# Patient Record
Sex: FEMALE | Race: WHITE | Employment: FULL TIME | URBAN - METROPOLITAN AREA
[De-identification: names, ages, dates, MRNs, and addresses within clinical notes are randomized per-mention and may not be internally consistent; named-entity substitution may affect disease eponyms.]

---

## 2017-01-06 RX ORDER — BUTALBITAL, ACETAMINOPHEN AND CAFFEINE 50; 325; 40 MG/1; MG/1; MG/1
TABLET ORAL
Qty: 28 TABLET | Refills: 1 | Status: CANCELLED | OUTPATIENT
Start: 2017-01-06

## 2017-01-06 RX ORDER — SUMATRIPTAN 50 MG/1
50 TABLET, FILM COATED ORAL EVERY 2 HOUR PRN
Qty: 9 TABLET | Refills: 1 | Status: SHIPPED | OUTPATIENT
Start: 2017-01-06 | End: 2018-01-31

## 2017-01-06 RX ORDER — VALACYCLOVIR HYDROCHLORIDE 1 G/1
TABLET, FILM COATED ORAL EVERY 12 HOURS SCHEDULED
Qty: 21 TABLET | Refills: 0 | Status: CANCELLED | OUTPATIENT
Start: 2017-01-06 | End: 2017-01-13

## 2017-03-02 ENCOUNTER — OFFICE VISIT (OUTPATIENT)
Dept: OCCUPATIONAL MEDICINE | Age: 47
End: 2017-03-02
Attending: PHYSICIAN ASSISTANT

## 2017-03-02 DIAGNOSIS — S61.012A LACERATION OF LEFT THUMB WITHOUT COMPLICATION, INITIAL ENCOUNTER: Primary | ICD-10-CM

## 2017-03-04 ENCOUNTER — OFFICE VISIT (OUTPATIENT)
Dept: OCCUPATIONAL MEDICINE | Age: 47
End: 2017-03-04
Attending: PHYSICIAN ASSISTANT

## 2017-03-04 DIAGNOSIS — S61.412D: Primary | ICD-10-CM

## 2017-03-10 ENCOUNTER — OFFICE VISIT (OUTPATIENT)
Dept: OCCUPATIONAL MEDICINE | Age: 47
End: 2017-03-10
Attending: PHYSICIAN ASSISTANT

## 2017-03-10 DIAGNOSIS — S61.412D: Primary | ICD-10-CM

## 2017-09-14 ENCOUNTER — OFFICE VISIT (OUTPATIENT)
Dept: OBGYN CLINIC | Facility: CLINIC | Age: 47
End: 2017-09-14

## 2017-09-14 VITALS
HEART RATE: 102 BPM | DIASTOLIC BLOOD PRESSURE: 86 MMHG | WEIGHT: 180 LBS | SYSTOLIC BLOOD PRESSURE: 124 MMHG | HEIGHT: 67 IN | BODY MASS INDEX: 28.25 KG/M2

## 2017-09-14 DIAGNOSIS — N95.1 PERIMENOPAUSE: Primary | ICD-10-CM

## 2017-09-14 DIAGNOSIS — R23.2 HOT FLASHES: ICD-10-CM

## 2017-09-14 DIAGNOSIS — N92.6 IRREGULAR MENSES: ICD-10-CM

## 2017-09-14 PROCEDURE — 99203 OFFICE O/P NEW LOW 30 MIN: CPT | Performed by: OBSTETRICS & GYNECOLOGY

## 2017-09-14 NOTE — PROGRESS NOTES
GYN H&P     2017  6:55 PM    CC: Patient presents with: Other: pt states she had went 7 months without a period and last week did have blood when she wiped. Pt states she has night sweats and hot flashes.       HPI: patient is a 50year old  TABLETS EVERY 4 HOURS AS NEEDED Disp: 28 Rfl: 1     No current facility-administered medications on file prior to visit.    Family History   Problem Relation Age of Onset   • Heart Attack Father      Heart Attack   • Breast Cancer Maternal Grandmother 67 cyst measures 3.8 x 3.0 x 3.3 cm. CUL-DE-SAC:  Trace amount of anechoic free fluid. Labs:      PLAN:    1. Perimenopause  -irregular menses likely due to perimenopause    2. Hot flashes  -recommend OTC products like estroven  - FSH;  Future

## 2017-09-18 ENCOUNTER — LAB ENCOUNTER (OUTPATIENT)
Dept: LAB | Age: 47
End: 2017-09-18
Attending: OBSTETRICS & GYNECOLOGY
Payer: COMMERCIAL

## 2017-09-18 DIAGNOSIS — R23.2 HOT FLASHES: ICD-10-CM

## 2017-09-18 LAB
ESTRADIOL: 70.7 PG/ML
FSH: 56 MIU/ML

## 2017-09-18 PROCEDURE — 83001 ASSAY OF GONADOTROPIN (FSH): CPT | Performed by: OBSTETRICS & GYNECOLOGY

## 2017-09-18 PROCEDURE — 82670 ASSAY OF TOTAL ESTRADIOL: CPT | Performed by: OBSTETRICS & GYNECOLOGY

## 2017-09-18 PROCEDURE — 36415 COLL VENOUS BLD VENIPUNCTURE: CPT | Performed by: OBSTETRICS & GYNECOLOGY

## 2017-11-11 ENCOUNTER — OFFICE VISIT (OUTPATIENT)
Dept: FAMILY MEDICINE CLINIC | Facility: CLINIC | Age: 47
End: 2017-11-11

## 2017-11-11 VITALS
BODY MASS INDEX: 29 KG/M2 | SYSTOLIC BLOOD PRESSURE: 120 MMHG | OXYGEN SATURATION: 97 % | HEART RATE: 75 BPM | DIASTOLIC BLOOD PRESSURE: 60 MMHG | RESPIRATION RATE: 18 BRPM | TEMPERATURE: 98 F | WEIGHT: 182 LBS

## 2017-11-11 DIAGNOSIS — J01.00 ACUTE MAXILLARY SINUSITIS, RECURRENCE NOT SPECIFIED: Primary | ICD-10-CM

## 2017-11-11 PROCEDURE — 99213 OFFICE O/P EST LOW 20 MIN: CPT | Performed by: FAMILY MEDICINE

## 2017-11-11 RX ORDER — AMOXICILLIN AND CLAVULANATE POTASSIUM 875; 125 MG/1; MG/1
1 TABLET, FILM COATED ORAL 2 TIMES DAILY
Qty: 20 TABLET | Refills: 0 | Status: SHIPPED | OUTPATIENT
Start: 2017-11-11 | End: 2017-11-21

## 2017-11-11 RX ORDER — CODEINE PHOSPHATE AND GUAIFENESIN 10; 100 MG/5ML; MG/5ML
5 SOLUTION ORAL 4 TIMES DAILY PRN
Qty: 120 ML | Refills: 0 | Status: SHIPPED | OUTPATIENT
Start: 2017-11-11 | End: 2018-03-26

## 2017-11-11 NOTE — PROGRESS NOTES
CHIEF COMPLAINT:   Patient presents with:  Vomiting: pt c\o of sinus, congestion, 1wk       HPI:   Rolande Dancer is a 50year old female who presents for upper respiratory symptoms for  1 weeks.  Patient reports sore throat, congestion, yellow colored debra Resp 18   Wt 182 lb   LMP 10/21/2017   SpO2 97%   BMI 28.51 kg/m²   GENERAL: well developed, well nourished,in no apparent distress  SKIN: no rashes,no suspicious lesions  HEAD: atraumatic, normocephalic.  + tenderness on palpation of maxillary or frontal

## 2017-11-13 ENCOUNTER — TELEPHONE (OUTPATIENT)
Dept: OBGYN CLINIC | Facility: CLINIC | Age: 47
End: 2017-11-13

## 2017-12-11 ENCOUNTER — OFFICE VISIT (OUTPATIENT)
Dept: FAMILY MEDICINE CLINIC | Facility: CLINIC | Age: 47
End: 2017-12-11

## 2017-12-11 VITALS
HEART RATE: 80 BPM | DIASTOLIC BLOOD PRESSURE: 70 MMHG | HEIGHT: 67 IN | WEIGHT: 182.19 LBS | TEMPERATURE: 98 F | RESPIRATION RATE: 18 BRPM | SYSTOLIC BLOOD PRESSURE: 116 MMHG | OXYGEN SATURATION: 98 % | BODY MASS INDEX: 28.6 KG/M2

## 2017-12-11 DIAGNOSIS — J01.00 ACUTE MAXILLARY SINUSITIS, RECURRENCE NOT SPECIFIED: ICD-10-CM

## 2017-12-11 DIAGNOSIS — Z02.9 ENCOUNTERS FOR ADMINISTRATIVE PURPOSE: Primary | ICD-10-CM

## 2017-12-11 PROCEDURE — 99211 OFF/OP EST MAY X REQ PHY/QHP: CPT | Performed by: NURSE PRACTITIONER

## 2017-12-11 NOTE — PROGRESS NOTES
CHIEF COMPLAINT:   Patient presents with: Other: Clearance for work-pt just needs form      HPI:   Chad Aguilar is a 50year old female who presents for f/u on sinusitis.   Pt had sinus infection 1 month ago, is 100% improved, missed 3 days of work, Yaneth Merrill Ht 67\"   Wt 182 lb 3.2 oz   LMP 12/08/2017 (Exact Date)   SpO2 98%   BMI 28.54 kg/m²   GENERAL: well developed, well nourished,in no apparent distress  HEAD: atraumatic, normocephalic,  EYES: conjunctiva clear,   NOSE: nostrils patent,   LUNGS: clear to

## 2018-01-07 ENCOUNTER — OFFICE VISIT (OUTPATIENT)
Dept: FAMILY MEDICINE CLINIC | Facility: CLINIC | Age: 48
End: 2018-01-07

## 2018-01-07 VITALS
TEMPERATURE: 98 F | SYSTOLIC BLOOD PRESSURE: 104 MMHG | HEART RATE: 89 BPM | OXYGEN SATURATION: 98 % | DIASTOLIC BLOOD PRESSURE: 68 MMHG | WEIGHT: 170 LBS | BODY MASS INDEX: 27 KG/M2 | RESPIRATION RATE: 15 BRPM

## 2018-01-07 DIAGNOSIS — J00 NASOPHARYNGITIS: Primary | ICD-10-CM

## 2018-01-07 PROCEDURE — 99213 OFFICE O/P EST LOW 20 MIN: CPT | Performed by: NURSE PRACTITIONER

## 2018-01-07 RX ORDER — FLUTICASONE PROPIONATE 50 MCG
SPRAY, SUSPENSION (ML) NASAL
Qty: 1 BOTTLE | Refills: 0 | Status: SHIPPED | OUTPATIENT
Start: 2018-01-07 | End: 2018-11-02

## 2018-01-07 NOTE — PROGRESS NOTES
CHIEF COMPLAINT:   Patient presents with:  Nasal Congestion      HPI:   Pancho Waller is a 50year old female who presents for upper respiratory symptoms for  2 days.  Patient reports sore throat, congestion, rhinorrhea (clear), PND, slight dry cough, and GI: denies N/V/C or abdominal pain  NEURO: Denies headaches    EXAM:   /68   Pulse 89   Temp 97.8 °F (36.6 °C) (Oral)   Resp 15   Wt 170 lb   LMP 12/08/2017 (Exact Date)   SpO2 98%   BMI 26.63 kg/m²   GENERAL: well developed, well nourished, and in n You have a viral upper respiratory illness (URI), which is another term for the common cold. This illness is contagious during the first few days. It is spread through the air by coughing and sneezing.  It may also be spread by direct contact (touching the · Cough with lots of colored sputum (mucus)  · Severe headache; face, neck, or ear pain  · Difficulty swallowing due to throat pain  · Fever of 100.4°F (38°C) or higher, or as directed by your healthcare provider  Call 911  Call 911 if any of these occur:

## 2018-01-09 RX ORDER — AMOXICILLIN AND CLAVULANATE POTASSIUM 875; 125 MG/1; MG/1
1 TABLET, FILM COATED ORAL 2 TIMES DAILY
Qty: 20 TABLET | Refills: 0 | Status: SHIPPED | OUTPATIENT
Start: 2018-01-09 | End: 2018-01-19

## 2018-01-09 NOTE — PROGRESS NOTES
Patient returned after 2 days per Provider recommendation stating Flonase is not working and requesting an abx. RX sent.

## 2018-01-31 RX ORDER — SUMATRIPTAN 50 MG/1
50 TABLET, FILM COATED ORAL EVERY 2 HOUR PRN
Qty: 9 TABLET | Refills: 3 | Status: SHIPPED | OUTPATIENT
Start: 2018-01-31 | End: 2018-03-26

## 2018-01-31 NOTE — TELEPHONE ENCOUNTER
Pt called for refill of Sumatriptan. If ok, please send to [facility] on file, or if any questions, call on Mobile.  Pt was already informed that LOV was 2015 and she may need to be seen first.

## 2018-03-26 ENCOUNTER — OFFICE VISIT (OUTPATIENT)
Dept: FAMILY MEDICINE CLINIC | Facility: CLINIC | Age: 48
End: 2018-03-26

## 2018-03-26 VITALS
HEART RATE: 78 BPM | TEMPERATURE: 98 F | DIASTOLIC BLOOD PRESSURE: 72 MMHG | WEIGHT: 187 LBS | BODY MASS INDEX: 29.35 KG/M2 | SYSTOLIC BLOOD PRESSURE: 118 MMHG | OXYGEN SATURATION: 98 % | HEIGHT: 67 IN

## 2018-03-26 DIAGNOSIS — J00 NASOPHARYNGITIS: Primary | ICD-10-CM

## 2018-03-26 PROCEDURE — 99213 OFFICE O/P EST LOW 20 MIN: CPT | Performed by: NURSE PRACTITIONER

## 2018-03-26 NOTE — PROGRESS NOTES
CHIEF COMPLAINT:   Patient presents with:  Cough: sore throat, cough, headaches, congestion x3 days      HPI:   Natalie Kunz is a 46year old female who presents for upper respiratory symptoms for  3 days.  Patient reports sore throat only at the beginni LUNGS: clear to auscultation bilaterally, no wheezes or rhonchi. Breathing is non labored. CARDIO: RRR without murmur  EXTREMITIES: no cyanosis, clubbing or edema  LYMPH:  no lymphadenopathy.         ASSESSMENT AND PLAN:   Maria T Schmidt is a 46year old Colds usually last 5 to 10 days. Treatment focuses on relieving symptoms. Treatments may include:  · Decongestant medicines. Several types of decongestants are available without prescription. These may help reduce stuffy or runny nose symptoms.   · Prescrip If you have asthma or chronic bronchitis, a cold can make your condition worse.     When should I call my healthcare provider?   Call your healthcare provider right away if you have any of these:  · Fever of 100.4°F (38°C) or higher, or as directed  · Cough

## 2018-04-30 ENCOUNTER — OFFICE VISIT (OUTPATIENT)
Dept: FAMILY MEDICINE CLINIC | Facility: CLINIC | Age: 48
End: 2018-04-30

## 2018-04-30 VITALS
OXYGEN SATURATION: 97 % | HEIGHT: 67 IN | DIASTOLIC BLOOD PRESSURE: 70 MMHG | TEMPERATURE: 98 F | SYSTOLIC BLOOD PRESSURE: 116 MMHG | WEIGHT: 182 LBS | RESPIRATION RATE: 16 BRPM | HEART RATE: 72 BPM | BODY MASS INDEX: 28.56 KG/M2

## 2018-04-30 DIAGNOSIS — Z12.39 SCREENING FOR BREAST CANCER: ICD-10-CM

## 2018-04-30 DIAGNOSIS — Z13.29 SCREENING FOR ENDOCRINE, NUTRITIONAL, METABOLIC AND IMMUNITY DISORDER: ICD-10-CM

## 2018-04-30 DIAGNOSIS — Z12.11 SCREENING FOR COLON CANCER: ICD-10-CM

## 2018-04-30 DIAGNOSIS — Z13.21 SCREENING FOR ENDOCRINE, NUTRITIONAL, METABOLIC AND IMMUNITY DISORDER: ICD-10-CM

## 2018-04-30 DIAGNOSIS — R53.83 FATIGUE, UNSPECIFIED TYPE: Primary | ICD-10-CM

## 2018-04-30 DIAGNOSIS — R63.5 WEIGHT GAIN: ICD-10-CM

## 2018-04-30 DIAGNOSIS — Z13.0 SCREENING FOR ENDOCRINE, NUTRITIONAL, METABOLIC AND IMMUNITY DISORDER: ICD-10-CM

## 2018-04-30 DIAGNOSIS — Z13.228 SCREENING FOR ENDOCRINE, NUTRITIONAL, METABOLIC AND IMMUNITY DISORDER: ICD-10-CM

## 2018-04-30 PROCEDURE — 99213 OFFICE O/P EST LOW 20 MIN: CPT | Performed by: PHYSICIAN ASSISTANT

## 2018-04-30 NOTE — PROGRESS NOTES
CHIEF COMPLAINT:     Patient presents with:  Thyroid Problem: needs colonoscopy      HPI:   Mayco Ventura is a 46year old female who presents to have thyroid checked. She admits to recent weight gain/hair loss/fatigue/lack of energy.  No personal hx of t exudates. NECK: supple, non-tender. No thyroid abnormalities. No LAD    LUNGS: clear to auscultation bilaterally, no wheezes or rhonchi. Breathing is non labored.   CARDIO: RRR without murmur    ASSESSMENT AND PLAN:       Fatigue, unspecified type  -     T

## 2018-05-01 ENCOUNTER — LAB ENCOUNTER (OUTPATIENT)
Dept: LAB | Age: 48
End: 2018-05-01
Attending: PHYSICIAN ASSISTANT
Payer: COMMERCIAL

## 2018-05-01 DIAGNOSIS — Z13.0 SCREENING FOR ENDOCRINE, NUTRITIONAL, METABOLIC AND IMMUNITY DISORDER: ICD-10-CM

## 2018-05-01 DIAGNOSIS — Z13.228 SCREENING FOR ENDOCRINE, NUTRITIONAL, METABOLIC AND IMMUNITY DISORDER: ICD-10-CM

## 2018-05-01 DIAGNOSIS — R63.5 WEIGHT GAIN: ICD-10-CM

## 2018-05-01 DIAGNOSIS — Z13.21 SCREENING FOR ENDOCRINE, NUTRITIONAL, METABOLIC AND IMMUNITY DISORDER: ICD-10-CM

## 2018-05-01 DIAGNOSIS — Z13.29 SCREENING FOR ENDOCRINE, NUTRITIONAL, METABOLIC AND IMMUNITY DISORDER: ICD-10-CM

## 2018-05-01 DIAGNOSIS — R53.83 FATIGUE, UNSPECIFIED TYPE: ICD-10-CM

## 2018-05-01 PROCEDURE — 86376 MICROSOMAL ANTIBODY EACH: CPT

## 2018-05-01 PROCEDURE — 84439 ASSAY OF FREE THYROXINE: CPT

## 2018-05-01 PROCEDURE — 80061 LIPID PANEL: CPT

## 2018-05-01 PROCEDURE — 85025 COMPLETE CBC W/AUTO DIFF WBC: CPT

## 2018-05-01 PROCEDURE — 82607 VITAMIN B-12: CPT

## 2018-05-01 PROCEDURE — 84443 ASSAY THYROID STIM HORMONE: CPT

## 2018-05-01 PROCEDURE — 36415 COLL VENOUS BLD VENIPUNCTURE: CPT

## 2018-05-01 PROCEDURE — 80053 COMPREHEN METABOLIC PANEL: CPT

## 2018-05-01 PROCEDURE — 82306 VITAMIN D 25 HYDROXY: CPT

## 2018-05-03 ENCOUNTER — TELEPHONE (OUTPATIENT)
Dept: FAMILY MEDICINE CLINIC | Facility: CLINIC | Age: 48
End: 2018-05-03

## 2018-05-03 DIAGNOSIS — E55.9 VITAMIN D DEFICIENCY: Primary | ICD-10-CM

## 2018-05-03 RX ORDER — ERGOCALCIFEROL 1.25 MG/1
50000 CAPSULE ORAL WEEKLY
Qty: 12 CAPSULE | Refills: 0 | Status: SHIPPED | OUTPATIENT
Start: 2018-05-03 | End: 2018-08-01

## 2018-05-03 NOTE — TELEPHONE ENCOUNTER
I called and spoke to patient. Advised of results and plan of care below. Provided with information to Dr. Jb Bailey and Dr. Donita Blount. Rx for vitamin D sent to pharmacy on file per patient request. Orders placed for repeat lab in 6 months.  Advised to call if any

## 2018-05-03 NOTE — TELEPHONE ENCOUNTER
----- Message from Katy Nunn PA-C sent at 5/3/2018 10:33 AM CDT -----  Thyroid antibodies are elevated but TSH/T4 normal-likely hashimotos -please refer to [facility].    Low vitamin d Please have patient take Rx of 50,000 units of vitam

## 2018-06-06 ENCOUNTER — OFFICE VISIT (OUTPATIENT)
Dept: SURGERY | Facility: CLINIC | Age: 48
End: 2018-06-06

## 2018-06-06 VITALS
SYSTOLIC BLOOD PRESSURE: 122 MMHG | HEIGHT: 67 IN | DIASTOLIC BLOOD PRESSURE: 82 MMHG | TEMPERATURE: 98 F | BODY MASS INDEX: 28.25 KG/M2 | WEIGHT: 180 LBS

## 2018-06-06 DIAGNOSIS — Z12.11 ENCOUNTER FOR SCREENING COLONOSCOPY: Primary | ICD-10-CM

## 2018-06-06 PROCEDURE — S0285 CNSLT BEFORE SCREEN COLONOSC: HCPCS | Performed by: SURGERY

## 2018-06-06 RX ORDER — SUMATRIPTAN 25 MG/1
25 TABLET, FILM COATED ORAL EVERY 2 HOUR PRN
COMMUNITY
End: 2018-08-13

## 2018-06-06 RX ORDER — POLYETHYLENE GLYCOL 3350, SODIUM CHLORIDE, SODIUM BICARBONATE, POTASSIUM CHLORIDE 420; 11.2; 5.72; 1.48 G/4L; G/4L; G/4L; G/4L
POWDER, FOR SOLUTION ORAL
Qty: 1 BOTTLE | Refills: 0 | Status: SHIPPED | OUTPATIENT
Start: 2018-06-06 | End: 2018-09-07

## 2018-06-06 NOTE — H&P
New Patient Visit Note       Active Problems      1. Encounter for screening colonoscopy        Chief Complaint   Patient presents with:  Colonoscopy Screening: New patient referred by Dr. Chasidy Reese for colonoscopy consult. first colonoscopy.  denies rectal ble Number of children:               Social History Main Topics    Smoking status: Former Smoker                                                                Packs/day: 1.00      Years: 22.00          Quit date: 5/15/2013    Smokeless tobacco: Psychiatric/Behavioral: Negative for behavioral problems and sleep disturbance.        Physical Findings   /82 (BP Location: Right arm, Patient Position: Sitting, Cuff Size: adult)   Temp 98.1 °F (36.7 °C) (Oral)   Ht 67\"   Wt 180 lb   BMI 28.19 kg MD Dorothy

## 2018-08-05 NOTE — PROGRESS NOTES
Addendum, added pt's diagnosis, J01.00, for which she needed paperwork completed due to missing work in 11/2017

## 2018-08-13 ENCOUNTER — OFFICE VISIT (OUTPATIENT)
Dept: FAMILY MEDICINE CLINIC | Facility: CLINIC | Age: 48
End: 2018-08-13
Payer: COMMERCIAL

## 2018-08-13 VITALS
TEMPERATURE: 98 F | DIASTOLIC BLOOD PRESSURE: 74 MMHG | BODY MASS INDEX: 29.35 KG/M2 | RESPIRATION RATE: 16 BRPM | WEIGHT: 187 LBS | HEART RATE: 69 BPM | OXYGEN SATURATION: 98 % | SYSTOLIC BLOOD PRESSURE: 116 MMHG | HEIGHT: 67 IN

## 2018-08-13 DIAGNOSIS — G43.711 INTRACTABLE CHRONIC MIGRAINE WITHOUT AURA AND WITH STATUS MIGRAINOSUS: Primary | ICD-10-CM

## 2018-08-13 DIAGNOSIS — M79.671 PAIN OF RIGHT HEEL: ICD-10-CM

## 2018-08-13 PROCEDURE — 99214 OFFICE O/P EST MOD 30 MIN: CPT | Performed by: NURSE PRACTITIONER

## 2018-08-13 RX ORDER — SUMATRIPTAN 100 MG/1
100 TABLET, FILM COATED ORAL EVERY 2 HOUR PRN
Qty: 9 TABLET | Refills: 0 | Status: SHIPPED | OUTPATIENT
Start: 2018-08-13 | End: 2018-10-10

## 2018-08-13 RX ORDER — CODEINE/BUTALBITAL/ASA/CAFFEIN 30-50-325
1 CAPSULE ORAL EVERY 6 HOURS PRN
Qty: 90 CAPSULE | Refills: 0 | Status: SHIPPED | OUTPATIENT
Start: 2018-08-13 | End: 2018-09-12

## 2018-08-13 RX ORDER — SUMATRIPTAN 100 MG/1
100 TABLET, FILM COATED ORAL EVERY 2 HOUR PRN
Qty: 9 TABLET | Refills: 0 | Status: SHIPPED | OUTPATIENT
Start: 2018-08-13 | End: 2018-08-13

## 2018-08-13 RX ORDER — PROPRANOLOL HYDROCHLORIDE 20 MG/1
20 TABLET ORAL 2 TIMES DAILY
Qty: 60 TABLET | Refills: 0 | Status: SHIPPED | OUTPATIENT
Start: 2018-08-13 | End: 2018-09-13

## 2018-08-13 NOTE — PROGRESS NOTES
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   CHEST: Denies chest pain, or palpitations  LUNGS: Denies shortness of breath, cough, or wheezing  GI: Denies abdominal pain, N/V/C/D.   MUSCULOSKELETAL: no arthralgia or swollen joints, + heel pain  LYMPH: mouth 2 (two) times daily.  -     COMP METABOLIC PANEL (14); Future  Continue medications as prescribed   Patient to call or RTC sooner if develops any new symptoms or has any questions , verbalized understanding , all questions were answered.    Pain of ri

## 2018-08-16 ENCOUNTER — TELEPHONE (OUTPATIENT)
Dept: FAMILY MEDICINE CLINIC | Facility: CLINIC | Age: 48
End: 2018-08-16

## 2018-08-16 NOTE — TELEPHONE ENCOUNTER
Called [facility] and spoke with Sanford Curling. She states that Rx was received. She states that the Rx does require a PA for patient. Will kaylyn for follow-up.

## 2018-08-16 NOTE — TELEPHONE ENCOUNTER
Patient amrit calling for status of script for Butalbital-ASA-Caff-Codeine -76-30 MG Oral Cap  [facility] has not received it, please resend

## 2018-08-20 NOTE — TELEPHONE ENCOUNTER
I called [facility] at [phone] and asked for PA info. The number to call is [phone] ID [id number]. I called and Jace Canal and they are going to fax the PA form.

## 2018-08-27 ENCOUNTER — OFFICE VISIT (OUTPATIENT)
Dept: FAMILY MEDICINE CLINIC | Facility: CLINIC | Age: 48
End: 2018-08-27

## 2018-08-27 ENCOUNTER — TELEPHONE (OUTPATIENT)
Dept: FAMILY MEDICINE CLINIC | Facility: CLINIC | Age: 48
End: 2018-08-27

## 2018-08-27 ENCOUNTER — OFFICE VISIT (OUTPATIENT)
Dept: FAMILY MEDICINE CLINIC | Facility: CLINIC | Age: 48
End: 2018-08-27
Payer: COMMERCIAL

## 2018-08-27 VITALS
HEART RATE: 59 BPM | RESPIRATION RATE: 16 BRPM | TEMPERATURE: 98 F | OXYGEN SATURATION: 98 % | BODY MASS INDEX: 29.98 KG/M2 | DIASTOLIC BLOOD PRESSURE: 70 MMHG | HEIGHT: 67 IN | SYSTOLIC BLOOD PRESSURE: 110 MMHG | WEIGHT: 191 LBS

## 2018-08-27 DIAGNOSIS — I87.2 VENOUS INSUFFICIENCY OF BOTH LOWER EXTREMITIES: Primary | ICD-10-CM

## 2018-08-27 DIAGNOSIS — G89.29 CHRONIC HEEL PAIN, RIGHT: ICD-10-CM

## 2018-08-27 DIAGNOSIS — M79.671 CHRONIC HEEL PAIN, RIGHT: ICD-10-CM

## 2018-08-27 DIAGNOSIS — Z02.9 ENCOUNTERS FOR ADMINISTRATIVE PURPOSE: Primary | ICD-10-CM

## 2018-08-27 DIAGNOSIS — E06.3 AUTOIMMUNE THYROIDITIS: ICD-10-CM

## 2018-08-27 PROCEDURE — 99214 OFFICE O/P EST MOD 30 MIN: CPT | Performed by: FAMILY MEDICINE

## 2018-08-27 RX ORDER — AMITRIPTYLINE HYDROCHLORIDE 25 MG/1
25 TABLET, FILM COATED ORAL NIGHTLY
Qty: 90 TABLET | Refills: 0 | Status: SHIPPED | OUTPATIENT
Start: 2018-08-27 | End: 2019-02-19

## 2018-08-27 RX ORDER — AMITRIPTYLINE HYDROCHLORIDE 25 MG/1
25 TABLET, FILM COATED ORAL NIGHTLY
Qty: 90 TABLET | Refills: 0 | Status: SHIPPED | OUTPATIENT
Start: 2018-08-27 | End: 2018-08-27

## 2018-08-27 RX ORDER — HYDROCHLOROTHIAZIDE 12.5 MG/1
12.5 TABLET ORAL DAILY PRN
Qty: 30 TABLET | Refills: 0 | Status: SHIPPED | OUTPATIENT
Start: 2018-08-27 | End: 2018-09-13

## 2018-08-27 RX ORDER — HYDROCHLOROTHIAZIDE 12.5 MG/1
12.5 TABLET ORAL DAILY PRN
Qty: 30 TABLET | Refills: 0 | Status: SHIPPED | OUTPATIENT
Start: 2018-08-27 | End: 2018-08-27

## 2018-08-27 NOTE — PROGRESS NOTES
[facility] Family Medicine Office Note  Chief Complaint:   Patient presents with:  Leg Swelling: started today, right leg, numbness      HPI:   This is a 46year old female coming in for  HPI  Leg swelling  States she has swelling - started today Lactobacillus (PROBIOTIC ACIDOPHILUS OR) Take by mouth.  Disp:  Rfl:    PEG 3350-KCl-Na Bicarb-NaCl (TRILYTE) 420 g Oral Recon Soln Starting at 4:00 pm the night before procedure, drink 8 ounces of the prep every 15-20 minutes until finished Disp: 1 Bottl normal. She has no wheezes. She has no rales. Musculoskeletal:        Right lower leg: She exhibits deformity (multiple varicosities). She exhibits no tenderness. Left lower leg: She exhibits deformity (multiple varicosities).  She exhibits no tend insufficiency

## 2018-08-27 NOTE — PROGRESS NOTES
Patient is Jewel , asked me to look at her leg. Brought patient to Exam Rm 1 and examined right leg. Noticeable swelling to R thigh compared to left that started in the past couple hours. No redness or warmth to area.  Patient has numerous jose

## 2018-08-27 NOTE — TELEPHONE ENCOUNTER
Patient asked for the two prescriptions written today to be resubmittted to the pharmacy as the pharmacy did not receive them.

## 2018-08-27 NOTE — TELEPHONE ENCOUNTER
Both meds had prescription errors: \"E-Prescribing Status: Transmission to pharmacy failed (8/27/2018  2:10 PM CDT)\"    Both resent and now go thru.   \"E-Prescribing Status: Receipt confirmed by pharmacy (8/27/2018  4:10 PM CDT)\"

## 2018-08-28 ENCOUNTER — TELEPHONE (OUTPATIENT)
Dept: FAMILY MEDICINE CLINIC | Facility: CLINIC | Age: 48
End: 2018-08-28

## 2018-08-28 NOTE — TELEPHONE ENCOUNTER
Pharmacy ran the RX thru as butalb-caff-acetaminophen-codiene (Fiorocet with codeine) instead of what we prescribed butalb-ASA-Caff-codeine (Fiorinal with codeine) so when I did the PA, they approved the Fiorocet with codiene.   I called back and spoke to PORFIRIO

## 2018-08-28 NOTE — TELEPHONE ENCOUNTER
[facility] lvm stating that PA went thru for wrong medication Butalbital-ASA-Caff-Codeine -49-30 MG Oral Cap  PA-- should have been for other medication, unable to understand name of medication on recording

## 2018-08-28 NOTE — TELEPHONE ENCOUNTER
Med approved 8/28/18-11/26/18.   I called [facility] and they states they are aware and RX  ready for pt

## 2018-08-28 NOTE — TELEPHONE ENCOUNTER
Approved on 8/25/18-11/23/18. Pharmacy ran it on 8/25/18 for $20.64 for 90 capsules for 22 days. [facility] was informed.

## 2018-08-28 NOTE — TELEPHONE ENCOUNTER
Pharmacy initially ran the RX thru as butalb-caff-acetaminophen-codiene (Fiorocet with codeine) instead of what we prescribed butalb-ASA-Caff-codeine (Fiorinal with codeine) so when I did the PA, they approved the Fiorocet with codiene.   I called back and

## 2018-08-30 ENCOUNTER — APPOINTMENT (OUTPATIENT)
Dept: LAB | Age: 48
End: 2018-08-30
Attending: NURSE PRACTITIONER
Payer: COMMERCIAL

## 2018-08-30 ENCOUNTER — HOSPITAL ENCOUNTER (OUTPATIENT)
Dept: MRI IMAGING | Age: 48
Discharge: HOME | End: 2018-08-30
Attending: NURSE PRACTITIONER
Payer: COMMERCIAL

## 2018-08-30 ENCOUNTER — HOSPITAL ENCOUNTER (OUTPATIENT)
Dept: GENERAL RADIOLOGY | Age: 48
Discharge: HOME | End: 2018-08-30
Attending: NURSE PRACTITIONER
Payer: COMMERCIAL

## 2018-08-30 DIAGNOSIS — G43.711 INTRACTABLE CHRONIC MIGRAINE WITHOUT AURA AND WITH STATUS MIGRAINOSUS: ICD-10-CM

## 2018-08-30 DIAGNOSIS — I87.2 VENOUS INSUFFICIENCY OF BOTH LOWER EXTREMITIES: ICD-10-CM

## 2018-08-30 DIAGNOSIS — M79.671 PAIN OF RIGHT HEEL: ICD-10-CM

## 2018-08-30 DIAGNOSIS — E06.3 AUTOIMMUNE THYROIDITIS: ICD-10-CM

## 2018-08-30 LAB
ANION GAP SERPL CALC-SCNC: 8 MMOL/L (ref 0–18)
BUN BLD-MCNC: 20 MG/DL (ref 8–20)
BUN/CREAT SERPL: 25 (ref 10–20)
CALCIUM BLD-MCNC: 9.3 MG/DL (ref 8.3–10.3)
CHLORIDE SERPL-SCNC: 100 MMOL/L (ref 101–111)
CO2 SERPL-SCNC: 28 MMOL/L (ref 22–32)
CREAT BLD-MCNC: 0.8 MG/DL (ref 0.55–1.02)
GLUCOSE BLD-MCNC: 102 MG/DL (ref 70–99)
OSMOLALITY SERPL CALC.SUM OF ELEC: 285 MOSM/KG (ref 275–295)
POTASSIUM SERPL-SCNC: 4.6 MMOL/L (ref 3.6–5.1)
SODIUM SERPL-SCNC: 136 MMOL/L (ref 136–144)
T4 FREE SERPL-MCNC: 0.9 NG/DL (ref 0.9–1.8)
TSI SER-ACNC: 1.74 MIU/ML (ref 0.35–5.5)

## 2018-08-30 PROCEDURE — 84443 ASSAY THYROID STIM HORMONE: CPT

## 2018-08-30 PROCEDURE — 73650 X-RAY EXAM OF HEEL: CPT | Performed by: NURSE PRACTITIONER

## 2018-08-30 PROCEDURE — A9576 INJ PROHANCE MULTIPACK: HCPCS | Performed by: NURSE PRACTITIONER

## 2018-08-30 PROCEDURE — 70553 MRI BRAIN STEM W/O & W/DYE: CPT | Performed by: NURSE PRACTITIONER

## 2018-08-30 PROCEDURE — 36415 COLL VENOUS BLD VENIPUNCTURE: CPT

## 2018-08-30 PROCEDURE — 84439 ASSAY OF FREE THYROXINE: CPT

## 2018-08-30 PROCEDURE — 80048 BASIC METABOLIC PNL TOTAL CA: CPT

## 2018-08-31 ENCOUNTER — TELEPHONE (OUTPATIENT)
Dept: FAMILY MEDICINE CLINIC | Facility: CLINIC | Age: 48
End: 2018-08-31

## 2018-08-31 DIAGNOSIS — M77.31 HEEL SPUR, RIGHT: Primary | ICD-10-CM

## 2018-08-31 NOTE — TELEPHONE ENCOUNTER
MRI BRAIN (W+WO) (EJZ=[id number])   Order: [id number]   Status:  Final result   Visible to patient:  Yes ([address]) Dx: Intractable chronic migraine without . ..    Notes recorded by VICKI Ledezma on 8/31/2018 at 8:14 AM CDT  No acute findings MRI brain ,

## 2018-08-31 NOTE — TELEPHONE ENCOUNTER
LVM for pt regarding results and instructions listed below. Pt instructed to call back with any further questions/concerns.      [employer] msg sent to pt

## 2018-09-13 ENCOUNTER — TELEPHONE (OUTPATIENT)
Dept: FAMILY MEDICINE CLINIC | Facility: CLINIC | Age: 48
End: 2018-09-13

## 2018-09-13 DIAGNOSIS — G43.711 INTRACTABLE CHRONIC MIGRAINE WITHOUT AURA AND WITH STATUS MIGRAINOSUS: ICD-10-CM

## 2018-09-13 RX ORDER — HYDROCHLOROTHIAZIDE 12.5 MG/1
12.5 TABLET ORAL DAILY PRN
Qty: 30 TABLET | Refills: 5 | Status: SHIPPED | OUTPATIENT
Start: 2018-09-13 | End: 2019-03-31

## 2018-09-13 RX ORDER — TOPIRAMATE 25 MG/1
25 TABLET ORAL 2 TIMES DAILY
Qty: 60 TABLET | Refills: 0 | Status: SHIPPED | OUTPATIENT
Start: 2018-09-13 | End: 2018-10-10

## 2018-09-13 RX ORDER — PROPRANOLOL HYDROCHLORIDE 20 MG/1
20 TABLET ORAL 2 TIMES DAILY
Qty: 60 TABLET | Refills: 5 | Status: SHIPPED | OUTPATIENT
Start: 2018-09-13 | End: 2018-10-13

## 2018-09-13 NOTE — TELEPHONE ENCOUNTER
Patient received her test results from her Xray (foot) and is looking for a referral to a specialist.  Also, the MRI of the brain is normal and her blood work is normal but she still has migraine headaches, She will run out of her medication in a week and

## 2018-09-13 NOTE — TELEPHONE ENCOUNTER
Patient wondering how to proceed with migraine headaches. MRI normal. Referral to neuro?   LOV 8/27/18

## 2018-09-13 NOTE — TELEPHONE ENCOUNTER
I spoke at length with patient. She is still concerned because she is having migraines. We discussed her current medications. She says she has used the amitriptyline twice. First time was accidentally taken in the morning and she was \"out of it\" all day.

## 2018-09-13 NOTE — TELEPHONE ENCOUNTER
Vitamin D not due until November  tsh results were released thru [facility] - not sure why she doesn't see them    Is she tolerating amitriptyline - without side effects? If so can inc to 50mg qhs.    Next option would be topiramate 25mg bid     Patient should

## 2018-09-20 ENCOUNTER — PATIENT OUTREACH (OUTPATIENT)
Dept: SURGERY | Facility: CLINIC | Age: 48
End: 2018-09-20

## 2018-10-10 ENCOUNTER — OFFICE VISIT (OUTPATIENT)
Dept: FAMILY MEDICINE CLINIC | Facility: CLINIC | Age: 48
End: 2018-10-10
Payer: COMMERCIAL

## 2018-10-10 VITALS
SYSTOLIC BLOOD PRESSURE: 110 MMHG | OXYGEN SATURATION: 97 % | HEART RATE: 77 BPM | RESPIRATION RATE: 16 BRPM | BODY MASS INDEX: 28.72 KG/M2 | TEMPERATURE: 99 F | HEIGHT: 67 IN | WEIGHT: 183 LBS | DIASTOLIC BLOOD PRESSURE: 60 MMHG

## 2018-10-10 DIAGNOSIS — Z13.21 SCREENING FOR ENDOCRINE, NUTRITIONAL, METABOLIC AND IMMUNITY DISORDER: ICD-10-CM

## 2018-10-10 DIAGNOSIS — H10.31 ACUTE CONJUNCTIVITIS OF RIGHT EYE, UNSPECIFIED ACUTE CONJUNCTIVITIS TYPE: ICD-10-CM

## 2018-10-10 DIAGNOSIS — Z13.29 SCREENING FOR ENDOCRINE, NUTRITIONAL, METABOLIC AND IMMUNITY DISORDER: ICD-10-CM

## 2018-10-10 DIAGNOSIS — G43.711 INTRACTABLE CHRONIC MIGRAINE WITHOUT AURA AND WITH STATUS MIGRAINOSUS: Primary | ICD-10-CM

## 2018-10-10 DIAGNOSIS — Z13.228 SCREENING FOR ENDOCRINE, NUTRITIONAL, METABOLIC AND IMMUNITY DISORDER: ICD-10-CM

## 2018-10-10 DIAGNOSIS — Z13.0 SCREENING FOR ENDOCRINE, NUTRITIONAL, METABOLIC AND IMMUNITY DISORDER: ICD-10-CM

## 2018-10-10 PROCEDURE — 99214 OFFICE O/P EST MOD 30 MIN: CPT | Performed by: FAMILY MEDICINE

## 2018-10-10 RX ORDER — SUMATRIPTAN 100 MG/1
100 TABLET, FILM COATED ORAL EVERY 2 HOUR PRN
Qty: 9 TABLET | Refills: 5 | Status: SHIPPED | OUTPATIENT
Start: 2018-10-10 | End: 2021-11-16

## 2018-10-10 RX ORDER — TOPIRAMATE 50 MG/1
50 TABLET, FILM COATED ORAL 2 TIMES DAILY
Qty: 180 TABLET | Refills: 1 | Status: SHIPPED | OUTPATIENT
Start: 2018-10-10 | End: 2019-02-19

## 2018-10-10 RX ORDER — TOBRAMYCIN 3 MG/ML
2 SOLUTION/ DROPS OPHTHALMIC EVERY 6 HOURS
Qty: 5 ML | Refills: 0 | Status: SHIPPED | OUTPATIENT
Start: 2018-10-10 | End: 2018-11-15 | Stop reason: ALTCHOICE

## 2018-10-10 NOTE — PROGRESS NOTES
[facility] Family Medicine Office Note  Chief Complaint:   Patient presents with:  Migraine: f/u medication  Eyelid Swelling: x2 days, right eye, brown discharge/redness      HPI:   This is a 46year old female coming in for  HPI     Migraine  to Tobramycin Sulfate 0.3 % Ophthalmic Solution Place 2 drops into the right eye every 6 (six) hours. 7 days Disp: 5 mL Rfl: 0   Propranolol HCl 20 MG Oral Tab Take 1 tablet (20 mg total) by mouth 2 (two) times daily.  Disp: 60 tablet Rfl: 5   hydrochlorothi reactive to light. Right eye exhibits discharge. Left eye exhibits no discharge. Right conjunctiva is injected. Left conjunctiva is not injected. Neck: Neck supple. Cardiovascular: Normal rate and regular rhythm.    Pulmonary/Chest: Effort normal and br from the treatments as a result of today.      Problem List:  Patient Active Problem List:     Unilateral or unspecified femoral hernia without mention of obstruction or gangrene, unilateral or unspecified     Unspecified adjustment reaction     Chronic ten

## 2018-11-02 ENCOUNTER — OFFICE VISIT (OUTPATIENT)
Dept: FAMILY MEDICINE CLINIC | Facility: CLINIC | Age: 48
End: 2018-11-02
Payer: COMMERCIAL

## 2018-11-02 VITALS
HEART RATE: 61 BPM | WEIGHT: 182 LBS | HEIGHT: 67 IN | DIASTOLIC BLOOD PRESSURE: 70 MMHG | RESPIRATION RATE: 16 BRPM | OXYGEN SATURATION: 99 % | BODY MASS INDEX: 28.56 KG/M2 | SYSTOLIC BLOOD PRESSURE: 114 MMHG | TEMPERATURE: 98 F

## 2018-11-02 DIAGNOSIS — B96.89 ACUTE BACTERIAL SINUSITIS: Primary | ICD-10-CM

## 2018-11-02 DIAGNOSIS — J01.90 ACUTE BACTERIAL SINUSITIS: Primary | ICD-10-CM

## 2018-11-02 DIAGNOSIS — J40 BRONCHITIS: ICD-10-CM

## 2018-11-02 PROCEDURE — 99213 OFFICE O/P EST LOW 20 MIN: CPT | Performed by: NURSE PRACTITIONER

## 2018-11-02 PROCEDURE — 87880 STREP A ASSAY W/OPTIC: CPT | Performed by: NURSE PRACTITIONER

## 2018-11-02 RX ORDER — BENZONATATE 100 MG/1
CAPSULE ORAL 3 TIMES DAILY PRN
Qty: 30 CAPSULE | Refills: 0 | Status: SHIPPED | OUTPATIENT
Start: 2018-11-02 | End: 2018-11-15 | Stop reason: ALTCHOICE

## 2018-11-02 RX ORDER — ALBUTEROL SULFATE 90 UG/1
1-2 AEROSOL, METERED RESPIRATORY (INHALATION)
Qty: 1 INHALER | Refills: 0 | Status: SHIPPED | OUTPATIENT
Start: 2018-11-02 | End: 2020-01-03

## 2018-11-02 RX ORDER — AMOXICILLIN AND CLAVULANATE POTASSIUM 875; 125 MG/1; MG/1
1 TABLET, FILM COATED ORAL 2 TIMES DAILY
Qty: 20 TABLET | Refills: 0 | Status: SHIPPED | OUTPATIENT
Start: 2018-11-02 | End: 2018-11-12

## 2018-11-02 RX ORDER — FLUTICASONE PROPIONATE 50 MCG
2 SPRAY, SUSPENSION (ML) NASAL DAILY
Qty: 3 BOTTLE | Refills: 0 | Status: SHIPPED | OUTPATIENT
Start: 2018-11-02 | End: 2019-01-31

## 2018-11-02 NOTE — PROGRESS NOTES
Chief Complaint:   Patient presents with: Body ache and/or chills: x2 days, \"felt warm\"  Sore Throat: x3 days, difficulty in swallowing  Headache: x 4days    HPI:   This is a 46year old female presenting with headache, sore throat, and cough.  Started a tablet (50 mg total) by mouth 2 (two) times daily. Disp: 180 tablet Rfl: 1   Tobramycin Sulfate 0.3 % Ophthalmic Solution Place 2 drops into the right eye every 6 (six) hours.  7 days Disp: 5 mL Rfl: 0   hydrochlorothiazide 12.5 MG Oral Tab Take 1 tablet (1 groomed. Physical Exam    Constitutional: She is oriented to person, place, and time. Vital signs are normal. She appears well-developed and well-nourished. HENT:   Head: Normocephalic and atraumatic.    Right Ear: Hearing, tympanic membrane, external ea August 2018 indicates ethmoid and sphenoid sinus thickening. Will consider ENT f/u for sinusitis in the future if symptoms persist.  -Try Flonase and Zyrtec daily.  - Amoxicillin-Pot Clavulanate (AUGMENTIN) 875-125 MG Oral Tab;  Take 1 tablet by mouth 2 (tw

## 2018-11-02 NOTE — PATIENT INSTRUCTIONS
Acute Bacterial Rhinosinusitis (ABRS)    Acute bacterial rhinosinusitis (ABRS) is an infection of your nasal cavity and sinuses. It’s caused by bacteria. Acute means that you’ve had symptoms for less than 4 weeks, but possibly up to 12 weeks.   Understand · Nasal corticosteroid medicine. Drops or spray used in the nose can lessen swelling and congestion. · Over-the-counter pain medicine. This is to lessen sinus pain and pressure. · Nasal decongestant medicine. Spray or drops may help to lessen congestion. This illness is contagious during the first few days and is spread through the air by coughing and sneezing, or by direct contact (touching the sick person and then touching your own eyes, nose, or mouth).   Most viral illnesses resolve within 10 to 14 days If you are age 72 or older, or if you have a chronic lung disease or condition that affects your immune system, or you smoke, ask your healthcare provider about getting a pneumococcal vaccine and a yearly flu shot (influenza vaccine).   When to seek medical

## 2018-11-15 ENCOUNTER — TELEPHONE (OUTPATIENT)
Dept: FAMILY MEDICINE CLINIC | Facility: CLINIC | Age: 48
End: 2018-11-15

## 2018-11-15 ENCOUNTER — OFFICE VISIT (OUTPATIENT)
Dept: FAMILY MEDICINE CLINIC | Facility: CLINIC | Age: 48
End: 2018-11-15
Payer: COMMERCIAL

## 2018-11-15 VITALS
WEIGHT: 181 LBS | TEMPERATURE: 98 F | SYSTOLIC BLOOD PRESSURE: 100 MMHG | HEART RATE: 60 BPM | HEIGHT: 66 IN | DIASTOLIC BLOOD PRESSURE: 74 MMHG | OXYGEN SATURATION: 90 % | RESPIRATION RATE: 16 BRPM | BODY MASS INDEX: 29.09 KG/M2

## 2018-11-15 DIAGNOSIS — Z23 NEED FOR VACCINATION: ICD-10-CM

## 2018-11-15 DIAGNOSIS — K21.9 GASTROESOPHAGEAL REFLUX DISEASE, ESOPHAGITIS PRESENCE NOT SPECIFIED: ICD-10-CM

## 2018-11-15 DIAGNOSIS — G43.711 INTRACTABLE CHRONIC MIGRAINE WITHOUT AURA AND WITH STATUS MIGRAINOSUS: ICD-10-CM

## 2018-11-15 DIAGNOSIS — E55.9 VITAMIN D DEFICIENCY: ICD-10-CM

## 2018-11-15 DIAGNOSIS — E06.3 AUTOIMMUNE THYROIDITIS: ICD-10-CM

## 2018-11-15 DIAGNOSIS — I87.2 VENOUS INSUFFICIENCY: ICD-10-CM

## 2018-11-15 DIAGNOSIS — Z12.39 ENCOUNTER FOR OTHER SCREENING FOR MALIGNANT NEOPLASM OF BREAST: ICD-10-CM

## 2018-11-15 DIAGNOSIS — Z12.4 SCREENING FOR CERVICAL CANCER: ICD-10-CM

## 2018-11-15 DIAGNOSIS — Z00.00 ENCOUNTER FOR ROUTINE HISTORY AND PHYSICAL EXAM IN FEMALE PATIENT: Primary | ICD-10-CM

## 2018-11-15 PROCEDURE — 87624 HPV HI-RISK TYP POOLED RSLT: CPT | Performed by: NURSE PRACTITIONER

## 2018-11-15 PROCEDURE — 99396 PREV VISIT EST AGE 40-64: CPT | Performed by: NURSE PRACTITIONER

## 2018-11-15 RX ORDER — FLUTICASONE PROPIONATE 50 MCG
2 SPRAY, SUSPENSION (ML) NASAL DAILY
Qty: 3 BOTTLE | Refills: 0 | Status: CANCELLED | OUTPATIENT
Start: 2018-11-15 | End: 2019-02-13

## 2018-11-15 RX ORDER — SUMATRIPTAN 100 MG/1
100 TABLET, FILM COATED ORAL EVERY 2 HOUR PRN
Qty: 9 TABLET | Refills: 5 | Status: CANCELLED | OUTPATIENT
Start: 2018-11-15

## 2018-11-15 RX ORDER — ALBUTEROL SULFATE 90 UG/1
1-2 AEROSOL, METERED RESPIRATORY (INHALATION)
Qty: 1 INHALER | Refills: 0 | Status: CANCELLED | OUTPATIENT
Start: 2018-11-15

## 2018-11-15 RX ORDER — HYDROCHLOROTHIAZIDE 12.5 MG/1
12.5 TABLET ORAL DAILY PRN
Qty: 30 TABLET | Refills: 5 | Status: CANCELLED | OUTPATIENT
Start: 2018-11-15 | End: 2019-11-10

## 2018-11-15 RX ORDER — AMITRIPTYLINE HYDROCHLORIDE 25 MG/1
25 TABLET, FILM COATED ORAL NIGHTLY
Qty: 90 TABLET | Refills: 0 | Status: CANCELLED | OUTPATIENT
Start: 2018-11-15

## 2018-11-15 RX ORDER — PANTOPRAZOLE SODIUM 40 MG/1
40 TABLET, DELAYED RELEASE ORAL
Qty: 90 TABLET | Refills: 0 | Status: SHIPPED | OUTPATIENT
Start: 2018-11-15 | End: 2018-12-17 | Stop reason: ALTCHOICE

## 2018-11-15 RX ORDER — TOPIRAMATE 50 MG/1
50 TABLET, FILM COATED ORAL 2 TIMES DAILY
Qty: 180 TABLET | Refills: 1 | Status: CANCELLED | OUTPATIENT
Start: 2018-11-15 | End: 2019-05-14

## 2018-11-15 NOTE — PROGRESS NOTES
Chief Complaint:   Patient presents with: Annual: with pap    HPI:   This is a 46year old female presenting for annual physical exam with pap. PMH + for venous insufficiency, autoimmune thyroiditis, migraine headaches, and vitamin D deficiency.  Using hct infection      Past Surgical History:   Procedure Laterality Date   • COLONOSCOPY,POSSIBLE BIOPSY,POSSIBLE POLYPECTOMY N/A 9/7/2018    Performed by Jourdan Loya MD at [address] [facility].; W/BIOPSY(S), CERVIX drip, rhinorrhea, sore throat and trouble swallowing. Eyes: Negative for pain, redness and visual disturbance. Respiratory: Negative for cough, chest tightness, shortness of breath and wheezing. Cardiovascular: Positive for leg swelling.  Negative f to light. Neck: Normal range of motion and full passive range of motion without pain. Neck supple. No thyroid mass and no thyromegaly present. Cardiovascular: Normal rate, regular rhythm, normal heart sounds, intact distal pulses and normal pulses. Oral Tab EC; Take 1 tablet (40 mg total) by mouth every morning before breakfast.   - GASTRO - INTERNAL-Dr. Vianca Craig    7. Venous insufficiency  -Stable, CPM    8.. Autoimmune thyroiditis  -Stable, CPM    9.  Intractable chronic migraine without aura and

## 2018-11-15 NOTE — TELEPHONE ENCOUNTER
Physical form was faxed and confirmation received. Copy was given to patient at time of visit. Copy also sent to scan.

## 2018-11-15 NOTE — TELEPHONE ENCOUNTER
Pt wanted to confirm physical form was faxed. According to TE from today - it was faxed and confirmed.

## 2018-11-15 NOTE — PATIENT INSTRUCTIONS
GERD (Adult)    The esophagus is a tube that carries food from the mouth to the stomach. A valve (the LES, lower esophageal sphincter) at the lower end of the esophagus prevents stomach acid from flowing upward.  When this valve doesn't work properly, sto · If your symptoms occur during sleep, use a foam wedge to elevate your upper body (not just your head.) Or, place 4\" blocks under the head of your bed. Or use 2 bed risers under your bedframe.   Medicines  If needed, medicines can help relieve the symptom © 8109-7670 [employer] [address]. [address] [facility], [facility] [place]. All rights reserved. This information is not intended as a substitute for professional medical care. Always follow your healthcare professional's instructions.         Kostas Horne Date Last Reviewed: 7/1/2016  © 5614-6900 [employer] [address]. [address] [facility] [place], [address] [place]. All rights reserved. This information is not intended as a substitute for professional medical care.  Always follow your healthcare professional' All women should be familiar with the potential benefits and risks of breast cancer screening with mammograms.      Cervical cancer All women in this age group, except women who have had a complete hysterectomy Pap test every 3 years or Pap test with human Hepatitis A Women at increased risk for infection – talk with your healthcare provider 2 doses given at least 6 months apart   Hepatitis B Women at increased risk for infection – talk with your healthcare provider 3 doses over 6 months; second dose should Use of daily aspirin Women ages 54 and up in this age group who are at risk for cardiovascular health problems such as stroke When your risk is known   Use of tobacco and the health effects it can cause All women in this age group Every exam   [address]

## 2018-11-19 ENCOUNTER — TELEPHONE (OUTPATIENT)
Dept: FAMILY MEDICINE CLINIC | Facility: CLINIC | Age: 48
End: 2018-11-19

## 2018-11-19 RX ORDER — METRONIDAZOLE 500 MG/1
500 TABLET ORAL 2 TIMES DAILY
Qty: 14 TABLET | Refills: 0 | Status: SHIPPED | OUTPATIENT
Start: 2018-11-19 | End: 2018-11-26

## 2018-11-19 NOTE — TELEPHONE ENCOUNTER
----- Message from CHAPIN Salomon FNP-C sent at 11/19/2018  3:35 PM CST -----  Negative HPV and normal pap. Repeat in 3 years.

## 2018-11-19 NOTE — TELEPHONE ENCOUNTER
----- Message from CHAPIN Downing FNP-C sent at 11/19/2018  3:36 PM CST -----  Pap does show BV. Please send prescription for metronidazole 500 mg BID x 7 days. Avoid alcohol while on mediation and for 1-2 days after she completes it.         Notes gail

## 2018-11-19 NOTE — TELEPHONE ENCOUNTER
----- Message from CHAPIN Salamanca FNP-C sent at 11/19/2018  3:36 PM CST -----  Pap does show BV. Please send prescription for metronidazole 500 mg BID x 7 days. Avoid alcohol while on mediation and for 1-2 days after she completes it.

## 2018-11-19 NOTE — TELEPHONE ENCOUNTER
Patient advised of pap results and positive BV. Discussed antibiotic and instructions for use. Advised no ETOH. Verbalized understanding. [id number]

## 2018-12-02 ENCOUNTER — OFFICE VISIT (OUTPATIENT)
Dept: FAMILY MEDICINE CLINIC | Facility: CLINIC | Age: 48
End: 2018-12-02
Payer: COMMERCIAL

## 2018-12-02 DIAGNOSIS — H57.11: Primary | ICD-10-CM

## 2018-12-02 NOTE — PROGRESS NOTES
Patient presents at right eye pain and irritation since October. At the time, she had crusting and drainage and was treated with antibiotic eye drops. The drainage and crusting resolved. Persistent right eye irritation and some photophobia.   No blurred

## 2018-12-07 ENCOUNTER — MED REC SCAN ONLY (OUTPATIENT)
Dept: FAMILY MEDICINE CLINIC | Facility: CLINIC | Age: 48
End: 2018-12-07

## 2019-01-02 PROBLEM — R13.10 DYSPHAGIA: Status: ACTIVE | Noted: 2019-01-02

## 2019-01-02 PROBLEM — K21.9 GASTROESOPHAGEAL REFLUX DISEASE WITHOUT ESOPHAGITIS: Status: ACTIVE | Noted: 2019-01-02

## 2019-01-10 PROBLEM — M72.2 PLANTAR FASCIITIS, RIGHT: Status: ACTIVE | Noted: 2019-01-10

## 2019-01-17 ENCOUNTER — TELEPHONE (OUTPATIENT)
Dept: FAMILY MEDICINE CLINIC | Facility: CLINIC | Age: 49
End: 2019-01-17

## 2019-01-17 NOTE — TELEPHONE ENCOUNTER
Mariana Curran, RN   1/17/19 12:28 PM   Note      Pt is requesting a referral to a different GI doctor & possible ENT for her heartburn. She states she is seeing Dr. Venancio Hui & has tried a few medications & EGD & her heartburn has not improved at all.   She was

## 2019-01-17 NOTE — TELEPHONE ENCOUNTER
Pt called stating that she needs to speak to a nurse. Pt has been having major problems with her gastro doctor (May Das). Pt wants to address her problems with  nurse. Also Pt will need a recommendation. Referral to a new Gastro doctor.  Pt wants

## 2019-01-18 ENCOUNTER — HOSPITAL ENCOUNTER (OUTPATIENT)
Dept: CT IMAGING | Age: 49
End: 2019-01-18
Attending: INTERNAL MEDICINE
Payer: COMMERCIAL

## 2019-01-18 ENCOUNTER — HOSPITAL ENCOUNTER (OUTPATIENT)
Dept: CT IMAGING | Age: 49
Discharge: HOME | End: 2019-01-18
Attending: INTERNAL MEDICINE
Payer: COMMERCIAL

## 2019-01-18 DIAGNOSIS — R07.0 THROAT PAIN: ICD-10-CM

## 2019-01-18 LAB — CREAT SERPL-MCNC: 0.7 MG/DL (ref 0.55–1.02)

## 2019-01-18 PROCEDURE — 82565 ASSAY OF CREATININE: CPT

## 2019-01-18 PROCEDURE — 70491 CT SOFT TISSUE NECK W/DYE: CPT | Performed by: INTERNAL MEDICINE

## 2019-01-18 NOTE — TELEPHONE ENCOUNTER
Spoke with patient. She says they got the CT approved and has it scheduled for 7pm this evening. She says regardless of the results she is going to see ENT so I gave her the contact information for [facility].  Patient is very frustrated with her current G

## 2019-01-24 ENCOUNTER — HOSPITAL ENCOUNTER (OUTPATIENT)
Facility: HOSPITAL | Age: 49
Setting detail: HOSPITAL OUTPATIENT SURGERY
Discharge: HOME | End: 2019-01-24
Attending: INTERNAL MEDICINE | Admitting: INTERNAL MEDICINE
Payer: COMMERCIAL

## 2019-01-24 VITALS
DIASTOLIC BLOOD PRESSURE: 74 MMHG | SYSTOLIC BLOOD PRESSURE: 110 MMHG | OXYGEN SATURATION: 96 % | HEART RATE: 64 BPM | RESPIRATION RATE: 18 BRPM

## 2019-01-24 DIAGNOSIS — R13.10 PROBLEMS WITH SWALLOWING AND MASTICATION: ICD-10-CM

## 2019-01-24 DIAGNOSIS — R12 HEARTBURN: ICD-10-CM

## 2019-01-24 PROCEDURE — 4A1B7BZ MONITORING OF GASTROINTESTINAL PRESSURE, VIA NATURAL OR ARTIFICIAL OPENING: ICD-10-PCS | Performed by: INTERNAL MEDICINE

## 2019-01-24 NOTE — H&P
[facility] Patient Status:  Hospital Outpatient Surgery    3/13/1970 MRN [id number]   [facility] ENDOSCOPY Attending No att. providers found   Hosp Day # 0 PCP Pepe Verma MD     History o Migraine. , Disp: 9 tablet, Rfl: 5  •  hydrochlorothiazide 12.5 MG Oral Tab, Take 1 tablet (12.5 mg total) by mouth daily as needed. , Disp: 30 tablet, Rfl: 5  •  Amitriptyline HCl 25 MG Oral Tab, Take 1 tablet (25 mg total) by mouth nightly., Disp: 90 table tuberculosis, chronic cough, spitting up blood, cough up sputum, sleep apnea.   Genitourinary: Denies kidney stones, painful/difficult urination, frequent urinary infections, frequent urination, blood in urine, incontinence, kidney failure, prostate problem patient    Plan: Esophageal manometry  Risk/Benefits: The risks and benefits of the procedure were discussed in detail with the patient, including the risk of bleeding, infection, pain, sedation, and perforation.   The patient was also informed that polyps

## 2019-01-25 NOTE — OPERATIVE REPORT
GI Manometry Report      Name: Zeenat Malone     Date of procedure: 1/24/2019    Referring physician:Dr. Chip Morrow    Attending physician: Dr. June Santiago    Indication: Pharyngoesophageal dysphagia    Procedure: Esophageal Manometry: A solid state recording asse of swallows are ineffective. This is consistent with a minor disorder of peristalsis, known as ineffective esophageal motility. The median IRP 15.1 mmHg, which is within the normal limit, but at the borderline.       Recommendations:   1) Optimize reflux

## 2019-02-19 ENCOUNTER — OFFICE VISIT (OUTPATIENT)
Dept: FAMILY MEDICINE CLINIC | Facility: CLINIC | Age: 49
End: 2019-02-19
Payer: COMMERCIAL

## 2019-02-19 VITALS
HEIGHT: 66 IN | TEMPERATURE: 98 F | OXYGEN SATURATION: 98 % | SYSTOLIC BLOOD PRESSURE: 110 MMHG | HEART RATE: 64 BPM | BODY MASS INDEX: 27.8 KG/M2 | RESPIRATION RATE: 18 BRPM | DIASTOLIC BLOOD PRESSURE: 70 MMHG | WEIGHT: 173 LBS

## 2019-02-19 DIAGNOSIS — J34.1 RETENTION CYST OF NASAL SINUS: ICD-10-CM

## 2019-02-19 DIAGNOSIS — K21.9 GASTROESOPHAGEAL REFLUX DISEASE WITHOUT ESOPHAGITIS: Primary | ICD-10-CM

## 2019-02-19 DIAGNOSIS — G43.711 INTRACTABLE CHRONIC MIGRAINE WITHOUT AURA AND WITH STATUS MIGRAINOSUS: ICD-10-CM

## 2019-02-19 PROCEDURE — 99214 OFFICE O/P EST MOD 30 MIN: CPT | Performed by: NURSE PRACTITIONER

## 2019-02-19 RX ORDER — SUMATRIPTAN 100 MG/1
100 TABLET, FILM COATED ORAL EVERY 2 HOUR PRN
Qty: 9 TABLET | Refills: 5 | Status: CANCELLED | OUTPATIENT
Start: 2019-02-19

## 2019-02-19 RX ORDER — METOCLOPRAMIDE 10 MG/1
10 TABLET ORAL
Qty: 120 TABLET | Refills: 0 | Status: SHIPPED | OUTPATIENT
Start: 2019-02-19 | End: 2019-03-21

## 2019-02-19 RX ORDER — TOPIRAMATE 50 MG/1
50 TABLET, FILM COATED ORAL 2 TIMES DAILY
Qty: 180 TABLET | Refills: 1 | Status: SHIPPED | OUTPATIENT
Start: 2019-02-19 | End: 2019-08-01 | Stop reason: DRUGHIGH

## 2019-02-19 RX ORDER — PROPRANOLOL HYDROCHLORIDE 20 MG/1
20 TABLET ORAL 2 TIMES DAILY
Qty: 60 TABLET | Refills: 0 | Status: SHIPPED | OUTPATIENT
Start: 2019-02-19 | End: 2019-03-03

## 2019-02-19 NOTE — PATIENT INSTRUCTIONS
Tips to Control Acid Reflux    To control acid reflux, you’ll need to make some basic diet and lifestyle changes. The simple steps outlined below may be all you’ll need to ease discomfort. Watch what you eat  · Avoid fatty foods and spicy foods.   · Eat Raquel Enciso will be given to you by the pharmacist with each prescription and refill. Be sure to read this information carefully each time. Talk to your pediatrician regarding the use of this medicine in children. Special care may be needed.   What Store at room temperature between 20 and 25 degrees C (68 and 77 degrees F). Protect from light. Keep container tightly closed. Throw away any unused medicine after the expiration date.   What should I tell my health care provider before I take this medicin Do not treat yourself for high fever. Ask your doctor or health care professional for advice. You may get drowsy or dizzy. Do not drive, use machinery, or do anything that needs mental alertness until you know how this drug affects you.  Do not stand or si

## 2019-02-19 NOTE — PROGRESS NOTES
Chief Complaint:   Patient presents with:  Test Results: from gastro for heart burn     HPI:   This is a 46year old female presenting for heartburn follow-up. She has been seeing GI for this.  Colonoscopy in September 2018 was normal- due to repeat in 10 y W/BIOPSY(S), CERVIX  2002   • ESOPHAGEAL MANOMETRY N/A 1/24/2019    Performed by Darya Duff MD at [facility] ENDOSCOPY     Social History:  Social History    Tobacco Use      Smoking status: Former Smoker        Packs/day: 1.00        Years: 22.00        Pa for chest pain, palpitations and leg swelling. Gastrointestinal: Positive for heartburn. Negative for nausea, vomiting, abdominal pain, diarrhea and blood in stool. Musculoskeletal: Negative for myalgias, back pain, joint swelling and joint pain.    Ski with the metoclopramide in 2 weeks. 2. Intractable chronic migraine without aura and with status migrainosus  -Stable, CPM  -topiramate 50 MG Oral Tab; Take 1 tablet (50 mg total) by mouth 2 (two) times daily.    -Propranolol HCl 20 MG Oral Tab;  Take 1

## 2019-02-20 ENCOUNTER — TELEPHONE (OUTPATIENT)
Dept: FAMILY MEDICINE CLINIC | Facility: CLINIC | Age: 49
End: 2019-02-20

## 2019-02-20 DIAGNOSIS — J34.89 SINUS MUCOSAL THICKENING: Primary | ICD-10-CM

## 2019-02-20 PROBLEM — Z12.11 ENCOUNTER FOR SCREENING COLONOSCOPY: Status: RESOLVED | Noted: 2018-06-06 | Resolved: 2019-02-20

## 2019-02-20 NOTE — TELEPHONE ENCOUNTER
I saw Dasia Travis in the office yesterday. We talked primarily about her heartburn, but we did discuss ENT briefly.  I want to make sure she follows up with ENT- not necessarily for her heartburn, but because the CT of the neck she had done with GI showed some

## 2019-02-20 NOTE — TELEPHONE ENCOUNTER
Called patient and spoke with her. Advised her of information below. Patient states understanding.   Patient states that her daughter is coming in today at 3pm to see the same provider and is asking that the contact information for Dr. Nathalia Lord be given t

## 2019-03-03 DIAGNOSIS — G43.711 INTRACTABLE CHRONIC MIGRAINE WITHOUT AURA AND WITH STATUS MIGRAINOSUS: ICD-10-CM

## 2019-03-04 ENCOUNTER — TELEPHONE (OUTPATIENT)
Dept: FAMILY MEDICINE CLINIC | Facility: CLINIC | Age: 49
End: 2019-03-04

## 2019-03-04 RX ORDER — PROPRANOLOL HYDROCHLORIDE 20 MG/1
TABLET ORAL
Qty: 60 TABLET | Refills: 2 | Status: SHIPPED | OUTPATIENT
Start: 2019-03-04 | End: 2019-08-02

## 2019-03-04 NOTE — TELEPHONE ENCOUNTER
LOV 2/19/19  -Trial metoclopramide 10 mg qid- before meals and at bedtime. Pt states the med is working great. She wants to know if it can cause swelling in her feet? She has some swelling on the top of her feet and ankles. Its not pitting.   She use

## 2019-03-04 NOTE — TELEPHONE ENCOUNTER
Pt would like to talk to [facility], [facility] about her heart burn medication.

## 2019-03-04 NOTE — TELEPHONE ENCOUNTER
I spoke to Carlos who states ok to send in RX as well. RX sent. Pt aware of Angela's recommendations below.

## 2019-03-04 NOTE — TELEPHONE ENCOUNTER
It can cause fluid retention. We can try having her take it twice daily instead of three times daily and see if that helps. Continue with compression stockings and elevating lower extremities. Have her f/u with me in 1 month so we can see how she's doing.

## 2019-03-04 NOTE — TELEPHONE ENCOUNTER
Medication(s) to Refill:   Requested Prescriptions     Pending Prescriptions Disp Refills   • PROPRANOLOL HCL 20 MG Oral Tab [Pharmacy Med Name: Propranolol Hcl 20 Mg Tab Parp] 60 tablet 2     Sig: TAKE ONE TABLET BY MOUTH TWICE DAILY         Reason for Me

## 2019-04-01 RX ORDER — HYDROCHLOROTHIAZIDE 12.5 MG/1
TABLET ORAL
Qty: 90 TABLET | Refills: 0 | Status: SHIPPED | OUTPATIENT
Start: 2019-04-01 | End: 2019-06-29

## 2019-04-10 ENCOUNTER — TELEPHONE (OUTPATIENT)
Dept: FAMILY MEDICINE CLINIC | Facility: CLINIC | Age: 49
End: 2019-04-10

## 2019-04-10 NOTE — TELEPHONE ENCOUNTER
Patient's migraines increased to 2-3 times per week for about six weeks now, she thinks that she might benefit from a dose increase of her migraine medication topiramate 50 MG Oral Tab.

## 2019-04-10 NOTE — TELEPHONE ENCOUNTER
Please advise on med change or OV    LOV 2/19/19  \"Also with history of migraine headaches. Currently on propranolol 20 mg bid and topiramate 50 mg bid. Has sumatriptan on hand for abortive measures. Migraines are well controlled with this regimen.  Reques

## 2019-04-11 RX ORDER — TOPIRAMATE 25 MG/1
TABLET ORAL
Qty: 90 TABLET | Refills: 0 | Status: SHIPPED | OUTPATIENT
Start: 2019-04-11 | End: 2019-06-29

## 2019-04-11 NOTE — TELEPHONE ENCOUNTER
[facility] clarified that she wants pt to take Topiramate 50mg in AM and 75mg at night. Pt verbalizes understanding. Pt requested that 25mg tabs be sent.

## 2019-06-29 DIAGNOSIS — G44.221 CHRONIC TENSION-TYPE HEADACHE, INTRACTABLE: Primary | ICD-10-CM

## 2019-06-29 RX ORDER — HYDROCHLOROTHIAZIDE 12.5 MG/1
TABLET ORAL
Qty: 90 TABLET | Refills: 0 | Status: SHIPPED | OUTPATIENT
Start: 2019-06-29 | End: 2019-08-01

## 2019-06-29 NOTE — TELEPHONE ENCOUNTER
Medication(s) to Refill:   Requested Prescriptions     Pending Prescriptions Disp Refills   • HYDROCHLOROTHIAZIDE 12.5 MG Oral Tab [Pharmacy Med Name: Hydrochlorothiazide 12.5 Mg Tab Acco] 90 tablet 0     Sig: TAKE ONE TABLET BY MOUTH DAILY AS NEEDED

## 2019-06-29 NOTE — TELEPHONE ENCOUNTER
Medication(s) to Refill:   Requested Prescriptions     Pending Prescriptions Disp Refills   • topiramate 25 MG Oral Tab [Pharmacy Med Name: Topiramate 25 Mg Tab Cipl] 90 tablet 0     Sig: Take 1 tablet (25mg) by mouth nightly (in addition to topiramate 50m

## 2019-07-01 RX ORDER — TOPIRAMATE 25 MG/1
TABLET ORAL
Qty: 90 TABLET | Refills: 0 | Status: SHIPPED | OUTPATIENT
Start: 2019-07-01 | End: 2019-08-01

## 2019-08-01 ENCOUNTER — OFFICE VISIT (OUTPATIENT)
Dept: FAMILY MEDICINE CLINIC | Facility: CLINIC | Age: 49
End: 2019-08-01
Payer: COMMERCIAL

## 2019-08-01 VITALS
DIASTOLIC BLOOD PRESSURE: 70 MMHG | SYSTOLIC BLOOD PRESSURE: 110 MMHG | BODY MASS INDEX: 26.68 KG/M2 | OXYGEN SATURATION: 98 % | HEIGHT: 66 IN | WEIGHT: 166 LBS | HEART RATE: 72 BPM | RESPIRATION RATE: 16 BRPM

## 2019-08-01 DIAGNOSIS — K21.9 GASTROESOPHAGEAL REFLUX DISEASE WITHOUT ESOPHAGITIS: ICD-10-CM

## 2019-08-01 DIAGNOSIS — G44.221 CHRONIC TENSION-TYPE HEADACHE, INTRACTABLE: Primary | ICD-10-CM

## 2019-08-01 PROCEDURE — 99214 OFFICE O/P EST MOD 30 MIN: CPT | Performed by: NURSE PRACTITIONER

## 2019-08-01 RX ORDER — HYDROCHLOROTHIAZIDE 12.5 MG/1
TABLET ORAL
Qty: 90 TABLET | Refills: 0 | Status: SHIPPED | OUTPATIENT
Start: 2019-08-01 | End: 2020-01-02

## 2019-08-01 RX ORDER — PANTOPRAZOLE SODIUM 40 MG/1
40 TABLET, DELAYED RELEASE ORAL
Qty: 30 TABLET | Refills: 0 | Status: SHIPPED | OUTPATIENT
Start: 2019-08-01 | End: 2020-01-02 | Stop reason: ALTCHOICE

## 2019-08-01 RX ORDER — TOPIRAMATE 25 MG/1
TABLET ORAL
Qty: 90 TABLET | Refills: 0 | COMMUNITY
Start: 2019-08-01 | End: 2019-10-14

## 2019-08-01 NOTE — PROGRESS NOTES
Chief Complaint:   Patient presents with:  Migraine    HPI:   This is a 44year old female presenting for f/u of migraine headaches and GERD. Migraines  Currently on topiramate for prevention. Takes 50 mg in the morning and 75 mg qhs.  Initially felt imp No    Family History:  Family History   Problem Relation Age of Onset   • Heart Attack Father         Heart Attack   • Breast Cancer Maternal Grandmother 67   • Neurological Disorder Mother         Alzheimers     Allergies:  No Known Allergies  Current Med blood in stool. Musculoskeletal: Negative for myalgias, back pain, joint swelling and joint pain. Skin: Negative for pallor, rash and wound. Neurological: Positive for headaches. Negative for dizziness, weakness, light-headedness and numbness.      EX headaches continue to worsen. - topiramate 25 MG Oral Tab; Take 75 mg in the morning and 75 mg nightly for a total of 150 mg/day.       2. Gastroesophageal reflux disease without esophagitis  -Will trial PPI for reflux.   - Pantoprazole Sodium 40 MG Oral T

## 2019-08-01 NOTE — PATIENT INSTRUCTIONS
GERD (Adult)    The esophagus is a tube that carries food from the mouth to the stomach. A valve (the LES, lower esophageal sphincter) at the lower end of the esophagus prevents stomach acid from flowing upward.  When this valve doesn't work properly, sto · If your symptoms occur during sleep, use a foam wedge to elevate your upper body (not just your head.) Or, place 4\" blocks under the head of your bed. Or use 2 bed risers under your bedframe.   Medicines  If needed, medicines can help relieve the symptom © 0958-0683 [employer] [address]. [address] [facility], [address] [place]. All rights reserved. This information is not intended as a substitute for professional medical care. Always follow your healthcare professional's instructions.         Self-Ca Any of the following can be signs:  · Dull pain or feeling of pressure in a tight band around your head  · Pain in your neck or shoulders  · Headache without a definite beginning or end  · Headache after an activity such as driving or working on a computer

## 2019-08-02 DIAGNOSIS — G43.711 INTRACTABLE CHRONIC MIGRAINE WITHOUT AURA AND WITH STATUS MIGRAINOSUS: ICD-10-CM

## 2019-08-05 RX ORDER — PROPRANOLOL HYDROCHLORIDE 20 MG/1
TABLET ORAL
Qty: 60 TABLET | Refills: 3 | Status: SHIPPED | OUTPATIENT
Start: 2019-08-05 | End: 2019-12-03

## 2019-08-05 NOTE — TELEPHONE ENCOUNTER
Spoke with patient informed her medication was approved and sent to her pharmacy with 3 refills.  Patient said she will call us back to schedule her physical.

## 2019-08-30 DIAGNOSIS — G44.221 CHRONIC TENSION-TYPE HEADACHE, INTRACTABLE: ICD-10-CM

## 2019-08-30 RX ORDER — TOPIRAMATE 25 MG/1
75 TABLET ORAL 2 TIMES DAILY
Qty: 180 TABLET | Refills: 2 | Status: SHIPPED | OUTPATIENT
Start: 2019-08-30 | End: 2019-10-14

## 2019-08-30 NOTE — TELEPHONE ENCOUNTER
Medication(s) to Refill:   Requested Prescriptions     Pending Prescriptions Disp Refills   • topiramate 25 MG Oral Tab [Pharmacy Med Name: Topiramate 25 Mg Tab Cipl] 90 tablet 0     Sig: TAKE ONE TABLET BY MOUTH NIGHTLY in addition to topiramATE 50 MG TAB

## 2019-10-14 DIAGNOSIS — G43.711 INTRACTABLE CHRONIC MIGRAINE WITHOUT AURA AND WITH STATUS MIGRAINOSUS: ICD-10-CM

## 2019-10-14 RX ORDER — TOPIRAMATE 50 MG/1
TABLET, FILM COATED ORAL
Qty: 180 TABLET | Refills: 0 | Status: SHIPPED | OUTPATIENT
Start: 2019-10-14 | End: 2020-01-02

## 2019-10-14 NOTE — TELEPHONE ENCOUNTER
Medication(s) to Refill:   Requested Prescriptions     Pending Prescriptions Disp Refills   • TOPIRAMATE 50 MG Oral Tab [Pharmacy Med Name: Topiramate 50 Mg Tab Cipl] 180 tablet 0     Sig: TAKE ONE TABLET BY MOUTH TWICE DAILY         Reason for Medication

## 2019-12-03 DIAGNOSIS — G43.711 INTRACTABLE CHRONIC MIGRAINE WITHOUT AURA AND WITH STATUS MIGRAINOSUS: ICD-10-CM

## 2019-12-03 RX ORDER — PROPRANOLOL HYDROCHLORIDE 20 MG/1
20 TABLET ORAL 2 TIMES DAILY
Qty: 60 TABLET | Refills: 0 | Status: SHIPPED | OUTPATIENT
Start: 2019-12-03 | End: 2020-01-03

## 2019-12-03 NOTE — TELEPHONE ENCOUNTER
Last office visit:   8/1/19    Appointment scheduled with:  12/9/19 with Ulysses Wilson    Requested medication: PROPRANOLOL HCL 20 MG Oral Tab    Pharmacy:[id number]    Patient is scheduled to be seen on Monday but does not have enough meds to last her until then,

## 2019-12-03 NOTE — TELEPHONE ENCOUNTER
LF 8/5/19 #60 w 3 rf  LOV 8/1/19 with Sherrine Prieto   Return in about 1 month (around 9/1/2019).      Pt is scheduled for 12/9/19 with Jose Alejandro Prieto

## 2019-12-04 RX ORDER — PROPRANOLOL HYDROCHLORIDE 20 MG/1
TABLET ORAL
Qty: 60 TABLET | Refills: 0 | Status: SHIPPED | OUTPATIENT
Start: 2019-12-04 | End: 2020-01-02

## 2020-01-02 ENCOUNTER — OFFICE VISIT (OUTPATIENT)
Dept: FAMILY MEDICINE CLINIC | Facility: CLINIC | Age: 50
End: 2020-01-02
Payer: COMMERCIAL

## 2020-01-02 VITALS
HEIGHT: 66 IN | DIASTOLIC BLOOD PRESSURE: 76 MMHG | WEIGHT: 167 LBS | SYSTOLIC BLOOD PRESSURE: 114 MMHG | OXYGEN SATURATION: 97 % | HEART RATE: 97 BPM | RESPIRATION RATE: 16 BRPM | BODY MASS INDEX: 26.84 KG/M2 | TEMPERATURE: 98 F

## 2020-01-02 DIAGNOSIS — B00.1 RECURRENT COLD SORES: ICD-10-CM

## 2020-01-02 DIAGNOSIS — R68.89 FLU-LIKE SYMPTOMS: Primary | ICD-10-CM

## 2020-01-02 DIAGNOSIS — I83.893 VARICOSE VEINS OF BOTH LEGS WITH EDEMA: ICD-10-CM

## 2020-01-02 DIAGNOSIS — G43.711 INTRACTABLE CHRONIC MIGRAINE WITHOUT AURA AND WITH STATUS MIGRAINOSUS: ICD-10-CM

## 2020-01-02 PROCEDURE — 99214 OFFICE O/P EST MOD 30 MIN: CPT | Performed by: NURSE PRACTITIONER

## 2020-01-02 RX ORDER — HYDROCHLOROTHIAZIDE 12.5 MG/1
TABLET ORAL
Qty: 90 TABLET | Refills: 0 | Status: SHIPPED | OUTPATIENT
Start: 2020-01-02 | End: 2020-04-28

## 2020-01-02 RX ORDER — TOPIRAMATE 25 MG/1
25 TABLET ORAL 2 TIMES DAILY
Qty: 180 TABLET | Refills: 0 | Status: SHIPPED | OUTPATIENT
Start: 2020-01-02 | End: 2020-07-24

## 2020-01-02 RX ORDER — PANTOPRAZOLE SODIUM 40 MG/1
40 TABLET, DELAYED RELEASE ORAL
Qty: 30 TABLET | Refills: 0 | Status: CANCELLED | OUTPATIENT
Start: 2020-01-02

## 2020-01-02 RX ORDER — VALACYCLOVIR HYDROCHLORIDE 1 G/1
2 TABLET, FILM COATED ORAL EVERY 12 HOURS SCHEDULED
Qty: 4 TABLET | Refills: 0 | Status: SHIPPED | OUTPATIENT
Start: 2020-01-02 | End: 2020-01-02

## 2020-01-02 RX ORDER — VALACYCLOVIR HYDROCHLORIDE 500 MG/1
500 TABLET, FILM COATED ORAL DAILY
Qty: 90 TABLET | Refills: 0 | Status: SHIPPED | OUTPATIENT
Start: 2020-01-02 | End: 2021-04-29

## 2020-01-02 RX ORDER — PROPRANOLOL HYDROCHLORIDE 20 MG/1
20 TABLET ORAL 2 TIMES DAILY
Qty: 60 TABLET | Refills: 0 | Status: SHIPPED | OUTPATIENT
Start: 2020-01-02 | End: 2020-03-07

## 2020-01-02 RX ORDER — TOPIRAMATE 50 MG/1
50 TABLET, FILM COATED ORAL 2 TIMES DAILY
Qty: 180 TABLET | Refills: 0 | Status: SHIPPED | OUTPATIENT
Start: 2020-01-02 | End: 2020-01-13

## 2020-01-02 RX ORDER — VALACYCLOVIR HYDROCHLORIDE 1 G/1
2 TABLET, FILM COATED ORAL EVERY 12 HOURS SCHEDULED
Qty: 4 TABLET | Refills: 0 | Status: SHIPPED | OUTPATIENT
Start: 2020-01-02 | End: 2020-01-03

## 2020-01-02 RX ORDER — VALACYCLOVIR HYDROCHLORIDE 500 MG/1
500 TABLET, FILM COATED ORAL DAILY
Qty: 90 TABLET | Refills: 0 | Status: SHIPPED | OUTPATIENT
Start: 2020-01-02 | End: 2020-01-02

## 2020-01-02 NOTE — PROGRESS NOTES
Chief Complaint:   Patient presents with:  Cough: sore throat, fever    HPI:   This is a 44year old female presenting for evaluation of flu-like symptoms x 2 days. Symptoms started abruptly with sore throat and body aches.  Associated symptoms include fati Drug use: No    Family History:  Family History   Problem Relation Age of Onset   • Heart Attack Father         Heart Attack   • Breast Cancer Maternal Grandmother 67   • Neurological Disorder Mother         Alzheimers     Allergies:  No Known Allergies Cardiovascular: Negative for chest pain and palpitations. Gastrointestinal: Negative for nausea, vomiting, abdominal pain, diarrhea, constipation and blood in stool. Musculoskeletal: Positive for myalgias.  Negative for back pain, joint swelling and j hepatosplenomegaly. There is no tenderness. Musculoskeletal: Normal range of motion. Neurological: She is alert and oriented to person, place, and time. Skin: Skin is warm and dry. She is not diaphoretic.       ASSESSMENT AND PLAN:   1. Flu-like sympt

## 2020-01-02 NOTE — PATIENT INSTRUCTIONS
The Flu (Influenza)     The virus that causes the flu spreads through the air in droplets when someone who has the flu coughs, sneezes, laughs, or talks. The flu (influenza) is an infection that affects your respiratory tract.  This tract is made up of The flu usually gets better after 7 days or so. In some cases, your healthcare provider may prescribe an antiviral medicine. This may help you get well a little sooner.  For the medicine to help, you need to take it as soon as possible (ideally within 48 ho · One of the best ways to prevent the flu is to get a flu vaccine each year. The CDC recommends that all people 10months of age and older get a flu vaccine every year. The virus that causes the flu changes from year to year.  For that reason, healthcare pro · Rub your hands together briskly, cleaning the backs of your hands, the palms, between your fingers, and up the wrists. · Rub until the gel is gone and your hands are completely dry.   Preventing the flu in healthcare settings  The flu is a special concer Talk to your pediatrician regarding the use of this medicine in children. While this drug may be prescribed for children as young as 12 years for selected conditions, precautions do apply. What side effects may I notice from receiving this medicine?   Side If you have the flu, you may be at an increased risk of developing confusion or abnormal behavior. This occurs early in the illness, and more frequently in children and teens. These events are not common but may result in accidental injury to the patient.

## 2020-01-03 ENCOUNTER — TELEPHONE (OUTPATIENT)
Dept: FAMILY MEDICINE CLINIC | Facility: CLINIC | Age: 50
End: 2020-01-03

## 2020-01-03 RX ORDER — BENZONATATE 100 MG/1
CAPSULE ORAL
Qty: 30 CAPSULE | Refills: 0 | Status: SHIPPED | OUTPATIENT
Start: 2020-01-03 | End: 2021-01-28

## 2020-01-03 RX ORDER — ALBUTEROL SULFATE 90 UG/1
AEROSOL, METERED RESPIRATORY (INHALATION)
Qty: 1 INHALER | Refills: 0 | Status: SHIPPED | OUTPATIENT
Start: 2020-01-03 | End: 2021-11-16

## 2020-01-03 RX ORDER — PREDNISONE 20 MG/1
40 TABLET ORAL DAILY
Qty: 10 TABLET | Refills: 0 | Status: SHIPPED | OUTPATIENT
Start: 2020-01-03 | End: 2020-01-08

## 2020-01-03 NOTE — TELEPHONE ENCOUNTER
Glory Fulton, please advise. Dr Mitch Haney was thinking you might want to send medrol or prednisone.

## 2020-01-03 NOTE — TELEPHONE ENCOUNTER
Pt was seen yesterday and has the flu. Pt states she is coughing so hard that her chest hurts. She would like to know what she can take for this?

## 2020-01-03 NOTE — TELEPHONE ENCOUNTER
Yes, ok for prednisone burst 40 mg x 5 days. May also send albuterol inhaler 1-2 puffs q 4-6 hrs prn and benzonatate 100 mg take 1-2 tid prn. F/u if no improvement.

## 2020-01-03 NOTE — TELEPHONE ENCOUNTER
RXs sent. Pt aware.   I confirmed the quantity with Dr Leo Desai on the benzonatate and confirmed it was ok to send the albuterol-(came up with a drug drug interaction with the propanolol.)

## 2020-01-06 ENCOUNTER — TELEPHONE (OUTPATIENT)
Dept: FAMILY MEDICINE CLINIC | Facility: CLINIC | Age: 50
End: 2020-01-06

## 2020-01-06 ENCOUNTER — HOSPITAL ENCOUNTER (OUTPATIENT)
Dept: GENERAL RADIOLOGY | Age: 50
Discharge: HOME | End: 2020-01-06
Attending: NURSE PRACTITIONER
Payer: COMMERCIAL

## 2020-01-06 DIAGNOSIS — R05.9 COUGH: Primary | ICD-10-CM

## 2020-01-06 DIAGNOSIS — R05.9 COUGH: ICD-10-CM

## 2020-01-06 PROCEDURE — 71046 X-RAY EXAM CHEST 2 VIEWS: CPT | Performed by: NURSE PRACTITIONER

## 2020-01-06 RX ORDER — METHYLPREDNISOLONE 4 MG/1
TABLET ORAL
Qty: 1 KIT | Refills: 0 | Status: SHIPPED | OUTPATIENT
Start: 2020-01-06 | End: 2020-01-16 | Stop reason: ALTCHOICE

## 2020-01-06 NOTE — TELEPHONE ENCOUNTER
Pt called, LOV with beth 1/2/20 dx with flu. Pt states still coughing and feels like someone is beating on chest. She is using inhaler and couch medication, but giving minimal relief. She feels very lethargic, like she can't do anything.      Pt is wonderin

## 2020-01-06 NOTE — TELEPHONE ENCOUNTER
Sounded flu-like at OV, but she didn't want to do respiratory panel. [id number] for STAT chest x-ray to r/o pneumonia. Continue steroid and inhaler prn. [id number] [address] for note for work.

## 2020-01-06 NOTE — TELEPHONE ENCOUNTER
Spoke to patient and gave her Angela's instructions. She is agreeable to chest x-ray so I placed this order. I told her unless it is urgent we will most likely get her results tomorrow. Work note sent to patient's my chart. Order placed.

## 2020-01-07 NOTE — TELEPHONE ENCOUNTER
Patient called for XR results   No pneumonia  Suspect pt has pleurisy following viral illness  Asked her to alternate ibuprofen and tylenol   Will stop prednisone and start medrol dose pack - seems she will need longer dose anyway.    For next 48 hours - us

## 2020-01-08 ENCOUNTER — TELEPHONE (OUTPATIENT)
Dept: FAMILY MEDICINE CLINIC | Facility: CLINIC | Age: 50
End: 2020-01-08

## 2020-01-08 NOTE — TELEPHONE ENCOUNTER
Patient called she was just seen on 1/6/20 and was diagnosed with Pleurisy. She was given medication butpatient is still coughing and fatigues.  Patient wants to know how long she should be out of work because she needs a doctors note

## 2020-01-08 NOTE — TELEPHONE ENCOUNTER
Called and spoke with patient for condition update. Feeling about the same. Started Medrol pack and is doing scheduled albuterol q 6 hrs. Feels very fatigued with little energy. Denies SOB/dyspnea.  No pain, but does have some tightness in her chest. Encour

## 2020-01-10 ENCOUNTER — TELEPHONE (OUTPATIENT)
Dept: FAMILY MEDICINE CLINIC | Facility: CLINIC | Age: 50
End: 2020-01-10

## 2020-01-10 NOTE — TELEPHONE ENCOUNTER
Pt stopped by office to bring paperwork to be filled out for work. Pt filled out LINDY, she would like faxed to employer when completed. Copy of form placed in black file folder at . Original form was placed in Angela's folder attached to LINDY.

## 2020-01-10 NOTE — TELEPHONE ENCOUNTER
Pt dropped off paperwork for medical marijuana for Mauri Bonilla to fill out. Michele Richards took folder with paperwork. Please call pt when completed, thank you!

## 2020-01-10 NOTE — TELEPHONE ENCOUNTER
Medical marijuana paperwork received. Discussed with Dr. Bobo Rios who is willing to sign off. Patient will need to schedule OV with Dr. Bobo Rios for documentation purposes in order for card to be approved (papers must be signed by MD, FNP cannot sign off).  Pleas

## 2020-01-13 ENCOUNTER — TELEPHONE (OUTPATIENT)
Dept: FAMILY MEDICINE CLINIC | Facility: CLINIC | Age: 50
End: 2020-01-13

## 2020-01-13 DIAGNOSIS — G43.711 INTRACTABLE CHRONIC MIGRAINE WITHOUT AURA AND WITH STATUS MIGRAINOSUS: ICD-10-CM

## 2020-01-13 RX ORDER — TOPIRAMATE 50 MG/1
50 TABLET, FILM COATED ORAL 2 TIMES DAILY
Qty: 180 TABLET | Refills: 0 | COMMUNITY
Start: 2020-01-13 | End: 2020-04-28

## 2020-01-13 NOTE — TELEPHONE ENCOUNTER
Please advise on correct topiramate dose    Sig:   Take 1 tablet (50 mg total) by mouth 2 (two) times daily. Take 3 tablets by mouth (75 mg ) twice daily. Total of 150 mg daily.     There is also a RX on file for :  topiramate 25 MG Oral Tab 180 tablet 0 1/

## 2020-01-16 ENCOUNTER — OFFICE VISIT (OUTPATIENT)
Dept: FAMILY MEDICINE CLINIC | Facility: CLINIC | Age: 50
End: 2020-01-16
Payer: COMMERCIAL

## 2020-01-16 VITALS
OXYGEN SATURATION: 99 % | HEIGHT: 66 IN | WEIGHT: 170 LBS | DIASTOLIC BLOOD PRESSURE: 70 MMHG | SYSTOLIC BLOOD PRESSURE: 110 MMHG | RESPIRATION RATE: 16 BRPM | TEMPERATURE: 98 F | BODY MASS INDEX: 27.32 KG/M2 | HEART RATE: 66 BPM

## 2020-01-16 DIAGNOSIS — K21.9 GASTROESOPHAGEAL REFLUX DISEASE WITHOUT ESOPHAGITIS: ICD-10-CM

## 2020-01-16 DIAGNOSIS — Z00.00 LABORATORY EXAMINATION ORDERED AS PART OF A ROUTINE GENERAL MEDICAL EXAMINATION: ICD-10-CM

## 2020-01-16 DIAGNOSIS — G44.221 CHRONIC TENSION-TYPE HEADACHE, INTRACTABLE: Primary | ICD-10-CM

## 2020-01-16 DIAGNOSIS — Z79.899 MEDICAL MARIJUANA USE: ICD-10-CM

## 2020-01-16 DIAGNOSIS — Z12.39 SCREENING FOR BREAST CANCER: ICD-10-CM

## 2020-01-16 DIAGNOSIS — K22.4 INEFFECTIVE ESOPHAGEAL MOTILITY: ICD-10-CM

## 2020-01-16 PROCEDURE — 99214 OFFICE O/P EST MOD 30 MIN: CPT | Performed by: FAMILY MEDICINE

## 2020-01-16 NOTE — TELEPHONE ENCOUNTER
I called pt to let her know Lewis Zhao completed the paperwork for Richard and it has been faxed. Pt was shocked \"it took this long! I guess I know why I didn't get paid\". I apologized but reminded her that our policy is 6-66 days for paperwork.   The paper

## 2020-01-16 NOTE — PROGRESS NOTES
[address] [facility] Office Note  Chief Complaint:   Patient presents with:  Complete Form: medical cannabis      HPI:   This is a 44year old female coming in for  HPI  Migraines   Has tried multiple prophylactic medications - still not al 30 capsule 0   • Albuterol Sulfate HFA (PROAIR HFA) 108 (90 Base) MCG/ACT Inhalation Aero Soln 1-2 puffs tid prn cough or shortness of breath 1 Inhaler 0   • Propranolol HCl 20 MG Oral Tab Take 1 tablet (20 mg total) by mouth 2 (two) times daily.  60 tablet as of this encounter: 170 lb (77.1 kg). Vital signs reviewed. Appears stated age, well groomed. Physical Exam   Constitutional: She is oriented to person, place, and time. She appears well-developed and well-nourished.    HENT:   Mouth/Throat: Oropharynx call with any side effects or complications from the treatments as a result of today.

## 2020-03-06 DIAGNOSIS — G43.711 INTRACTABLE CHRONIC MIGRAINE WITHOUT AURA AND WITH STATUS MIGRAINOSUS: ICD-10-CM

## 2020-03-06 NOTE — TELEPHONE ENCOUNTER
Patient called checking status of refill request. Patient has none left. She needs asap, Patient was just seen on 1/16/2020 for her follow up. Patient does not understand why no refills were are her January prescriptions.  Patient said she is only to follow

## 2020-03-06 NOTE — TELEPHONE ENCOUNTER
Medication(s) to Refill:   Requested Prescriptions     Pending Prescriptions Disp Refills   • PROPRANOLOL HCL 20 MG Oral Tab [Pharmacy Med Name: Propranolol Hydrochloride 20 Mg Tab Acta] 60 tablet 0     Sig: TAKE ONE TABLET BY MOUTH TWICE DAILY         Inell Fear

## 2020-03-07 RX ORDER — PROPRANOLOL HYDROCHLORIDE 20 MG/1
TABLET ORAL
Qty: 60 TABLET | Refills: 3 | Status: SHIPPED | OUTPATIENT
Start: 2020-03-07 | End: 2021-01-28

## 2020-04-27 DIAGNOSIS — G43.711 INTRACTABLE CHRONIC MIGRAINE WITHOUT AURA AND WITH STATUS MIGRAINOSUS: ICD-10-CM

## 2020-04-28 RX ORDER — HYDROCHLOROTHIAZIDE 12.5 MG/1
TABLET ORAL
Qty: 90 TABLET | Refills: 1 | Status: SHIPPED | OUTPATIENT
Start: 2020-04-28 | End: 2021-01-28

## 2020-04-28 RX ORDER — TOPIRAMATE 50 MG/1
50 TABLET, FILM COATED ORAL 2 TIMES DAILY
Qty: 180 TABLET | Refills: 1 | Status: SHIPPED | OUTPATIENT
Start: 2020-04-28 | End: 2020-07-24

## 2020-07-23 ENCOUNTER — TELEPHONE (OUTPATIENT)
Dept: FAMILY MEDICINE CLINIC | Facility: CLINIC | Age: 50
End: 2020-07-23

## 2020-07-23 DIAGNOSIS — G43.711 INTRACTABLE CHRONIC MIGRAINE WITHOUT AURA AND WITH STATUS MIGRAINOSUS: ICD-10-CM

## 2020-07-23 NOTE — TELEPHONE ENCOUNTER
Patient called she stated she is taking topiramate 175 mg a day. She stated she was confused why her medication was decreased. Her call back number is [phone] Her pharmacy is [facility] [address] in [place].

## 2020-07-23 NOTE — TELEPHONE ENCOUNTER
Please see below message. Rx for Topamax 50mg BID was refilled for patient. However, previously patient was instructed to take 75mg BID. Please advise. Thank you!

## 2020-07-24 RX ORDER — TOPIRAMATE 25 MG/1
75 TABLET ORAL 2 TIMES DAILY
Qty: 180 TABLET | Refills: 0 | Status: SHIPPED | OUTPATIENT
Start: 2020-07-24 | End: 2020-09-09

## 2020-07-24 NOTE — TELEPHONE ENCOUNTER
Reviewed notes from Jan 2020 visit, medication was listed as Topiramate 50 mg BID, also went back to Feb 2019 OV note and was on same dose. There is no documentation that she was on 75 mg BID.   If she feels she needs to be on the 75 mg dose, she needs a f

## 2020-07-24 NOTE — TELEPHONE ENCOUNTER
Constance Sullivan, thank you for clarifying. Let's do the 25 mg tabs (take 3 tabs BID  , so a total of 75 mg BID. )    She is still due for a f-u appt every 6 months while on this medication.  Thanks

## 2020-07-24 NOTE — TELEPHONE ENCOUNTER
Discussed dosing with pt. I cancelled all RXs with the pharmacy. Pt wanted her RX sent to her new pharmacy [facility] [address]. RX sent. Pt aware she is due for a f/up.

## 2020-07-24 NOTE — TELEPHONE ENCOUNTER
Please review previous refills on med list and TE 1/13/20, and OV 1/2/20. Pt has had RX for 25mg take 3 tabs BID and has some times had to take a 50mg and 25mg to equal 75mg bid.       CHAPIN Black, GIOP-C     \" 1/13/20 12:45 PM   Note      She takes

## 2020-09-09 DIAGNOSIS — G43.711 INTRACTABLE CHRONIC MIGRAINE WITHOUT AURA AND WITH STATUS MIGRAINOSUS: ICD-10-CM

## 2020-09-09 RX ORDER — TOPIRAMATE 25 MG/1
75 TABLET ORAL 2 TIMES DAILY
Qty: 180 TABLET | Refills: 0 | Status: SHIPPED | OUTPATIENT
Start: 2020-09-09 | End: 2020-10-19

## 2020-10-15 DIAGNOSIS — G43.711 INTRACTABLE CHRONIC MIGRAINE WITHOUT AURA AND WITH STATUS MIGRAINOSUS: ICD-10-CM

## 2020-10-19 RX ORDER — TOPIRAMATE 25 MG/1
75 TABLET ORAL 2 TIMES DAILY
Qty: 180 TABLET | Refills: 2 | Status: SHIPPED | OUTPATIENT
Start: 2020-10-19 | End: 2021-01-21

## 2021-01-20 DIAGNOSIS — G43.711 INTRACTABLE CHRONIC MIGRAINE WITHOUT AURA AND WITH STATUS MIGRAINOSUS: ICD-10-CM

## 2021-01-20 NOTE — TELEPHONE ENCOUNTER
Medication(s) to Refill:   Requested Prescriptions     Pending Prescriptions Disp Refills   • TOPIRAMATE 25 MG Oral Tab [Pharmacy Med Name: Topiramate 25 Mg Tab Cipl] 180 tablet 0     Sig: TAKE 3 TABLETS (75 MG TOTAL) BY MOUTH 2 (TWO) TIMES DAILY.

## 2021-01-21 RX ORDER — TOPIRAMATE 25 MG/1
75 TABLET ORAL 2 TIMES DAILY
Qty: 180 TABLET | Refills: 0 | Status: SHIPPED | OUTPATIENT
Start: 2021-01-21 | End: 2021-01-28

## 2021-01-22 NOTE — TELEPHONE ENCOUNTER
Received call from Loran Kehr who states Rx sent yesterday for TOPIRAMATE 25 MG TAB, confirmed pharmacy location. Verbal for Rx given to pharmacist, confirmed with read back.      Rx given verbally as sent by provider yesterday, 01/21/2021:     Marcell Kocher

## 2021-01-28 ENCOUNTER — OFFICE VISIT (OUTPATIENT)
Dept: FAMILY MEDICINE CLINIC | Facility: CLINIC | Age: 51
End: 2021-01-28
Payer: COMMERCIAL

## 2021-01-28 ENCOUNTER — LAB ENCOUNTER (OUTPATIENT)
Dept: LAB | Age: 51
End: 2021-01-28
Attending: NURSE PRACTITIONER
Payer: COMMERCIAL

## 2021-01-28 VITALS
HEART RATE: 76 BPM | BODY MASS INDEX: 26.84 KG/M2 | RESPIRATION RATE: 16 BRPM | WEIGHT: 167 LBS | SYSTOLIC BLOOD PRESSURE: 122 MMHG | HEIGHT: 66 IN | OXYGEN SATURATION: 98 % | DIASTOLIC BLOOD PRESSURE: 80 MMHG | TEMPERATURE: 98 F

## 2021-01-28 DIAGNOSIS — E55.9 VITAMIN D DEFICIENCY: ICD-10-CM

## 2021-01-28 DIAGNOSIS — G43.711 INTRACTABLE CHRONIC MIGRAINE WITHOUT AURA AND WITH STATUS MIGRAINOSUS: ICD-10-CM

## 2021-01-28 DIAGNOSIS — Z12.31 ENCOUNTER FOR SCREENING MAMMOGRAM FOR MALIGNANT NEOPLASM OF BREAST: ICD-10-CM

## 2021-01-28 DIAGNOSIS — N39.41 URGE INCONTINENCE OF URINE: ICD-10-CM

## 2021-01-28 DIAGNOSIS — Z00.00 LABORATORY EXAMINATION ORDERED AS PART OF A ROUTINE GENERAL MEDICAL EXAMINATION: ICD-10-CM

## 2021-01-28 DIAGNOSIS — Z00.00 ANNUAL PHYSICAL EXAM: Primary | ICD-10-CM

## 2021-01-28 DIAGNOSIS — R19.09 LUMP IN THE GROIN: ICD-10-CM

## 2021-01-28 LAB
ALBUMIN SERPL-MCNC: 4 G/DL (ref 3.4–5)
ALBUMIN/GLOB SERPL: 1.3 {RATIO} (ref 1–2)
ALP LIVER SERPL-CCNC: 59 U/L
ALT SERPL-CCNC: 17 U/L
ANION GAP SERPL CALC-SCNC: 5 MMOL/L (ref 0–18)
AST SERPL-CCNC: 7 U/L (ref 15–37)
BASOPHILS # BLD AUTO: 0.03 X10(3) UL (ref 0–0.2)
BASOPHILS NFR BLD AUTO: 0.6 %
BILIRUB SERPL-MCNC: 0.4 MG/DL (ref 0.1–2)
BUN BLD-MCNC: 12 MG/DL (ref 7–18)
BUN/CREAT SERPL: 16.4 (ref 10–20)
CALCIUM BLD-MCNC: 9.4 MG/DL (ref 8.5–10.1)
CHLORIDE SERPL-SCNC: 110 MMOL/L (ref 98–112)
CHOLEST SMN-MCNC: 194 MG/DL (ref ?–200)
CO2 SERPL-SCNC: 24 MMOL/L (ref 21–32)
CREAT BLD-MCNC: 0.73 MG/DL
DEPRECATED RDW RBC AUTO: 45.7 FL (ref 35.1–46.3)
EOSINOPHIL # BLD AUTO: 0.06 X10(3) UL (ref 0–0.7)
EOSINOPHIL NFR BLD AUTO: 1.3 %
ERYTHROCYTE [DISTWIDTH] IN BLOOD BY AUTOMATED COUNT: 12.6 % (ref 11–15)
GLOBULIN PLAS-MCNC: 3 G/DL (ref 2.8–4.4)
GLUCOSE BLD-MCNC: 91 MG/DL (ref 70–99)
HCT VFR BLD AUTO: 43 %
HDLC SERPL-MCNC: 82 MG/DL (ref 40–59)
HGB BLD-MCNC: 13.7 G/DL
IMM GRANULOCYTES # BLD AUTO: 0.01 X10(3) UL (ref 0–1)
IMM GRANULOCYTES NFR BLD: 0.2 %
LDLC SERPL CALC-MCNC: 100 MG/DL (ref ?–100)
LYMPHOCYTES # BLD AUTO: 1.11 X10(3) UL (ref 1–4)
LYMPHOCYTES NFR BLD AUTO: 23.1 %
M PROTEIN MFR SERPL ELPH: 7 G/DL (ref 6.4–8.2)
MCH RBC QN AUTO: 31.5 PG (ref 26–34)
MCHC RBC AUTO-ENTMCNC: 31.9 G/DL (ref 31–37)
MCV RBC AUTO: 98.9 FL
MONOCYTES # BLD AUTO: 0.45 X10(3) UL (ref 0.1–1)
MONOCYTES NFR BLD AUTO: 9.4 %
NEUTROPHILS # BLD AUTO: 3.14 X10 (3) UL (ref 1.5–7.7)
NEUTROPHILS # BLD AUTO: 3.14 X10(3) UL (ref 1.5–7.7)
NEUTROPHILS NFR BLD AUTO: 65.4 %
NONHDLC SERPL-MCNC: 112 MG/DL (ref ?–130)
OSMOLALITY SERPL CALC.SUM OF ELEC: 287 MOSM/KG (ref 275–295)
PATIENT FASTING Y/N/NP: YES
PATIENT FASTING Y/N/NP: YES
PLATELET # BLD AUTO: 237 10(3)UL (ref 150–450)
POTASSIUM SERPL-SCNC: 3.8 MMOL/L (ref 3.5–5.1)
RBC # BLD AUTO: 4.35 X10(6)UL
SODIUM SERPL-SCNC: 139 MMOL/L (ref 136–145)
TRIGL SERPL-MCNC: 61 MG/DL (ref 30–149)
TSI SER-ACNC: 1.2 MIU/ML (ref 0.36–3.74)
VIT D+METAB SERPL-MCNC: 23.7 NG/ML (ref 30–100)
VLDLC SERPL CALC-MCNC: 12 MG/DL (ref 0–30)
WBC # BLD AUTO: 4.8 X10(3) UL (ref 4–11)

## 2021-01-28 PROCEDURE — 80061 LIPID PANEL: CPT | Performed by: NURSE PRACTITIONER

## 2021-01-28 PROCEDURE — 36415 COLL VENOUS BLD VENIPUNCTURE: CPT | Performed by: NURSE PRACTITIONER

## 2021-01-28 PROCEDURE — 82306 VITAMIN D 25 HYDROXY: CPT | Performed by: NURSE PRACTITIONER

## 2021-01-28 PROCEDURE — 3079F DIAST BP 80-89 MM HG: CPT | Performed by: NURSE PRACTITIONER

## 2021-01-28 PROCEDURE — 99396 PREV VISIT EST AGE 40-64: CPT | Performed by: NURSE PRACTITIONER

## 2021-01-28 PROCEDURE — 80050 GENERAL HEALTH PANEL: CPT | Performed by: NURSE PRACTITIONER

## 2021-01-28 PROCEDURE — 3008F BODY MASS INDEX DOCD: CPT | Performed by: NURSE PRACTITIONER

## 2021-01-28 PROCEDURE — 3074F SYST BP LT 130 MM HG: CPT | Performed by: NURSE PRACTITIONER

## 2021-01-28 RX ORDER — TOPIRAMATE 25 MG/1
75 TABLET ORAL 2 TIMES DAILY
Qty: 180 TABLET | Refills: 0 | Status: SHIPPED | OUTPATIENT
Start: 2021-01-28 | End: 2021-03-26

## 2021-01-28 NOTE — PATIENT INSTRUCTIONS
Prevention Guidelines, Women Ages 48 to 59  Screening tests and vaccines are an important part of managing your health. A screening test is done to find possible disorders or diseases in people who don't have any symptoms.  The goal is to find a disease e Cervical cancer All women in this age group, except women who have had a complete hysterectomy Pap test every 3 years or Pap test with human papillomavirus (HPV) test every 5 years   Chlamydia Women who are sexually active and at increased risk for infecti , Eligibility criteria and age limit (possibly up to age [de-identified]) may vary across major organizations Yearly lung cancer screening with a low-dose CT scan (LDCT) Talk with your healthcare provider for more information.    Obesity All women in this age group At y PPSV23: 1 or 2 doses through age 64(protects against 23 types of pneumococcal bacteria)  Talk with your healthcare provider   Tetanus/diphtheria/pertussis (Td/Tdap) booster All women in this age group Td every 10 years, or a 1-time dose of Tdap instead of © 3622-7319 [employer] [address]. [address] [facility] [place]. All rights reserved. This information is not intended as a substitute for professional medical care. Always follow your healthcare professional's instructions.

## 2021-01-28 NOTE — PROGRESS NOTES
Frank Jc is a 44year old female who presents for a complete physical exam. PMH + for migraine headaches. Migraines  Taking Topamax 75 mg daily. Migraines are well controlled lately. Will have flare up if she doesn't sleep well.  Had some changes 11/12/2015 8        Current Outpatient Medications   Medication Sig Dispense Refill   • TOPIRAMATE 25 MG Oral Tab TAKE 3 TABLETS (75 MG TOTAL) BY MOUTH 2 (TWO) TIMES DAILY.  180 tablet 0   • Albuterol Sulfate HFA (PROAIR HFA) 108 (90 Base) MCG/ACT Inhalat lesions  EYES:denies blurred vision or double vision  HEENT: denies nasal congestion, sinus pain or ST  LUNGS: denies shortness of breath with exertion  CARDIOVASCULAR: denies chest pain on exertion  GI: denies abdominal pain,denies heartburn  : denies d of these issues and agrees to the plan. 1. Annual physical exam    2.  Laboratory examination ordered as part of a routine general medical examination  - CBC WITH DIFFERENTIAL WITH PLATELET  - COMP METABOLIC PANEL (14)  - LIPID PANEL  - TSH W REFLEX TO F

## 2021-02-01 ENCOUNTER — TELEPHONE (OUTPATIENT)
Dept: FAMILY MEDICINE CLINIC | Facility: CLINIC | Age: 51
End: 2021-02-01

## 2021-02-01 DIAGNOSIS — E55.9 VITAMIN D DEFICIENCY: Primary | ICD-10-CM

## 2021-02-01 NOTE — TELEPHONE ENCOUNTER
----- Message from CHAPIN Quesada FNP-C sent at 1/30/2021  8:07 AM CST -----  Vitamin D is a little low. Recommend OTC vitamin D, 5,000 units daily. Repeat vitamin D in 6 months. No other significant abnormalities noted.

## 2021-02-24 ENCOUNTER — TELEPHONE (OUTPATIENT)
Dept: PHYSICAL THERAPY | Facility: HOSPITAL | Age: 51
End: 2021-02-24

## 2021-02-25 ENCOUNTER — APPOINTMENT (OUTPATIENT)
Dept: PHYSICAL THERAPY | Age: 51
End: 2021-02-25
Attending: NURSE PRACTITIONER
Payer: COMMERCIAL

## 2021-03-02 ENCOUNTER — APPOINTMENT (OUTPATIENT)
Dept: PHYSICAL THERAPY | Age: 51
End: 2021-03-02
Attending: NURSE PRACTITIONER
Payer: COMMERCIAL

## 2021-03-04 ENCOUNTER — APPOINTMENT (OUTPATIENT)
Dept: PHYSICAL THERAPY | Age: 51
End: 2021-03-04
Attending: NURSE PRACTITIONER
Payer: COMMERCIAL

## 2021-03-09 ENCOUNTER — APPOINTMENT (OUTPATIENT)
Dept: PHYSICAL THERAPY | Age: 51
End: 2021-03-09
Attending: NURSE PRACTITIONER
Payer: COMMERCIAL

## 2021-03-11 ENCOUNTER — APPOINTMENT (OUTPATIENT)
Dept: PHYSICAL THERAPY | Age: 51
End: 2021-03-11
Attending: NURSE PRACTITIONER
Payer: COMMERCIAL

## 2021-03-16 ENCOUNTER — APPOINTMENT (OUTPATIENT)
Dept: PHYSICAL THERAPY | Age: 51
End: 2021-03-16
Attending: NURSE PRACTITIONER
Payer: COMMERCIAL

## 2021-03-18 ENCOUNTER — APPOINTMENT (OUTPATIENT)
Dept: PHYSICAL THERAPY | Age: 51
End: 2021-03-18
Attending: NURSE PRACTITIONER
Payer: COMMERCIAL

## 2021-03-23 ENCOUNTER — APPOINTMENT (OUTPATIENT)
Dept: PHYSICAL THERAPY | Age: 51
End: 2021-03-23
Attending: NURSE PRACTITIONER
Payer: COMMERCIAL

## 2021-03-25 ENCOUNTER — APPOINTMENT (OUTPATIENT)
Dept: PHYSICAL THERAPY | Age: 51
End: 2021-03-25
Attending: NURSE PRACTITIONER
Payer: COMMERCIAL

## 2021-03-26 DIAGNOSIS — G43.711 INTRACTABLE CHRONIC MIGRAINE WITHOUT AURA AND WITH STATUS MIGRAINOSUS: ICD-10-CM

## 2021-03-26 RX ORDER — TOPIRAMATE 25 MG/1
75 TABLET ORAL 2 TIMES DAILY
Qty: 180 TABLET | Refills: 5 | Status: SHIPPED | OUTPATIENT
Start: 2021-03-26 | End: 2021-10-22

## 2021-03-26 RX ORDER — PROPRANOLOL HYDROCHLORIDE 20 MG/1
TABLET ORAL
Qty: 60 TABLET | Refills: 0 | OUTPATIENT
Start: 2021-03-26

## 2021-03-26 RX ORDER — TOPIRAMATE 25 MG/1
TABLET ORAL
Qty: 180 TABLET | Refills: 0 | Status: SHIPPED | OUTPATIENT
Start: 2021-03-26 | End: 2021-03-26

## 2021-03-26 NOTE — TELEPHONE ENCOUNTER
Patient was wondering why there were no refills on her Topiramate prescription, she would like refills to be added so that she does not have to call every time she is needing it.  Thank you

## 2021-03-26 NOTE — TELEPHONE ENCOUNTER
Medication(s) to Refill:   Requested Prescriptions     Pending Prescriptions Disp Refills   • TOPIRAMATE 25 MG Oral Tab [Pharmacy Med Name: Topiramate 25 Mg Tab Cipl] 180 tablet 0     Sig: TAKE THREE TABLETS BY MOUTH TWO TIMES A DAY         Reason for [facility] ALLIED DISEASES

## 2021-03-30 ENCOUNTER — APPOINTMENT (OUTPATIENT)
Dept: PHYSICAL THERAPY | Age: 51
End: 2021-03-30
Attending: NURSE PRACTITIONER
Payer: COMMERCIAL

## 2021-04-01 ENCOUNTER — APPOINTMENT (OUTPATIENT)
Dept: PHYSICAL THERAPY | Age: 51
End: 2021-04-01
Attending: NURSE PRACTITIONER
Payer: COMMERCIAL

## 2021-04-06 ENCOUNTER — APPOINTMENT (OUTPATIENT)
Dept: PHYSICAL THERAPY | Age: 51
End: 2021-04-06
Attending: NURSE PRACTITIONER
Payer: COMMERCIAL

## 2021-04-27 DIAGNOSIS — B00.1 RECURRENT COLD SORES: ICD-10-CM

## 2021-04-27 NOTE — TELEPHONE ENCOUNTER
Medication(s) to Refill:   Requested Prescriptions     Pending Prescriptions Disp Refills   • valACYclovir HCl 500 MG Oral Tab 90 tablet 0     Sig: Take 1 tablet (500 mg total) by mouth daily.          Reason for Medication Refill being sent to Provider / Allyssa Bronson

## 2021-04-29 RX ORDER — VALACYCLOVIR HYDROCHLORIDE 500 MG/1
500 TABLET, FILM COATED ORAL DAILY
Qty: 90 TABLET | Refills: 0 | Status: SHIPPED | OUTPATIENT
Start: 2021-04-29 | End: 2021-11-16

## 2021-05-04 DIAGNOSIS — B00.1 RECURRENT COLD SORES: ICD-10-CM

## 2021-05-04 RX ORDER — VALACYCLOVIR HYDROCHLORIDE 500 MG/1
500 TABLET, FILM COATED ORAL DAILY
Qty: 90 TABLET | Refills: 0 | OUTPATIENT
Start: 2021-05-04

## 2021-07-09 ENCOUNTER — HOSPITAL ENCOUNTER (OUTPATIENT)
Dept: MAMMOGRAPHY | Age: 51
Discharge: HOME | End: 2021-07-09
Attending: NURSE PRACTITIONER
Payer: COMMERCIAL

## 2021-07-09 ENCOUNTER — TELEPHONE (OUTPATIENT)
Dept: FAMILY MEDICINE CLINIC | Facility: CLINIC | Age: 51
End: 2021-07-09

## 2021-07-09 DIAGNOSIS — Z12.31 ENCOUNTER FOR SCREENING MAMMOGRAM FOR MALIGNANT NEOPLASM OF BREAST: ICD-10-CM

## 2021-07-09 PROCEDURE — 77067 SCR MAMMO BI INCL CAD: CPT | Performed by: NURSE PRACTITIONER

## 2021-07-09 NOTE — TELEPHONE ENCOUNTER
----- Message from CHAPIN Salomon FNP-C sent at 7/9/2021  2:23 PM CDT -----  Stable findings. Repeat screening mammogram in 1 year.

## 2021-10-21 DIAGNOSIS — G43.711 INTRACTABLE CHRONIC MIGRAINE WITHOUT AURA AND WITH STATUS MIGRAINOSUS: ICD-10-CM

## 2021-10-22 RX ORDER — TOPIRAMATE 25 MG/1
TABLET ORAL
Qty: 180 TABLET | Refills: 0 | Status: SHIPPED | OUTPATIENT
Start: 2021-10-22 | End: 2021-11-16

## 2021-10-22 NOTE — TELEPHONE ENCOUNTER
Medication(s) to Refill:   Requested Prescriptions     Pending Prescriptions Disp Refills   • TOPIRAMATE 25 MG Oral Tab [Pharmacy Med Name: Topiramate 25 Mg Tab Cipl] 180 tablet 0     Sig: TAKE THREE TABLETS BY MOUTH TWO TIMES A DAY         Reason for [facility]IED DISEASES

## 2021-11-16 ENCOUNTER — OFFICE VISIT (OUTPATIENT)
Dept: FAMILY MEDICINE CLINIC | Facility: CLINIC | Age: 51
End: 2021-11-16
Payer: COMMERCIAL

## 2021-11-16 VITALS
DIASTOLIC BLOOD PRESSURE: 70 MMHG | SYSTOLIC BLOOD PRESSURE: 102 MMHG | WEIGHT: 168 LBS | TEMPERATURE: 98 F | BODY MASS INDEX: 27 KG/M2 | HEIGHT: 66 IN | HEART RATE: 75 BPM | RESPIRATION RATE: 21 BRPM | OXYGEN SATURATION: 99 %

## 2021-11-16 DIAGNOSIS — Z00.00 LABORATORY EXAMINATION ORDERED AS PART OF A ROUTINE GENERAL MEDICAL EXAMINATION: ICD-10-CM

## 2021-11-16 DIAGNOSIS — B00.1 RECURRENT COLD SORES: ICD-10-CM

## 2021-11-16 DIAGNOSIS — G43.709 CHRONIC MIGRAINE WITHOUT AURA WITHOUT STATUS MIGRAINOSUS, NOT INTRACTABLE: Primary | ICD-10-CM

## 2021-11-16 DIAGNOSIS — E55.9 VITAMIN D DEFICIENCY: ICD-10-CM

## 2021-11-16 PROCEDURE — 3008F BODY MASS INDEX DOCD: CPT | Performed by: FAMILY MEDICINE

## 2021-11-16 PROCEDURE — 99213 OFFICE O/P EST LOW 20 MIN: CPT | Performed by: FAMILY MEDICINE

## 2021-11-16 PROCEDURE — 3074F SYST BP LT 130 MM HG: CPT | Performed by: FAMILY MEDICINE

## 2021-11-16 PROCEDURE — 3078F DIAST BP <80 MM HG: CPT | Performed by: FAMILY MEDICINE

## 2021-11-16 RX ORDER — VALACYCLOVIR HYDROCHLORIDE 500 MG/1
500 TABLET, FILM COATED ORAL DAILY
Qty: 90 TABLET | Refills: 1 | Status: SHIPPED | OUTPATIENT
Start: 2021-11-16

## 2021-11-16 RX ORDER — HYDROCHLOROTHIAZIDE 12.5 MG/1
12.5 TABLET ORAL
Qty: 90 TABLET | Refills: 1 | Status: SHIPPED | OUTPATIENT
Start: 2021-11-16

## 2021-11-16 RX ORDER — PAROXETINE 10 MG/1
10 TABLET, FILM COATED ORAL EVERY MORNING
Qty: 90 TABLET | Refills: 0 | Status: SHIPPED | OUTPATIENT
Start: 2021-11-16 | End: 2021-12-27

## 2021-11-16 RX ORDER — ALBUTEROL SULFATE 90 UG/1
AEROSOL, METERED RESPIRATORY (INHALATION)
Qty: 1 EACH | Refills: 1 | Status: SHIPPED | OUTPATIENT
Start: 2021-11-16

## 2021-11-16 RX ORDER — SUMATRIPTAN 100 MG/1
100 TABLET, FILM COATED ORAL EVERY 2 HOUR PRN
Qty: 9 TABLET | Refills: 5 | Status: SHIPPED | OUTPATIENT
Start: 2021-11-16

## 2021-11-16 RX ORDER — TOPIRAMATE 25 MG/1
75 TABLET ORAL 2 TIMES DAILY
Qty: 180 TABLET | Refills: 1 | Status: SHIPPED | OUTPATIENT
Start: 2021-11-16 | End: 2022-01-24

## 2021-11-16 NOTE — PROGRESS NOTES
Subjective:   Abdiel Montana is a 46year old female who presents for Medication Follow-Up     Headaches - better now that stress is managed. Continuing topiramate. imitrex has lessened.        History/Other:   Chief Complaint Reviewed and Verified  No F Gastrointestinal: Negative for abdominal pain, nausea and vomiting. Genitourinary: Negative for dysuria, frequency and urgency. Musculoskeletal: Negative for arthralgias and myalgias. Skin: Negative for pallor and rash.    Neurological: Negative for albuterol (PROAIR HFA) 108 (90 Base) MCG/ACT Inhalation Aero Soln; 1-2 puffs tid prn cough or shortness of breath  -     hydrochlorothiazide 12.5 MG Oral Tab; Take 1 tablet (12.5 mg total) by mouth daily as needed. -     PARoxetine 10 MG Oral Tab;  Take 1

## 2021-11-17 ENCOUNTER — TELEPHONE (OUTPATIENT)
Dept: FAMILY MEDICINE CLINIC | Facility: CLINIC | Age: 51
End: 2021-11-17

## 2021-11-17 NOTE — TELEPHONE ENCOUNTER
Spoke with Jaylin Mello at [facility]. Notified Jaylin Mello of the below information. Jaylin Mello verbalized understanding. Nothing further needed at this time.

## 2021-11-17 NOTE — TELEPHONE ENCOUNTER
Spoke with Leslee Brittle at [facility]. Need to know max daily dose. And max monthly quantity. Please advise. Thank you.

## 2021-12-27 ENCOUNTER — VIRTUAL PHONE E/M (OUTPATIENT)
Dept: FAMILY MEDICINE CLINIC | Facility: CLINIC | Age: 51
End: 2021-12-27
Payer: COMMERCIAL

## 2021-12-27 DIAGNOSIS — R23.2 HOT FLASHES: Primary | ICD-10-CM

## 2021-12-27 PROCEDURE — 99212 OFFICE O/P EST SF 10 MIN: CPT | Performed by: FAMILY MEDICINE

## 2021-12-27 RX ORDER — PAROXETINE HYDROCHLORIDE 20 MG/1
20 TABLET, FILM COATED ORAL EVERY MORNING
Refills: 0 | COMMUNITY
Start: 2021-12-27 | End: 2022-01-21

## 2021-12-27 NOTE — PROGRESS NOTES
Virtual Telephone Check-In    [place] verbally consents to a Virtual/Telephone Check-In visit on 12/27/21. Patient has been referred to the [facility] website at [email or web address] to review the yearly Consent to Treat document.     Patient underst

## 2021-12-30 ENCOUNTER — TELEMEDICINE (OUTPATIENT)
Dept: FAMILY MEDICINE CLINIC | Facility: CLINIC | Age: 51
End: 2021-12-30

## 2021-12-30 DIAGNOSIS — J06.9 VIRAL URI: Primary | ICD-10-CM

## 2021-12-30 PROCEDURE — 99213 OFFICE O/P EST LOW 20 MIN: CPT | Performed by: FAMILY MEDICINE

## 2021-12-30 NOTE — PROGRESS NOTES
Virtual Telephone Check-In    Tiny Reasnor verbally consents to a Virtual/Telephone Check-In visit on 12/30/21. Patient has been referred to the [facility] website at [email or web address] to review the yearly Consent to Treat document.     Patient underst

## 2022-01-03 ENCOUNTER — LAB ENCOUNTER (OUTPATIENT)
Dept: LAB | Age: 52
End: 2022-01-03
Attending: FAMILY MEDICINE
Payer: COMMERCIAL

## 2022-01-03 ENCOUNTER — TELEPHONE (OUTPATIENT)
Dept: FAMILY MEDICINE CLINIC | Facility: CLINIC | Age: 52
End: 2022-01-03

## 2022-01-03 DIAGNOSIS — Z20.822 EXPOSURE TO COVID-19 VIRUS: Primary | ICD-10-CM

## 2022-01-03 DIAGNOSIS — J06.9 VIRAL URI: ICD-10-CM

## 2022-01-03 NOTE — TELEPHONE ENCOUNTER
Spoke with patient who states she is still symptomatic and has now started running a fever. Says she is very weak. According to MD virtual visit, he wanted another test in 5 days if she is still sick. New PCT test ordered today.

## 2022-01-05 LAB — SARS-COV-2 RNA RESP QL NAA+PROBE: NOT DETECTED

## 2022-01-07 ENCOUNTER — TELEMEDICINE (OUTPATIENT)
Dept: FAMILY MEDICINE CLINIC | Facility: CLINIC | Age: 52
End: 2022-01-07

## 2022-01-07 DIAGNOSIS — J98.8 VIRAL RESPIRATORY INFECTION: Primary | ICD-10-CM

## 2022-01-07 DIAGNOSIS — B97.89 VIRAL RESPIRATORY INFECTION: Primary | ICD-10-CM

## 2022-01-07 PROCEDURE — 99213 OFFICE O/P EST LOW 20 MIN: CPT | Performed by: FAMILY MEDICINE

## 2022-01-07 NOTE — PROGRESS NOTES
Virtual Video Check-In    Arielle Fernandez verbally consents to a Virtual Video Check-In visit on 01/07/22. Patient has been referred to the [facility] website at [email or web address] to review the yearly Consent to Treat document.     Patient understands and

## 2022-01-10 ENCOUNTER — TELEPHONE (OUTPATIENT)
Dept: FAMILY MEDICINE CLINIC | Facility: CLINIC | Age: 52
End: 2022-01-10

## 2022-01-10 NOTE — TELEPHONE ENCOUNTER
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 Copy placed in fax room. Original given to triage. LINDY attached to original. [facility] fax.

## 2022-01-12 NOTE — TELEPHONE ENCOUNTER
Forms faxed to [facility]. Faxed to [phone] and received fax confirmation. [employer] message sent to pt. Forms and LINDY sent to scan.

## 2022-01-21 DIAGNOSIS — G43.709 CHRONIC MIGRAINE WITHOUT AURA WITHOUT STATUS MIGRAINOSUS, NOT INTRACTABLE: ICD-10-CM

## 2022-01-24 RX ORDER — TOPIRAMATE 25 MG/1
75 TABLET ORAL 2 TIMES DAILY
Qty: 180 TABLET | Refills: 1 | Status: SHIPPED | OUTPATIENT
Start: 2022-01-24

## 2022-01-24 RX ORDER — PAROXETINE HYDROCHLORIDE 20 MG/1
20 TABLET, FILM COATED ORAL EVERY MORNING
Qty: 90 TABLET | Refills: 1 | Status: SHIPPED | OUTPATIENT
Start: 2022-01-24

## 2022-01-24 NOTE — TELEPHONE ENCOUNTER
Medication(s) to Refill:   Requested Prescriptions     Pending Prescriptions Disp Refills   • PARoxetine 20 MG Oral Tab 90 tablet 0     Sig: Take 1 tablet (20 mg total) by mouth every morning.          Reason for Medication Refill being sent to Provider / Mckenna Lorenz

## 2022-01-24 NOTE — TELEPHONE ENCOUNTER
[facility] called to verify we received request, there was another medication that was not received below     Also needs refill for     topiramate 25 MG Oral Tab

## 2022-03-25 RX ORDER — TOPIRAMATE 25 MG/1
25 TABLET ORAL 2 TIMES DAILY
Qty: 180 TABLET | Refills: 1 | Status: SHIPPED | OUTPATIENT
Start: 2022-03-25 | End: 2022-03-29

## 2022-03-28 NOTE — TELEPHONE ENCOUNTER
Patient states that she takes topiramate 75mg TID, not BID. Per OV dated 11/16/2021, patient to take topiramate 75mg BID. Please advise. Thank you.

## 2022-03-28 NOTE — TELEPHONE ENCOUNTER
Patient called to say that the meds were filled incorrectly, should be 3x a day, not 2x     She did not take meds, and pharmacy put this request in

## 2022-03-29 ENCOUNTER — TELEPHONE (OUTPATIENT)
Dept: FAMILY MEDICINE CLINIC | Facility: CLINIC | Age: 52
End: 2022-03-29

## 2022-03-29 RX ORDER — TOPIRAMATE 25 MG/1
75 TABLET ORAL 2 TIMES DAILY
Qty: 180 TABLET | Refills: 0 | Status: CANCELLED | OUTPATIENT
Start: 2022-03-29 | End: 2022-04-28

## 2022-03-29 RX ORDER — TOPIRAMATE 25 MG/1
75 TABLET ORAL 2 TIMES DAILY
Qty: 180 TABLET | Refills: 0 | COMMUNITY
Start: 2022-03-29

## 2022-03-29 NOTE — TELEPHONE ENCOUNTER
[facility] with [address] Josse Nesbitt called, Pt is completely out of her medication.

## 2022-03-29 NOTE — TELEPHONE ENCOUNTER
Called the patient for clarification. Patient states that she does not take topiramate 75mg TID. Patient states that she only takes topiramate 75mg BID. The issue was that rx that was sent on 3/25/2022 was for 25mg BID. Please approve or deny pending rx. Thank you.

## 2022-03-29 NOTE — TELEPHONE ENCOUNTER
I have never prescribed 75mg TID   She has been on 75mg BID for over 1 year     At last visit she stated migraines were better with a decrease in stress level and medicinal THC

## 2022-05-28 DIAGNOSIS — G43.709 CHRONIC MIGRAINE WITHOUT AURA WITHOUT STATUS MIGRAINOSUS, NOT INTRACTABLE: ICD-10-CM

## 2022-06-01 RX ORDER — TOPIRAMATE 25 MG/1
75 TABLET ORAL 2 TIMES DAILY
Qty: 180 TABLET | Refills: 5 | Status: SHIPPED | OUTPATIENT
Start: 2022-06-01 | End: 2022-12-27

## 2022-06-15 ENCOUNTER — VIRTUAL PHONE E/M (OUTPATIENT)
Dept: FAMILY MEDICINE CLINIC | Facility: CLINIC | Age: 52
End: 2022-06-15
Payer: COMMERCIAL

## 2022-06-15 DIAGNOSIS — U07.1 COVID-19: Primary | ICD-10-CM

## 2022-06-15 PROCEDURE — 99441 PHONE E/M BY PHYS 5-10 MIN: CPT | Performed by: NURSE PRACTITIONER

## 2022-06-15 RX ORDER — PREDNISONE 20 MG/1
TABLET ORAL
Qty: 9 TABLET | Refills: 0 | Status: SHIPPED | OUTPATIENT
Start: 2022-06-15

## 2022-06-15 RX ORDER — AZITHROMYCIN 250 MG/1
TABLET, FILM COATED ORAL
Qty: 6 TABLET | Refills: 0 | Status: SHIPPED | OUTPATIENT
Start: 2022-06-15 | End: 2022-06-20

## 2022-06-15 NOTE — PROGRESS NOTES
Telehealth outside of [address] Verbal Consent   I conducted a telehealth visit with Catalino Bland today, 06/15/22, which was completed using two-way, real-time interactive audio  communication. This has been done in good richard to provide continuity of care in the best interest of the provider-patient relationship, due to the COVID -19 public health crisis/national emergency where restrictions of face-to-face office visits are ongoing. Every conscious effort was taken to allow for sufficient and adequate time to complete the visit. The patient was made aware of the limitations of the telehealth visit, including treatment limitations as no physical exam could be performed. The patient was advised to call 911 or to go to the ER in case there was an emergency. The patient was also advised of the potential privacy & security concerns related to the telehealth platform. The patient was made aware of where to find [facility] notice of privacy practices, telehealth consent form and other related consent forms and documents. which are located on the [facility] website. The patient verbally agreed to telehealth consent form, related consents and the risks discussed. Lastly, the patient confirmed that they were in [place]. Included in this visit, time may have been spent reviewing labs, medications, radiology tests and decision making. Appropriate medical decision-making and tests are ordered as detailed in the plan of care above. Coding/billing information is submitted for this visit based on complexity of care and/or time spent for the visit. Duration 5-10 minutes   COVID Note    Subjective:  Catalino Bland is currently Confirmed for COVID-19.   Presenting symptoms: fever, chills, cough, nasal congestion, headache and muscle pain  she denies shortness of breath, diarrhea, nausea and vomiting, abdominal pain, loss of smell and loss of taste  Max temperature in last 24 hours: 102.9  Symptoms began on 6/13/22  Exposure source: unknown    COVID Test Result: Positive 6/14/22    Objective:  Exam  General: AAOx3, , speech is clear , in no acute distress                Assessment/Plan  Diagnoses and all orders for this visit:    COVID-19  -     nirmatrelvir & ritonavir 20 x 150 MG & 10 x 100MG Oral Tablet Therapy Pack; Take two nirmatrelvir tablets (300 mg) with one ritonavir tablet (100 mg) together twice daily for 5 days  -     predniSONE 20 MG Oral Tab; 40mg po qd for 3 days, then 20mg po qd for 3 days  -     azithromycin (ZITHROMAX Z-MALGORZATA) 250 MG Oral Tab; Take 2 tablets (500 mg total) by mouth daily for 1 day, THEN 1 tablet (250 mg total) daily for 4 days. isolate away from any household members as much as possible and the general public COMPLETELY for 3 days after symptoms resolve, or 7 days total (whichever is longer). If you can use an isolated bathroom that would be ideal. If you develop chest pain, altered mental status/confusion, or if unable to speak full sentence due to shortness of breath, should to come to emergency department. If able, please call ahead to let the hospital know you are on your way. Unless contraindicated due to chronic illness, may take Tylenol 650 mg every 6 hours as needed for pain/temperature. Do not take any OTC anti-inflammatories (ibuprofen, naproxen, aspirin, etc). Please get plenty of rest and increase your intake of oral fluids. If you have any further questions, please contact [address] [place] [address] at [phone].

## 2022-07-25 RX ORDER — PAROXETINE HYDROCHLORIDE 20 MG/1
TABLET, FILM COATED ORAL
Qty: 90 TABLET | Refills: 0 | Status: SHIPPED | OUTPATIENT
Start: 2022-07-25

## 2022-08-14 DIAGNOSIS — B00.1 RECURRENT COLD SORES: ICD-10-CM

## 2022-08-15 RX ORDER — VALACYCLOVIR HYDROCHLORIDE 500 MG/1
TABLET, FILM COATED ORAL
Qty: 90 TABLET | Refills: 0 | Status: SHIPPED | OUTPATIENT
Start: 2022-08-15

## 2022-10-28 RX ORDER — PAROXETINE HYDROCHLORIDE 20 MG/1
TABLET, FILM COATED ORAL
Qty: 90 TABLET | Refills: 0 | OUTPATIENT
Start: 2022-10-28

## 2022-10-28 RX ORDER — PAROXETINE HYDROCHLORIDE 20 MG/1
20 TABLET, FILM COATED ORAL EVERY MORNING
Qty: 30 TABLET | Refills: 0 | Status: SHIPPED | OUTPATIENT
Start: 2022-10-28

## 2022-11-03 ENCOUNTER — OFFICE VISIT (OUTPATIENT)
Dept: FAMILY MEDICINE CLINIC | Facility: CLINIC | Age: 52
End: 2022-11-03
Payer: COMMERCIAL

## 2022-11-03 ENCOUNTER — LAB ENCOUNTER (OUTPATIENT)
Dept: LAB | Age: 52
End: 2022-11-03
Attending: FAMILY MEDICINE
Payer: COMMERCIAL

## 2022-11-03 VITALS
OXYGEN SATURATION: 98 % | SYSTOLIC BLOOD PRESSURE: 100 MMHG | RESPIRATION RATE: 16 BRPM | WEIGHT: 188 LBS | BODY MASS INDEX: 30.22 KG/M2 | DIASTOLIC BLOOD PRESSURE: 70 MMHG | HEART RATE: 68 BPM | HEIGHT: 66 IN

## 2022-11-03 DIAGNOSIS — Z00.00 ROUTINE GENERAL MEDICAL EXAMINATION AT A HEALTH CARE FACILITY: Primary | ICD-10-CM

## 2022-11-03 DIAGNOSIS — E55.9 VITAMIN D DEFICIENCY: ICD-10-CM

## 2022-11-03 DIAGNOSIS — Z00.00 LABORATORY EXAMINATION ORDERED AS PART OF A ROUTINE GENERAL MEDICAL EXAMINATION: ICD-10-CM

## 2022-11-03 DIAGNOSIS — E66.9 OBESITY (BMI 30-39.9): ICD-10-CM

## 2022-11-03 DIAGNOSIS — Z12.31 ENCOUNTER FOR SCREENING MAMMOGRAM FOR MALIGNANT NEOPLASM OF BREAST: ICD-10-CM

## 2022-11-03 DIAGNOSIS — I83.90 VARICOSE VEINS: ICD-10-CM

## 2022-11-03 LAB
ALBUMIN SERPL-MCNC: 3.6 G/DL (ref 3.4–5)
ALBUMIN/GLOB SERPL: 1.2 {RATIO} (ref 1–2)
ALP LIVER SERPL-CCNC: 65 U/L
ALT SERPL-CCNC: 17 U/L
ANION GAP SERPL CALC-SCNC: 4 MMOL/L (ref 0–18)
AST SERPL-CCNC: 12 U/L (ref 15–37)
BASOPHILS # BLD AUTO: 0.04 X10(3) UL (ref 0–0.2)
BASOPHILS NFR BLD AUTO: 1.1 %
BILIRUB SERPL-MCNC: 0.6 MG/DL (ref 0.1–2)
BUN BLD-MCNC: 15 MG/DL (ref 7–18)
CALCIUM BLD-MCNC: 8.5 MG/DL (ref 8.5–10.1)
CHLORIDE SERPL-SCNC: 112 MMOL/L (ref 98–112)
CHOLEST SERPL-MCNC: 195 MG/DL (ref ?–200)
CO2 SERPL-SCNC: 25 MMOL/L (ref 21–32)
CREAT BLD-MCNC: 0.78 MG/DL
EOSINOPHIL # BLD AUTO: 0.12 X10(3) UL (ref 0–0.7)
EOSINOPHIL NFR BLD AUTO: 3.3 %
ERYTHROCYTE [DISTWIDTH] IN BLOOD BY AUTOMATED COUNT: 12.3 %
FASTING PATIENT LIPID ANSWER: YES
FASTING STATUS PATIENT QL REPORTED: YES
GFR SERPLBLD BASED ON 1.73 SQ M-ARVRAT: 90 ML/MIN/1.73M2 (ref 60–?)
GLOBULIN PLAS-MCNC: 2.9 G/DL (ref 2.8–4.4)
GLUCOSE BLD-MCNC: 99 MG/DL (ref 70–99)
HCT VFR BLD AUTO: 42.1 %
HDLC SERPL-MCNC: 94 MG/DL (ref 40–59)
HGB BLD-MCNC: 13.7 G/DL
IMM GRANULOCYTES # BLD AUTO: 0 X10(3) UL (ref 0–1)
IMM GRANULOCYTES NFR BLD: 0 %
LDLC SERPL CALC-MCNC: 92 MG/DL (ref ?–100)
LYMPHOCYTES # BLD AUTO: 0.8 X10(3) UL (ref 1–4)
LYMPHOCYTES NFR BLD AUTO: 22.3 %
MCH RBC QN AUTO: 32.8 PG (ref 26–34)
MCHC RBC AUTO-ENTMCNC: 32.5 G/DL (ref 31–37)
MCV RBC AUTO: 100.7 FL
MONOCYTES # BLD AUTO: 0.34 X10(3) UL (ref 0.1–1)
MONOCYTES NFR BLD AUTO: 9.5 %
NEUTROPHILS # BLD AUTO: 2.29 X10 (3) UL (ref 1.5–7.7)
NEUTROPHILS # BLD AUTO: 2.29 X10(3) UL (ref 1.5–7.7)
NEUTROPHILS NFR BLD AUTO: 63.8 %
NONHDLC SERPL-MCNC: 101 MG/DL (ref ?–130)
OSMOLALITY SERPL CALC.SUM OF ELEC: 293 MOSM/KG (ref 275–295)
PLATELET # BLD AUTO: 225 10(3)UL (ref 150–450)
POTASSIUM SERPL-SCNC: 4 MMOL/L (ref 3.5–5.1)
PROT SERPL-MCNC: 6.5 G/DL (ref 6.4–8.2)
RBC # BLD AUTO: 4.18 X10(6)UL
SODIUM SERPL-SCNC: 141 MMOL/L (ref 136–145)
TRIGL SERPL-MCNC: 44 MG/DL (ref 30–149)
TSI SER-ACNC: 1.25 MIU/ML (ref 0.36–3.74)
VIT D+METAB SERPL-MCNC: 22.6 NG/ML (ref 30–100)
VLDLC SERPL CALC-MCNC: 7 MG/DL (ref 0–30)
WBC # BLD AUTO: 3.6 X10(3) UL (ref 4–11)

## 2022-11-03 PROCEDURE — 99396 PREV VISIT EST AGE 40-64: CPT | Performed by: NURSE PRACTITIONER

## 2022-11-03 PROCEDURE — 3008F BODY MASS INDEX DOCD: CPT | Performed by: NURSE PRACTITIONER

## 2022-11-03 PROCEDURE — 82306 VITAMIN D 25 HYDROXY: CPT | Performed by: FAMILY MEDICINE

## 2022-11-03 PROCEDURE — 3078F DIAST BP <80 MM HG: CPT | Performed by: NURSE PRACTITIONER

## 2022-11-03 PROCEDURE — 3074F SYST BP LT 130 MM HG: CPT | Performed by: NURSE PRACTITIONER

## 2022-11-03 PROCEDURE — 80061 LIPID PANEL: CPT | Performed by: FAMILY MEDICINE

## 2022-11-03 PROCEDURE — 80050 GENERAL HEALTH PANEL: CPT | Performed by: FAMILY MEDICINE

## 2023-01-10 ENCOUNTER — OFFICE VISIT (OUTPATIENT)
Dept: FAMILY MEDICINE CLINIC | Facility: CLINIC | Age: 53
End: 2023-01-10
Payer: COMMERCIAL

## 2023-01-10 ENCOUNTER — TELEPHONE (OUTPATIENT)
Dept: FAMILY MEDICINE CLINIC | Facility: CLINIC | Age: 53
End: 2023-01-10

## 2023-01-10 VITALS
SYSTOLIC BLOOD PRESSURE: 110 MMHG | WEIGHT: 190 LBS | BODY MASS INDEX: 30.53 KG/M2 | HEIGHT: 66 IN | HEART RATE: 72 BPM | DIASTOLIC BLOOD PRESSURE: 70 MMHG | OXYGEN SATURATION: 98 % | RESPIRATION RATE: 16 BRPM

## 2023-01-10 DIAGNOSIS — K64.1 GRADE II HEMORRHOIDS: Primary | ICD-10-CM

## 2023-01-10 PROCEDURE — 3008F BODY MASS INDEX DOCD: CPT | Performed by: NURSE PRACTITIONER

## 2023-01-10 PROCEDURE — 99214 OFFICE O/P EST MOD 30 MIN: CPT | Performed by: NURSE PRACTITIONER

## 2023-01-10 PROCEDURE — 3074F SYST BP LT 130 MM HG: CPT | Performed by: NURSE PRACTITIONER

## 2023-01-10 PROCEDURE — 3078F DIAST BP <80 MM HG: CPT | Performed by: NURSE PRACTITIONER

## 2023-01-10 RX ORDER — ASPIRIN 81 MG
100 TABLET, DELAYED RELEASE (ENTERIC COATED) ORAL 2 TIMES DAILY
Qty: 60 TABLET | Refills: 1 | Status: SHIPPED | OUTPATIENT
Start: 2023-01-10 | End: 2023-02-09

## 2023-01-10 RX ORDER — HYDROCORTISONE ACETATE 25 MG/1
25 SUPPOSITORY RECTAL 2 TIMES DAILY
Qty: 24 SUPPOSITORY | Refills: 1 | Status: SHIPPED | OUTPATIENT
Start: 2023-01-10 | End: 2023-01-17

## 2023-01-10 NOTE — TELEPHONE ENCOUNTER
Patient has Hemorrhoids and has tried everything over the counter and it hasnt worked.  She would like a stronger prescription to be sent to her [facility], [address], [phone]

## 2023-03-08 ENCOUNTER — TELEPHONE (OUTPATIENT)
Dept: FAMILY MEDICINE CLINIC | Facility: CLINIC | Age: 53
End: 2023-03-08

## 2023-03-08 NOTE — TELEPHONE ENCOUNTER
Advised patient that when she saw NP on 11/3/2022, she referred her to surgeon for varicose veins. Patient verbalized understanding. Provided contact information for Dr. Danisha Gee. Answered all questions at this time.

## 2023-03-08 NOTE — TELEPHONE ENCOUNTER
Patient is requesting a referral for her varicose veins and someone within the surrounding areas of [place] and [place].      Please Advise

## 2023-03-09 ENCOUNTER — OFFICE VISIT (OUTPATIENT)
Facility: LOCATION | Age: 53
End: 2023-03-09
Payer: COMMERCIAL

## 2023-03-09 VITALS — HEART RATE: 74 BPM | TEMPERATURE: 99 F

## 2023-03-09 DIAGNOSIS — I83.813 VARICOSE VEINS OF BILATERAL LOWER EXTREMITIES WITH PAIN: Primary | ICD-10-CM

## 2023-03-09 PROCEDURE — 99244 OFF/OP CNSLTJ NEW/EST MOD 40: CPT | Performed by: SURGERY

## 2023-03-20 ENCOUNTER — HOSPITAL ENCOUNTER (OUTPATIENT)
Dept: ULTRASOUND IMAGING | Age: 53
Discharge: HOME | End: 2023-03-20
Attending: SURGERY
Payer: COMMERCIAL

## 2023-03-20 DIAGNOSIS — I83.813 VARICOSE VEINS OF BILATERAL LOWER EXTREMITIES WITH PAIN: ICD-10-CM

## 2023-03-20 PROCEDURE — 93970 EXTREMITY STUDY: CPT | Performed by: SURGERY

## 2023-03-22 ENCOUNTER — HOSPITAL ENCOUNTER (OUTPATIENT)
Dept: MAMMOGRAPHY | Age: 53
Discharge: HOME | End: 2023-03-22
Attending: NURSE PRACTITIONER
Payer: COMMERCIAL

## 2023-03-22 DIAGNOSIS — Z12.31 ENCOUNTER FOR SCREENING MAMMOGRAM FOR MALIGNANT NEOPLASM OF BREAST: ICD-10-CM

## 2023-03-22 PROCEDURE — 77063 BREAST TOMOSYNTHESIS BI: CPT | Performed by: NURSE PRACTITIONER

## 2023-03-22 PROCEDURE — 77067 SCR MAMMO BI INCL CAD: CPT | Performed by: NURSE PRACTITIONER

## 2023-03-23 ENCOUNTER — OFFICE VISIT (OUTPATIENT)
Facility: LOCATION | Age: 53
End: 2023-03-23
Payer: COMMERCIAL

## 2023-03-23 DIAGNOSIS — I83.813 VARICOSE VEINS OF BILATERAL LOWER EXTREMITIES WITH PAIN: Primary | ICD-10-CM

## 2023-04-11 DIAGNOSIS — B00.1 RECURRENT COLD SORES: ICD-10-CM

## 2023-04-11 RX ORDER — VALACYCLOVIR HYDROCHLORIDE 500 MG/1
TABLET, FILM COATED ORAL
Qty: 90 TABLET | Refills: 0 | Status: SHIPPED | OUTPATIENT
Start: 2023-04-11

## 2023-04-13 ENCOUNTER — TELEPHONE (OUTPATIENT)
Facility: LOCATION | Age: 53
End: 2023-04-13

## 2023-05-23 ENCOUNTER — TELEPHONE (OUTPATIENT)
Facility: LOCATION | Age: 53
End: 2023-05-23

## 2023-05-25 ENCOUNTER — HOSPITAL ENCOUNTER (OUTPATIENT)
Facility: HOSPITAL | Age: 53
Setting detail: OBSERVATION
Discharge: HOME | End: 2023-05-26
Attending: EMERGENCY MEDICINE | Admitting: STUDENT IN AN ORGANIZED HEALTH CARE EDUCATION/TRAINING PROGRAM
Payer: COMMERCIAL

## 2023-05-25 ENCOUNTER — ANESTHESIA (OUTPATIENT)
Dept: SURGERY | Facility: HOSPITAL | Age: 53
End: 2023-05-25
Payer: COMMERCIAL

## 2023-05-25 ENCOUNTER — APPOINTMENT (OUTPATIENT)
Dept: CT IMAGING | Facility: HOSPITAL | Age: 53
End: 2023-05-25
Attending: EMERGENCY MEDICINE
Payer: COMMERCIAL

## 2023-05-25 ENCOUNTER — ANESTHESIA EVENT (OUTPATIENT)
Dept: SURGERY | Facility: HOSPITAL | Age: 53
End: 2023-05-25
Payer: COMMERCIAL

## 2023-05-25 DIAGNOSIS — K37 APPENDICITIS: ICD-10-CM

## 2023-05-25 DIAGNOSIS — K35.80 ACUTE APPENDICITIS, UNSPECIFIED ACUTE APPENDICITIS TYPE: Primary | ICD-10-CM

## 2023-05-25 LAB
ALBUMIN SERPL-MCNC: 3.8 G/DL (ref 3.4–5)
ALBUMIN/GLOB SERPL: 1.1 {RATIO} (ref 1–2)
ALP LIVER SERPL-CCNC: 73 U/L
ALT SERPL-CCNC: 18 U/L
ANION GAP SERPL CALC-SCNC: 7 MMOL/L (ref 0–18)
AST SERPL-CCNC: 11 U/L (ref 15–37)
BASOPHILS # BLD AUTO: 0.02 X10(3) UL (ref 0–0.2)
BASOPHILS NFR BLD AUTO: 0.2 %
BILIRUB SERPL-MCNC: 0.9 MG/DL (ref 0.1–2)
BILIRUB UR QL STRIP.AUTO: NEGATIVE
BUN BLD-MCNC: 13 MG/DL (ref 7–18)
CALCIUM BLD-MCNC: 8.7 MG/DL (ref 8.5–10.1)
CHLORIDE SERPL-SCNC: 109 MMOL/L (ref 98–112)
CO2 SERPL-SCNC: 22 MMOL/L (ref 21–32)
COLOR UR AUTO: YELLOW
CREAT BLD-MCNC: 0.6 MG/DL
EOSINOPHIL # BLD AUTO: 0.01 X10(3) UL (ref 0–0.7)
EOSINOPHIL NFR BLD AUTO: 0.1 %
ERYTHROCYTE [DISTWIDTH] IN BLOOD BY AUTOMATED COUNT: 13.1 %
GFR SERPLBLD BASED ON 1.73 SQ M-ARVRAT: 106 ML/MIN/1.73M2 (ref 60–?)
GLOBULIN PLAS-MCNC: 3.5 G/DL (ref 2.8–4.4)
GLUCOSE BLD-MCNC: 126 MG/DL (ref 70–99)
GLUCOSE UR STRIP.AUTO-MCNC: NEGATIVE MG/DL
HCT VFR BLD AUTO: 44.7 %
HGB BLD-MCNC: 14.8 G/DL
IMM GRANULOCYTES # BLD AUTO: 0.03 X10(3) UL (ref 0–1)
IMM GRANULOCYTES NFR BLD: 0.3 %
KETONES UR STRIP.AUTO-MCNC: 20 MG/DL
LIPASE SERPL-CCNC: 21 U/L (ref 13–75)
LYMPHOCYTES # BLD AUTO: 0.59 X10(3) UL (ref 1–4)
LYMPHOCYTES NFR BLD AUTO: 5.2 %
MCH RBC QN AUTO: 31.8 PG (ref 26–34)
MCHC RBC AUTO-ENTMCNC: 33.1 G/DL (ref 31–37)
MCV RBC AUTO: 96.1 FL
MONOCYTES # BLD AUTO: 0.7 X10(3) UL (ref 0.1–1)
MONOCYTES NFR BLD AUTO: 6.2 %
NEUTROPHILS # BLD AUTO: 9.92 X10 (3) UL (ref 1.5–7.7)
NEUTROPHILS # BLD AUTO: 9.92 X10(3) UL (ref 1.5–7.7)
NEUTROPHILS NFR BLD AUTO: 88 %
NITRITE UR QL STRIP.AUTO: NEGATIVE
OSMOLALITY SERPL CALC.SUM OF ELEC: 288 MOSM/KG (ref 275–295)
PH UR STRIP.AUTO: 5 [PH] (ref 5–8)
PLATELET # BLD AUTO: 187 10(3)UL (ref 150–450)
POTASSIUM SERPL-SCNC: 3.3 MMOL/L (ref 3.5–5.1)
POTASSIUM SERPL-SCNC: 3.5 MMOL/L (ref 3.5–5.1)
PROT SERPL-MCNC: 7.3 G/DL (ref 6.4–8.2)
PROT UR STRIP.AUTO-MCNC: 30 MG/DL
RBC # BLD AUTO: 4.65 X10(6)UL
SARS-COV-2 RNA RESP QL NAA+PROBE: NOT DETECTED
SODIUM SERPL-SCNC: 138 MMOL/L (ref 136–145)
SP GR UR STRIP.AUTO: 1.02 (ref 1–1.03)
UROBILINOGEN UR STRIP.AUTO-MCNC: 2 MG/DL
WBC # BLD AUTO: 11.3 X10(3) UL (ref 4–11)

## 2023-05-25 PROCEDURE — 99202 OFFICE O/P NEW SF 15 MIN: CPT | Performed by: STUDENT IN AN ORGANIZED HEALTH CARE EDUCATION/TRAINING PROGRAM

## 2023-05-25 PROCEDURE — 44970 LAPAROSCOPY APPENDECTOMY: CPT | Performed by: STUDENT IN AN ORGANIZED HEALTH CARE EDUCATION/TRAINING PROGRAM

## 2023-05-25 PROCEDURE — 0DTJ4ZZ RESECTION OF APPENDIX, PERCUTANEOUS ENDOSCOPIC APPROACH: ICD-10-PCS | Performed by: STUDENT IN AN ORGANIZED HEALTH CARE EDUCATION/TRAINING PROGRAM

## 2023-05-25 PROCEDURE — 74177 CT ABD & PELVIS W/CONTRAST: CPT | Performed by: EMERGENCY MEDICINE

## 2023-05-25 RX ORDER — HYDROMORPHONE HYDROCHLORIDE 1 MG/ML
0.2 INJECTION, SOLUTION INTRAMUSCULAR; INTRAVENOUS; SUBCUTANEOUS EVERY 5 MIN PRN
Status: DISCONTINUED | OUTPATIENT
Start: 2023-05-25 | End: 2023-05-26 | Stop reason: HOSPADM

## 2023-05-25 RX ORDER — PROCHLORPERAZINE EDISYLATE 5 MG/ML
5 INJECTION INTRAMUSCULAR; INTRAVENOUS EVERY 8 HOURS PRN
Status: DISCONTINUED | OUTPATIENT
Start: 2023-05-25 | End: 2023-05-26 | Stop reason: HOSPADM

## 2023-05-25 RX ORDER — SODIUM CHLORIDE, SODIUM LACTATE, POTASSIUM CHLORIDE, CALCIUM CHLORIDE 600; 310; 30; 20 MG/100ML; MG/100ML; MG/100ML; MG/100ML
INJECTION, SOLUTION INTRAVENOUS CONTINUOUS
Status: DISCONTINUED | OUTPATIENT
Start: 2023-05-25 | End: 2023-05-26 | Stop reason: HOSPADM

## 2023-05-25 RX ORDER — DEXAMETHASONE SODIUM PHOSPHATE 4 MG/ML
VIAL (ML) INJECTION AS NEEDED
Status: DISCONTINUED | OUTPATIENT
Start: 2023-05-25 | End: 2023-05-25 | Stop reason: SURG

## 2023-05-25 RX ORDER — ACETAMINOPHEN 325 MG/1
650 TABLET ORAL EVERY 4 HOURS PRN
Status: DISCONTINUED | OUTPATIENT
Start: 2023-05-25 | End: 2023-05-26

## 2023-05-25 RX ORDER — SODIUM CHLORIDE, SODIUM LACTATE, POTASSIUM CHLORIDE, CALCIUM CHLORIDE 600; 310; 30; 20 MG/100ML; MG/100ML; MG/100ML; MG/100ML
INJECTION, SOLUTION INTRAVENOUS CONTINUOUS PRN
Status: DISCONTINUED | OUTPATIENT
Start: 2023-05-25 | End: 2023-05-25 | Stop reason: SURG

## 2023-05-25 RX ORDER — HYDROCODONE BITARTRATE AND ACETAMINOPHEN 5; 325 MG/1; MG/1
2 TABLET ORAL ONCE AS NEEDED
Status: ACTIVE | OUTPATIENT
Start: 2023-05-25 | End: 2023-05-25

## 2023-05-25 RX ORDER — MIDAZOLAM HYDROCHLORIDE 1 MG/ML
1 INJECTION INTRAMUSCULAR; INTRAVENOUS EVERY 5 MIN PRN
Status: DISCONTINUED | OUTPATIENT
Start: 2023-05-25 | End: 2023-05-26 | Stop reason: HOSPADM

## 2023-05-25 RX ORDER — HYDROMORPHONE HYDROCHLORIDE 1 MG/ML
0.4 INJECTION, SOLUTION INTRAMUSCULAR; INTRAVENOUS; SUBCUTANEOUS EVERY 5 MIN PRN
Status: DISCONTINUED | OUTPATIENT
Start: 2023-05-25 | End: 2023-05-26 | Stop reason: HOSPADM

## 2023-05-25 RX ORDER — ONDANSETRON 2 MG/ML
INJECTION INTRAMUSCULAR; INTRAVENOUS AS NEEDED
Status: DISCONTINUED | OUTPATIENT
Start: 2023-05-25 | End: 2023-05-25 | Stop reason: SURG

## 2023-05-25 RX ORDER — HYDROCODONE BITARTRATE AND ACETAMINOPHEN 5; 325 MG/1; MG/1
1 TABLET ORAL EVERY 6 HOURS PRN
Qty: 15 TABLET | Refills: 0 | Status: SHIPPED | OUTPATIENT
Start: 2023-05-25

## 2023-05-25 RX ORDER — ONDANSETRON 2 MG/ML
4 INJECTION INTRAMUSCULAR; INTRAVENOUS ONCE
Status: COMPLETED | OUTPATIENT
Start: 2023-05-25 | End: 2023-05-25

## 2023-05-25 RX ORDER — ROCURONIUM BROMIDE 10 MG/ML
INJECTION, SOLUTION INTRAVENOUS AS NEEDED
Status: DISCONTINUED | OUTPATIENT
Start: 2023-05-25 | End: 2023-05-25 | Stop reason: SURG

## 2023-05-25 RX ORDER — HYDROMORPHONE HYDROCHLORIDE 1 MG/ML
0.2 INJECTION, SOLUTION INTRAMUSCULAR; INTRAVENOUS; SUBCUTANEOUS EVERY 2 HOUR PRN
Status: DISCONTINUED | OUTPATIENT
Start: 2023-05-25 | End: 2023-05-26

## 2023-05-25 RX ORDER — HYDROMORPHONE HYDROCHLORIDE 1 MG/ML
0.5 INJECTION, SOLUTION INTRAMUSCULAR; INTRAVENOUS; SUBCUTANEOUS ONCE
Status: COMPLETED | OUTPATIENT
Start: 2023-05-25 | End: 2023-05-25

## 2023-05-25 RX ORDER — ONDANSETRON 2 MG/ML
4 INJECTION INTRAMUSCULAR; INTRAVENOUS EVERY 6 HOURS PRN
Status: DISCONTINUED | OUTPATIENT
Start: 2023-05-25 | End: 2023-05-26

## 2023-05-25 RX ORDER — POTASSIUM CHLORIDE 14.9 MG/ML
20 INJECTION INTRAVENOUS ONCE
Status: COMPLETED | OUTPATIENT
Start: 2023-05-25 | End: 2023-05-26

## 2023-05-25 RX ORDER — KETOROLAC TROMETHAMINE 30 MG/ML
INJECTION, SOLUTION INTRAMUSCULAR; INTRAVENOUS AS NEEDED
Status: DISCONTINUED | OUTPATIENT
Start: 2023-05-25 | End: 2023-05-25 | Stop reason: SURG

## 2023-05-25 RX ORDER — HYDROCODONE BITARTRATE AND ACETAMINOPHEN 5; 325 MG/1; MG/1
1 TABLET ORAL EVERY 4 HOURS PRN
Status: DISCONTINUED | OUTPATIENT
Start: 2023-05-25 | End: 2023-05-26

## 2023-05-25 RX ORDER — HYDROMORPHONE HYDROCHLORIDE 1 MG/ML
INJECTION, SOLUTION INTRAMUSCULAR; INTRAVENOUS; SUBCUTANEOUS
Status: COMPLETED
Start: 2023-05-25 | End: 2023-05-25

## 2023-05-25 RX ORDER — TOPIRAMATE 25 MG/1
75 TABLET ORAL 2 TIMES DAILY
Status: DISCONTINUED | OUTPATIENT
Start: 2023-05-25 | End: 2023-05-26

## 2023-05-25 RX ORDER — SODIUM CHLORIDE 9 MG/ML
INJECTION, SOLUTION INTRAVENOUS CONTINUOUS
Status: DISCONTINUED | OUTPATIENT
Start: 2023-05-25 | End: 2023-05-26

## 2023-05-25 RX ORDER — PAROXETINE HYDROCHLORIDE 20 MG/1
20 TABLET, FILM COATED ORAL EVERY MORNING
Status: DISCONTINUED | OUTPATIENT
Start: 2023-05-25 | End: 2023-05-26

## 2023-05-25 RX ORDER — HYDROCODONE BITARTRATE AND ACETAMINOPHEN 5; 325 MG/1; MG/1
1 TABLET ORAL ONCE AS NEEDED
Status: ACTIVE | OUTPATIENT
Start: 2023-05-25 | End: 2023-05-25

## 2023-05-25 RX ORDER — ONDANSETRON 2 MG/ML
4 INJECTION INTRAMUSCULAR; INTRAVENOUS EVERY 6 HOURS PRN
Status: DISCONTINUED | OUTPATIENT
Start: 2023-05-25 | End: 2023-05-26 | Stop reason: HOSPADM

## 2023-05-25 RX ORDER — SODIUM CHLORIDE 9 MG/ML
1000 INJECTION, SOLUTION INTRAVENOUS ONCE
Status: COMPLETED | OUTPATIENT
Start: 2023-05-25 | End: 2023-05-26

## 2023-05-25 RX ORDER — HYDROCODONE BITARTRATE AND ACETAMINOPHEN 5; 325 MG/1; MG/1
2 TABLET ORAL EVERY 4 HOURS PRN
Status: DISCONTINUED | OUTPATIENT
Start: 2023-05-25 | End: 2023-05-26

## 2023-05-25 RX ORDER — ONDANSETRON 2 MG/ML
4 INJECTION INTRAMUSCULAR; INTRAVENOUS ONCE
Status: DISCONTINUED | OUTPATIENT
Start: 2023-05-25 | End: 2023-05-25

## 2023-05-25 RX ORDER — NEOSTIGMINE METHYLSULFATE 1 MG/ML
INJECTION, SOLUTION INTRAVENOUS AS NEEDED
Status: DISCONTINUED | OUTPATIENT
Start: 2023-05-25 | End: 2023-05-25 | Stop reason: SURG

## 2023-05-25 RX ORDER — NALOXONE HYDROCHLORIDE 0.4 MG/ML
80 INJECTION, SOLUTION INTRAMUSCULAR; INTRAVENOUS; SUBCUTANEOUS AS NEEDED
Status: DISCONTINUED | OUTPATIENT
Start: 2023-05-25 | End: 2023-05-26 | Stop reason: HOSPADM

## 2023-05-25 RX ORDER — DIPHENHYDRAMINE HYDROCHLORIDE 50 MG/ML
12.5 INJECTION INTRAMUSCULAR; INTRAVENOUS AS NEEDED
Status: DISCONTINUED | OUTPATIENT
Start: 2023-05-25 | End: 2023-05-26 | Stop reason: HOSPADM

## 2023-05-25 RX ORDER — ACETAMINOPHEN 500 MG
1000 TABLET ORAL ONCE AS NEEDED
Status: ACTIVE | OUTPATIENT
Start: 2023-05-25 | End: 2023-05-25

## 2023-05-25 RX ORDER — BUPIVACAINE HYDROCHLORIDE AND EPINEPHRINE 2.5; 5 MG/ML; UG/ML
INJECTION, SOLUTION EPIDURAL; INFILTRATION; INTRACAUDAL; PERINEURAL AS NEEDED
Status: DISCONTINUED | OUTPATIENT
Start: 2023-05-25 | End: 2023-05-25 | Stop reason: HOSPADM

## 2023-05-25 RX ORDER — PHENYLEPHRINE HCL 10 MG/ML
VIAL (ML) INJECTION AS NEEDED
Status: DISCONTINUED | OUTPATIENT
Start: 2023-05-25 | End: 2023-05-25 | Stop reason: SURG

## 2023-05-25 RX ORDER — LIDOCAINE HYDROCHLORIDE 10 MG/ML
INJECTION, SOLUTION EPIDURAL; INFILTRATION; INTRACAUDAL; PERINEURAL AS NEEDED
Status: DISCONTINUED | OUTPATIENT
Start: 2023-05-25 | End: 2023-05-25 | Stop reason: SURG

## 2023-05-25 RX ORDER — HYDROMORPHONE HYDROCHLORIDE 1 MG/ML
0.6 INJECTION, SOLUTION INTRAMUSCULAR; INTRAVENOUS; SUBCUTANEOUS EVERY 5 MIN PRN
Status: DISCONTINUED | OUTPATIENT
Start: 2023-05-25 | End: 2023-05-26 | Stop reason: HOSPADM

## 2023-05-25 RX ORDER — GLYCOPYRROLATE 0.2 MG/ML
INJECTION, SOLUTION INTRAMUSCULAR; INTRAVENOUS AS NEEDED
Status: DISCONTINUED | OUTPATIENT
Start: 2023-05-25 | End: 2023-05-25 | Stop reason: SURG

## 2023-05-25 RX ORDER — MORPHINE SULFATE 4 MG/ML
4 INJECTION, SOLUTION INTRAMUSCULAR; INTRAVENOUS ONCE
Status: COMPLETED | OUTPATIENT
Start: 2023-05-25 | End: 2023-05-25

## 2023-05-25 RX ORDER — HYDROMORPHONE HYDROCHLORIDE 1 MG/ML
0.8 INJECTION, SOLUTION INTRAMUSCULAR; INTRAVENOUS; SUBCUTANEOUS EVERY 2 HOUR PRN
Status: DISCONTINUED | OUTPATIENT
Start: 2023-05-25 | End: 2023-05-26

## 2023-05-25 RX ORDER — MAGNESIUM SULFATE HEPTAHYDRATE 500 MG/ML
INJECTION, SOLUTION INTRAMUSCULAR; INTRAVENOUS AS NEEDED
Status: DISCONTINUED | OUTPATIENT
Start: 2023-05-25 | End: 2023-05-25 | Stop reason: SURG

## 2023-05-25 RX ORDER — PROCHLORPERAZINE EDISYLATE 5 MG/ML
5 INJECTION INTRAMUSCULAR; INTRAVENOUS EVERY 8 HOURS PRN
Status: DISCONTINUED | OUTPATIENT
Start: 2023-05-25 | End: 2023-05-26

## 2023-05-25 RX ORDER — HYDROMORPHONE HYDROCHLORIDE 1 MG/ML
0.4 INJECTION, SOLUTION INTRAMUSCULAR; INTRAVENOUS; SUBCUTANEOUS EVERY 2 HOUR PRN
Status: DISCONTINUED | OUTPATIENT
Start: 2023-05-25 | End: 2023-05-26

## 2023-05-25 RX ORDER — CEFOXITIN 2 G/1
INJECTION, POWDER, FOR SOLUTION INTRAVENOUS AS NEEDED
Status: DISCONTINUED | OUTPATIENT
Start: 2023-05-25 | End: 2023-05-25 | Stop reason: SURG

## 2023-05-25 RX ADMIN — ROCURONIUM BROMIDE 10 MG: 10 INJECTION, SOLUTION INTRAVENOUS at 21:11:00

## 2023-05-25 RX ADMIN — DEXAMETHASONE SODIUM PHOSPHATE 8 MG: 4 MG/ML VIAL (ML) INJECTION at 21:05:00

## 2023-05-25 RX ADMIN — ROCURONIUM BROMIDE 5 MG: 10 INJECTION, SOLUTION INTRAVENOUS at 20:59:00

## 2023-05-25 RX ADMIN — CEFOXITIN 2 G: 2 INJECTION, POWDER, FOR SOLUTION INTRAVENOUS at 21:06:00

## 2023-05-25 RX ADMIN — MAGNESIUM SULFATE HEPTAHYDRATE 1 G: 500 INJECTION, SOLUTION INTRAMUSCULAR; INTRAVENOUS at 21:06:00

## 2023-05-25 RX ADMIN — KETOROLAC TROMETHAMINE 30 MG: 30 INJECTION, SOLUTION INTRAMUSCULAR; INTRAVENOUS at 21:45:00

## 2023-05-25 RX ADMIN — ROCURONIUM BROMIDE 10 MG: 10 INJECTION, SOLUTION INTRAVENOUS at 21:14:00

## 2023-05-25 RX ADMIN — SODIUM CHLORIDE, SODIUM LACTATE, POTASSIUM CHLORIDE, CALCIUM CHLORIDE: 600; 310; 30; 20 INJECTION, SOLUTION INTRAVENOUS at 20:55:00

## 2023-05-25 RX ADMIN — ROCURONIUM BROMIDE 10 MG: 10 INJECTION, SOLUTION INTRAVENOUS at 21:07:00

## 2023-05-25 RX ADMIN — LIDOCAINE HYDROCHLORIDE 50 MG: 10 INJECTION, SOLUTION EPIDURAL; INFILTRATION; INTRACAUDAL; PERINEURAL at 21:00:00

## 2023-05-25 RX ADMIN — PHENYLEPHRINE HCL 100 MCG: 10 MG/ML VIAL (ML) INJECTION at 21:12:00

## 2023-05-25 RX ADMIN — GLYCOPYRROLATE 0.8 MG: 0.2 INJECTION, SOLUTION INTRAMUSCULAR; INTRAVENOUS at 21:45:00

## 2023-05-25 RX ADMIN — ONDANSETRON 4 MG: 2 INJECTION INTRAMUSCULAR; INTRAVENOUS at 21:12:00

## 2023-05-25 RX ADMIN — NEOSTIGMINE METHYLSULFATE 4 MG: 1 INJECTION, SOLUTION INTRAVENOUS at 21:45:00

## 2023-05-25 RX ADMIN — PHENYLEPHRINE HCL 100 MCG: 10 MG/ML VIAL (ML) INJECTION at 21:16:00

## 2023-05-25 NOTE — ED QUICK NOTES
Report received from [facility]. Pt lying on cart, awake and alert, skin w/d,resps reg/unlabored. Pt reports headache and nausea as well as abdominal pain, requesting pain medication. Pt noted to have K rider infusing via pump per order as well as NS. Pt aware will be going to floor shortly.

## 2023-05-25 NOTE — PROGRESS NOTES
formerly [facility] Pharmacy Note:  Renal Adjustment for ampicillin/sulbactam (UNASYN)    Brittany Coy is a 52year old patient who has been prescribed ampicillin/sulbactam (UNASYN) 1.5 gm every 6 hrs. The estimated creatinine clearance is 99.2 mL/min (based on SCr of 0.6 mg/dL). The dose has been adjusted to ampicillin/sulbactam (UNASYN) 3 gm every 6 hrs per hospital renal dose adjustment protocol for treatment of intra-abdominal infection. Pharmacy will follow and adjust dose as warranted for additional renal function changes.     Thank you,    [facility]  5/25/2023  8:05 AM

## 2023-05-25 NOTE — PLAN OF CARE
Received patient from ED. Alert, awake. Breathing unlabored. No pain at the moment. Plan for surgery later today.

## 2023-05-25 NOTE — ED INITIAL ASSESSMENT (HPI)
Pt reports having bloating, lower abdominal pain, lower back pain, vomiting, chills and headaches since Tuesday night. Pt denies diarrhea or fevers.

## 2023-05-26 VITALS
TEMPERATURE: 98 F | SYSTOLIC BLOOD PRESSURE: 127 MMHG | BODY MASS INDEX: 25.71 KG/M2 | WEIGHT: 160 LBS | RESPIRATION RATE: 18 BRPM | OXYGEN SATURATION: 97 % | HEIGHT: 66 IN | DIASTOLIC BLOOD PRESSURE: 67 MMHG | HEART RATE: 82 BPM

## 2023-05-26 LAB
ANION GAP SERPL CALC-SCNC: 6 MMOL/L (ref 0–18)
BASOPHILS # BLD AUTO: 0.01 X10(3) UL (ref 0–0.2)
BASOPHILS NFR BLD AUTO: 0.1 %
BUN BLD-MCNC: 16 MG/DL (ref 7–18)
CALCIUM BLD-MCNC: 8.1 MG/DL (ref 8.5–10.1)
CHLORIDE SERPL-SCNC: 113 MMOL/L (ref 98–112)
CO2 SERPL-SCNC: 20 MMOL/L (ref 21–32)
CREAT BLD-MCNC: 0.47 MG/DL
EOSINOPHIL # BLD AUTO: 0 X10(3) UL (ref 0–0.7)
EOSINOPHIL NFR BLD AUTO: 0 %
ERYTHROCYTE [DISTWIDTH] IN BLOOD BY AUTOMATED COUNT: 13.1 %
GFR SERPLBLD BASED ON 1.73 SQ M-ARVRAT: 112 ML/MIN/1.73M2 (ref 60–?)
GLUCOSE BLD-MCNC: 101 MG/DL (ref 70–99)
HCT VFR BLD AUTO: 36.1 %
HGB BLD-MCNC: 11.5 G/DL
IMM GRANULOCYTES # BLD AUTO: 0.03 X10(3) UL (ref 0–1)
IMM GRANULOCYTES NFR BLD: 0.3 %
LYMPHOCYTES # BLD AUTO: 0.48 X10(3) UL (ref 1–4)
LYMPHOCYTES NFR BLD AUTO: 5.1 %
MCH RBC QN AUTO: 32.2 PG (ref 26–34)
MCHC RBC AUTO-ENTMCNC: 31.9 G/DL (ref 31–37)
MCV RBC AUTO: 101.1 FL
MONOCYTES # BLD AUTO: 0.39 X10(3) UL (ref 0.1–1)
MONOCYTES NFR BLD AUTO: 4.1 %
NEUTROPHILS # BLD AUTO: 8.54 X10 (3) UL (ref 1.5–7.7)
NEUTROPHILS # BLD AUTO: 8.54 X10(3) UL (ref 1.5–7.7)
NEUTROPHILS NFR BLD AUTO: 90.4 %
OSMOLALITY SERPL CALC.SUM OF ELEC: 289 MOSM/KG (ref 275–295)
PLATELET # BLD AUTO: 175 10(3)UL (ref 150–450)
POTASSIUM SERPL-SCNC: 3.8 MMOL/L (ref 3.5–5.1)
RBC # BLD AUTO: 3.57 X10(6)UL
SODIUM SERPL-SCNC: 139 MMOL/L (ref 136–145)
WBC # BLD AUTO: 9.5 X10(3) UL (ref 4–11)

## 2023-05-26 RX ORDER — CEPHALEXIN 500 MG/1
500 CAPSULE ORAL 2 TIMES DAILY
Qty: 10 CAPSULE | Refills: 0 | Status: SHIPPED | OUTPATIENT
Start: 2023-05-26 | End: 2023-05-31

## 2023-05-26 NOTE — PLAN OF CARE
Received pt post sx at [facility] [address]. Pt AOx4. VSS,afebrile. Spo2 >90% on RA. No tele orders. EP. 3 lap sites to abdomen post appendectomy. Up at [facility]. IVF, IV abx. No c/o N/V/D/C at this time. Discomfort to abdomen, ice packs provided. Pt updated on POC. Call light within reach, safety precautions in place. No further pt needs at this time.      Problem: Patient/Family Goals  Goal: Patient/Family Long Term Goal  Description: Patient's Long Term Goal: discharge home     Interventions:  - SX  - Meds  - See additional Care Plan goals for specific interventions  Outcome: Progressing  Goal: Patient/Family Short Term Goal  Description: Patient's Short Term Goal: sleep, free of pain    Interventions:   - Meds, cluster care   - See additional Care Plan goals for specific interventions  Outcome: Progressing     Problem: GASTROINTESTINAL - ADULT  Goal: Minimal or absence of nausea and vomiting  Description: INTERVENTIONS:  - Maintain adequate hydration with IV or PO as ordered and tolerated  - Nasogastric tube to low intermittent suction as ordered  - Evaluate effectiveness of ordered antiemetic medications  - Provide nonpharmacologic comfort measures as appropriate  - Advance diet as tolerated, if ordered  - Obtain nutritional consult as needed  - Evaluate fluid balance  Outcome: Progressing  Goal: Maintains or returns to baseline bowel function  Description: INTERVENTIONS:  - Assess bowel function  - Maintain adequate hydration with IV or PO as ordered and tolerated  - Evaluate effectiveness of GI medications  - Encourage mobilization and activity  - Obtain nutritional consult as needed  - Establish a toileting routine/schedule  - Consider collaborating with pharmacy to review patient's medication profile  Outcome: Progressing

## 2023-05-26 NOTE — ANESTHESIA PROCEDURE NOTES
Airway  Date/Time: 5/25/2023 9:01 PM  Urgency: elective    Airway not difficult    General Information and Staff    Patient location during procedure: OR  Anesthesiologist: Nasra Toro MD  Performed: anesthesiologist   Performed by: Nasra Toro MD  Authorized by: Nasra Toro MD      Indications and Patient Condition  Indications for airway management: anesthesia  Sedation level: deep  Preoxygenated: yes  Patient position: sniffing  Mask difficulty assessment: 0 - not attempted    Final Airway Details  Final airway type: endotracheal airway      Successful airway: ETT  Cuffed: yes   Successful intubation technique: direct laryngoscopy  Facilitating devices/methods: cricoid pressure and rapid sequence intubation  Endotracheal tube insertion site: oral  Blade: Isela  Blade size: #3  ETT size (mm): 7.5    Cormack-Lehane Classification: grade I - full view of glottis  Placement verified by: chest auscultation and capnometry   Measured from: lips  ETT to lips (cm): 21  Number of attempts at approach: 1    Additional Comments   OETT placed without airway or dental trauma.

## 2023-05-26 NOTE — ANESTHESIA PROCEDURE NOTES
Peripheral IV  Date/Time: 5/25/2023 9:22 PM  Inserted by:  Kingsley De La Cruz MD    Placement  Needle size: 20 G  Laterality: left  Location: hand  Local anesthetic: none  Site prep: alcohol  Technique: anatomical landmarks  Attempts: 2

## 2023-05-26 NOTE — PLAN OF CARE
NURSING DISCHARGE NOTE    Discharged Home via Wheelchair. Accompanied by RN  Belongings Taken by patient/family. Patient discharged in stable condition and with all of belongings. IV's taken out and AVS reviewed. Patient verbalized understanding of discharge instructions. Abx for UTI placed by gen sx PA.

## 2023-05-30 ENCOUNTER — PATIENT OUTREACH (OUTPATIENT)
Dept: CASE MANAGEMENT | Age: 53
End: 2023-05-30

## 2023-06-06 ENCOUNTER — TELEPHONE (OUTPATIENT)
Facility: LOCATION | Age: 53
End: 2023-06-06

## 2023-06-14 ENCOUNTER — OFFICE VISIT (OUTPATIENT)
Facility: LOCATION | Age: 53
End: 2023-06-14
Payer: COMMERCIAL

## 2023-06-14 ENCOUNTER — TELEPHONE (OUTPATIENT)
Facility: LOCATION | Age: 53
End: 2023-06-14

## 2023-06-14 DIAGNOSIS — I83.811 VARICOSE VEINS OF RIGHT LOWER EXTREMITY WITH PAIN: ICD-10-CM

## 2023-06-14 DIAGNOSIS — I87.2 PERIPHERAL VENOUS INSUFFICIENCY: ICD-10-CM

## 2023-06-14 DIAGNOSIS — I87.2 SAPHENOFEMORAL VENOUS REFLUX: Primary | ICD-10-CM

## 2023-06-14 DIAGNOSIS — I83.891 VARICOSE VEINS OF LEG WITH EDEMA, RIGHT: ICD-10-CM

## 2023-06-14 PROCEDURE — 36482 ENDOVEN THER CHEM ADHES 1ST: CPT | Performed by: SURGERY

## 2023-06-14 NOTE — TELEPHONE ENCOUNTER
[address] PROCEDURES ON 5/23/23 UZVK#[id number] VALID 4/13/2023-10/10/2023

## 2023-06-14 NOTE — PROCEDURES
Patient    Date of procedure:   June 14, 2023    Preoperative diagnosis:   right symptomatic greater saphenous vein insufficiency    Postoperative diagnosis:   Same    Procedure: Endovenous ablation of the right greater saphenous vein with VenaSeal closure system    Surgeon:   Troy Grijalva MD,[facility]    Anesthesia:  Local    History of present illness: The patient was initially evaluated in the office for lower extremity symptoms of fatigue, heaviness and aching suggestive of symptomatic varicose veins. Examination reveals clinical reflux of the saphenous vein. The patient reports pain and discomfort in the area of the varicosities. Venous insufficiency Doppler studies demonstrate venous insufficiency and saphenous vein incompetence. Treatment options were reviewed in detail with the patient. The patient has decided to proceed with endovenous ablation of the greater saphenous vein. The risks, benefits, complications, possible outcomes and alternatives of the procedure were explained to the patient in detail. Expected postoperative pain, recuperation and postoperative course was also reviewed. All questions from the patient were answered in detail. The patient did verbalize understanding and does not have any further questions at this time. The patient does wish to proceed with the proposed procedure. Operative summary:   The patient was positioned on the procedure table. Preprocedural venous mapping was performed. The tributary veins as well as the saphenous vein was identified and mapped. The patient's lower extremity was prepped and draped in the usual sterile fashion. Beginning at the most distal location medial distal calf, 1% lidocaine was injected into the skin. The saphenous vein was then cannulated under ultrasound guidance. The guidewire was passed using Seldinger technique through the needle into the saphenous vein. The needle was then withdrawn.   The position of the guidewire within the lumen of the vein was confirmed sonographically. Again using local anesthesia, a small incision was made at the entrance point in the skin. The dilator and sheath were then passed over the guidewire again using Seldinger technique and ultrasound guidance. The guidewire and dilator were withdrawn leaving the sheath in place. There was good flow and flush from the side-port with no evidence of resistance. The endovascular ablation catheter with inner cannula was then placed through the sheath and advanced into position. This was again performed under ultrasound guidance. The catheter was confirmed to be in an intraluminal position. The tip of the catheter was confirmed to be  5 cm from the junction of the deep system and the saphenous vein by ultrasound imaging. The ablation was then performed. The cyanoacrylate adhesive was precisely primed into the 5 Irish delivery catheter prior to deployment in the vein. Beginning proximally, 2 aliquots of adhesive were deployed 1 cm apart. Subsequently 3 minutes of firm compression was held in the area of the junction to the deep system. After this initial deployment, one aliquot was dispensed every 3 cm distally. After the third deployment, 30 seconds of compression was held along the course of the vein to complete ablation. Sequence was performed along the entire length of the vein. Having treated the entire length of the vein, the catheter and introducer sheath were removed from the access site. Manual compression was held over the incision. Good hemostasis was achieved with manual compression. Repeat ultrasound imaging along the entire area of treatment was performed to confirm complete coaptation and closure of the treated vein. The junction into the deep system was again examined sonographically. There was no sonographic evidence of extension of the adhesive into the deep system.     The total length of the vein treated was approximately 48 cm using a total of 1.9cc cyanoacrylate adhesive. The patient tolerated the procedure well. The patient did not experience any immediate adverse reactions. Steri-Strips were placed across the skin incision. A sterile dressing was subsequently placed. Discharge instructions were given to the patient. The patient will follow-up with a routine postprocedural ultrasound in 7 to 10 days to confirm closure and coaptation of the treated vein. The patient will follow up with me for repeat examination in the next 2 to 3 weeks. The patient did not have any further questions at the conclusion of this procedure.   The patient was discharged from the procedure room in stable condition    Hank Horne MD,FACS

## 2023-06-20 ENCOUNTER — NURSE ONLY (OUTPATIENT)
Facility: LOCATION | Age: 53
End: 2023-06-20
Payer: COMMERCIAL

## 2023-06-20 VITALS — TEMPERATURE: 98 F | HEART RATE: 76 BPM

## 2023-06-20 NOTE — TELEPHONE ENCOUNTER
Dr. Jacquelyn Craft,     Please sign off on form if you agree to: FMLA due to pt's appendicitis, 5/23/23-7/6/23 (6wk post op recovery)  (Please place your signature on the first page only)    -From your Inbasket, Highlight the patient and click Chart   -Double click the 6/6/21 Forms Completion telephone encounter  -Scroll down to the Media section   -Click the blue Hyperlink: FMLA Dr Jacquelyn Craft 0/51/78    -Click Acknowledge located at the bottom right corner (if you do not see acknowledge, try maximizing your window)   -Drag the mouse into the blank space of the document and a + sign will appear. Left click to   electronically sign the document.      Thank you,    Ludmila Whittingtno

## 2023-06-26 ENCOUNTER — OFFICE VISIT (OUTPATIENT)
Facility: LOCATION | Age: 53
End: 2023-06-26

## 2023-06-26 VITALS — TEMPERATURE: 97 F

## 2023-06-26 DIAGNOSIS — Z98.890 POST-OPERATIVE STATE: Primary | ICD-10-CM

## 2023-07-07 ENCOUNTER — TELEPHONE (OUTPATIENT)
Facility: LOCATION | Age: 53
End: 2023-07-07

## 2023-07-07 NOTE — TELEPHONE ENCOUNTER
Patient called to reschedule her Venaseal with Dr. Ruthann Maya on 7/12 due to the ultrasound tech being out. I told patient I would send a message to out vein  Lien Aquino, who would call her back next week. Patient expressed understanding. Lien Aquino notified.

## 2023-07-26 ENCOUNTER — TELEPHONE (OUTPATIENT)
Facility: LOCATION | Age: 53
End: 2023-07-26

## 2023-07-28 ENCOUNTER — TELEPHONE (OUTPATIENT)
Facility: LOCATION | Age: 53
End: 2023-07-28

## 2023-07-28 NOTE — TELEPHONE ENCOUNTER
Patient called after having rt leg sx, is experiencing ache. Pain level is 8 but tolerable to touch. Pain's below knee.  Wants to confirm if normal.    Please call P# [phone]

## 2023-07-28 NOTE — TELEPHONE ENCOUNTER
S/w pt. She states she has bruising and pain in her right medial leg under knee. Reviewed sx and s/s with RUBÉN Major, no new orders at this time.   Reviewed with pt if s/s concerns contact pcp or if worsens urgent care

## 2023-08-28 DIAGNOSIS — B00.1 RECURRENT COLD SORES: ICD-10-CM

## 2023-08-28 RX ORDER — VALACYCLOVIR HYDROCHLORIDE 500 MG/1
TABLET, FILM COATED ORAL
Qty: 90 TABLET | Refills: 0 | Status: SHIPPED | OUTPATIENT
Start: 2023-08-28

## 2023-08-28 NOTE — TELEPHONE ENCOUNTER
Medication(s) to Refill:   Requested Prescriptions     Pending Prescriptions Disp Refills    VALACYCLOVIR 500 MG Oral Tab [Pharmacy Med Name: Valacyclovir Hydrochloride 500 Mg Tab Nort] 90 tablet 0     Sig: TAKE ONE TABLET BY MOUTH ONE TIME DAILY         Reason for Medication Refill being sent to Provider / Reason Protocol Failed:  [] 90 day refill has already been granted  [] Blood Pressure out of range  [] Labs Abnormal/over due  [] Medication not previously prescribed by Provider  [] Non-Protocol Medication  [] Controlled Substance   [] Due for appointment- no future appointment scheduled  [] No Follow up specified      Last Time Medication was Filled:  4/11/23      Last Office Visit with PCP: 1/10/23    When Patient was Due Back to the Office:    (from when PCP last addressed condition)    Future Appointments:  Future Appointments   Date Time Provider Lia May   9/13/2023  8:00 AM Marge High MD [facility]         Last Blood Pressures:  BP Readings from Last 2 Encounters:  05/26/23 : 127/67  01/10/23 : 110/70        Action taken:  [] Refill approved per protocol  [] Routing to provider for approval

## 2023-09-13 ENCOUNTER — OFFICE VISIT (OUTPATIENT)
Facility: LOCATION | Age: 53
End: 2023-09-13
Payer: COMMERCIAL

## 2023-09-13 VITALS — HEART RATE: 95 BPM | TEMPERATURE: 98 F

## 2023-09-13 DIAGNOSIS — I83.812 VARICOSE VEINS OF LEG WITH PAIN, LEFT: ICD-10-CM

## 2023-09-13 DIAGNOSIS — I87.2 PERIPHERAL VENOUS INSUFFICIENCY: ICD-10-CM

## 2023-09-13 DIAGNOSIS — I87.2 SAPHENOFEMORAL VENOUS REFLUX: Primary | ICD-10-CM

## 2023-09-13 DIAGNOSIS — I83.892 VARICOSE VEINS OF LEG WITH SWELLING, LEFT: ICD-10-CM

## 2023-09-13 PROCEDURE — 36475 ENDOVENOUS RF 1ST VEIN: CPT | Performed by: SURGERY

## 2023-10-03 ENCOUNTER — OFFICE VISIT (OUTPATIENT)
Facility: LOCATION | Age: 53
End: 2023-10-03
Payer: COMMERCIAL

## 2023-10-03 DIAGNOSIS — I83.892 VARICOSE VEINS OF LEG WITH SWELLING, LEFT: ICD-10-CM

## 2023-10-03 DIAGNOSIS — I87.2 SAPHENOFEMORAL VENOUS REFLUX: ICD-10-CM

## 2023-10-03 DIAGNOSIS — I87.2 PERIPHERAL VENOUS INSUFFICIENCY: Primary | ICD-10-CM

## 2023-10-03 DIAGNOSIS — I83.812 VARICOSE VEINS OF LEG WITH PAIN, LEFT: ICD-10-CM

## 2023-10-03 PROCEDURE — 99214 OFFICE O/P EST MOD 30 MIN: CPT | Performed by: SURGERY

## 2023-10-06 NOTE — PROGRESS NOTES
Follow Up Visit Note       Active Problems      No diagnosis found. Chief Complaint   Patient presents with:  Post-Op: PO - L leg venaseal 9/13/23      Allergies  Shyrl Files has No Known Allergies. Past Medical / Surgical / Social / Family History    The past medical and past surgical history have been reviewed by me today. Past Medical History:   Diagnosis Date    Abnormal uterine bleeding     Amenorrhea     Decorative tattoo     Depression     Headache(784.0)     migraine    Human papilloma virus infection     Migraines      Past Surgical History:   Procedure Laterality Date    COLPOSCOPY, CERVIX W/UPPER ADJACENT VAGINA; W/BIOPSY(S), CERVIX  2002       The family history and social history have been reviewed by me today. Social History    Tobacco Use      Smoking status: Former        Packs/day: 1.00        Years: 22.00        Additional pack years: 0.00        Total pack years: 22.00        Types: Cigarettes        Quit date: 5/15/2013        Years since quitting: 10.4      Smokeless tobacco: Never    Alcohol use: Yes      Comment: socially       Current Outpatient Medications:     VALACYCLOVIR 500 MG Oral Tab, TAKE ONE TABLET BY MOUTH ONE TIME DAILY, Disp: 90 tablet, Rfl: 0    HYDROcodone-acetaminophen 5-325 MG Oral Tab, Take 1 tablet by mouth every 6 (six) hours as needed for Pain., Disp: 15 tablet, Rfl: 0    topiramate 25 MG Oral Tab, Take 3 tablets (75 mg total) by mouth 2 (two) times daily. , Disp: 180 tablet, Rfl: 5    PARoxetine 20 MG Oral Tab, Take 1 tablet (20 mg total) by mouth every morning., Disp: 90 tablet, Rfl: 3    triamcinolone 0.1 % External Cream, Apply topically 2 (two) times daily as needed. , Disp: 60 g, Rfl: 1    SUMAtriptan Succinate 100 MG Oral Tab, Take 1 tablet (100 mg total) by mouth every 2 (two) hours as needed for Migraine. , Disp: 9 tablet, Rfl: 5    hydrochlorothiazide 12.5 MG Oral Tab, Take 1 tablet (12.5 mg total) by mouth daily as needed. , Disp: 90 tablet, Rfl: 1 Review of Systems  The Review of Systems has been reviewed by me during today. Review of Systems   Constitutional:  Negative for diaphoresis, fever and unexpected weight change. HENT:  Negative for congestion, nosebleeds, sore throat and trouble swallowing. Eyes:  Negative for pain, discharge, redness and visual disturbance. Respiratory:  Negative for cough, shortness of breath and wheezing. Cardiovascular:  Negative for chest pain and palpitations. Gastrointestinal:  Negative for abdominal distention, abdominal pain, blood in stool, constipation, diarrhea, nausea and vomiting. Genitourinary:  Negative for difficulty urinating, dysuria and frequency. Musculoskeletal:  Negative for joint swelling and neck pain. Skin:  Negative for color change and rash. Neurological:  Negative for dizziness, syncope and light-headedness. Hematological:  Negative for adenopathy. Does not bruise/bleed easily. Psychiatric/Behavioral:  Negative for confusion, hallucinations and sleep disturbance. Physical Findings   There were no vitals taken for this visit. Physical Exam  Constitutional:       Appearance: She is well-developed. HENT:      Head: Normocephalic and atraumatic. Eyes:      Pupils: Pupils are equal, round, and reactive to light. Cardiovascular:      Rate and Rhythm: Normal rate and regular rhythm. Pulmonary:      Effort: Pulmonary effort is normal.      Breath sounds: Normal breath sounds. Abdominal:      General: Bowel sounds are normal. There is no distension. Palpations: Abdomen is soft. Tenderness: There is no abdominal tenderness. Musculoskeletal:         General: No deformity. Normal range of motion. Skin:     General: Skin is warm and dry. Findings: No rash. Neurological:      Mental Status: She is alert and oriented to person, place, and time.        Extremities:    Right lower extremity-  Thigh-no significant varicosities are noted above the knee  Calf-   multiple protuberant varicosities in the medial calf measuring 10 to 12 mm in diameter and 8 to 10 cm in length. Posterior calf 8 mm varicosities spanning over 10 to 12 cm in length  Left lower extremity-      Thigh-no significant varicosities noted above the knee  Calf-posterior calf 6 mm varicosity spanning 8 cm primarily in the popliteal fossa. Medial calf 8 to 10 mm varicosity spanning over 10 cm  Bilaterally there is no evidence of chronic venous stasis dermatitis, hyperpigmentation or venous stasis ulcers. History of Present Illness    Today, I am seeing this patient for follow up-patient is here for follow-up visit status post left Venaseal procedure September 13, 2023. Patient is also status post right Venaseal procedure June 14, 2023    The patient reports that overall she has had improvement in her symptoms. She does have multiple residual protuberant varicosities in the right lower extremity as noted above. Patient wishes to proceed with injection sclerotherapy of these residual symptomatic varicosities. If injection sclerotherapy is unsuccessful due to size of veins, patient will consider Trivex procedure and/or stab ligation. Assessment   No diagnosis found. Plan     Injection sclerotherapy of residual protuberant varicosities right lower extremity, patient would consider Trivex and/or stab ligation if injection sclerotherapy unsuccessful due to size of veins    Thank you for allowing me to participate in your patient's care       No orders of the defined types were placed in this encounter. Imaging & Referrals   None    Follow Up  No follow-ups on file.     Sharon Lima MD

## 2023-10-11 ENCOUNTER — TELEPHONE (OUTPATIENT)
Dept: FAMILY MEDICINE CLINIC | Facility: CLINIC | Age: 53
End: 2023-10-11

## 2023-10-18 ENCOUNTER — OFFICE VISIT (OUTPATIENT)
Facility: LOCATION | Age: 53
End: 2023-10-18
Payer: COMMERCIAL

## 2023-10-18 ENCOUNTER — HOSPITAL ENCOUNTER (OUTPATIENT)
Dept: GENERAL RADIOLOGY | Age: 53
Discharge: HOME | End: 2023-10-18
Attending: PODIATRIST
Payer: COMMERCIAL

## 2023-10-18 DIAGNOSIS — M79.672 PAIN OF LEFT HEEL: ICD-10-CM

## 2023-10-18 DIAGNOSIS — T14.8XXA CONTUSION OF BONE: ICD-10-CM

## 2023-10-18 DIAGNOSIS — M76.62 ACHILLES TENDINITIS OF LEFT LOWER EXTREMITY: Primary | ICD-10-CM

## 2023-10-18 DIAGNOSIS — M79.672 LEFT FOOT PAIN: ICD-10-CM

## 2023-10-18 DIAGNOSIS — M76.62 ACHILLES TENDINITIS OF LEFT LOWER EXTREMITY: ICD-10-CM

## 2023-10-18 PROCEDURE — 73630 X-RAY EXAM OF FOOT: CPT | Performed by: PODIATRIST

## 2023-10-18 RX ORDER — MELOXICAM 7.5 MG/1
7.5 TABLET ORAL DAILY
Qty: 30 TABLET | Refills: 0 | Status: SHIPPED | OUTPATIENT
Start: 2023-10-18 | End: 2023-11-17

## 2023-10-19 NOTE — PROGRESS NOTES
[facility]  Progress Note    Jj Gautam is a 52year old female. Patient presents with: Foot Pain: Consult - left foot pain - saw podiatrist in past for other foot and had success with cortisone injections and exercises. Onset 1 month increasing pain, 2 weeks 10/10 constant pain. Denies numbness, tingling, swelling. Wearing compression device with ball on foot        HPI:     Patient is a pleasant 52-year-old female who is presenting to clinic today with complaints of left foot pain. Patient does have a history of planter fasciitis and saw a podiatrist in the past.  She states that she received a cortisone injection and physical therapy and it relieved her of her symptoms. She states that this pain has been bothering her for the past month. She does relate that the pain has increased the past 2 weeks. Rates the pain 10/10. She denies any injuries. She is wearing a compression device to the ball of her foot, which she does states helps. Denies any other complaints and is here for further evaluation and care. Denies recent nausea, vomiting, fever, chills      Allergies: Patient has no known allergies. Current Outpatient Medications   Medication Sig Dispense Refill    Meloxicam 7.5 MG Oral Tab Take 1 tablet (7.5 mg total) by mouth daily. 30 tablet 0    VALACYCLOVIR 500 MG Oral Tab TAKE ONE TABLET BY MOUTH ONE TIME DAILY 90 tablet 0    topiramate 25 MG Oral Tab Take 3 tablets (75 mg total) by mouth 2 (two) times daily. 180 tablet 5    PARoxetine 20 MG Oral Tab Take 1 tablet (20 mg total) by mouth every morning. 90 tablet 3    triamcinolone 0.1 % External Cream Apply topically 2 (two) times daily as needed. 60 g 1    SUMAtriptan Succinate 100 MG Oral Tab Take 1 tablet (100 mg total) by mouth every 2 (two) hours as needed for Migraine. 9 tablet 5    hydrochlorothiazide 12.5 MG Oral Tab Take 1 tablet (12.5 mg total) by mouth daily as needed.  90 tablet 1    HYDROcodone-acetaminophen 5-325 MG Oral Tab Take 1 tablet by mouth every 6 (six) hours as needed for Pain. (Patient not taking: Reported on 10/18/2023) 15 tablet 0      Past Medical History:   Diagnosis Date    Abnormal uterine bleeding     Amenorrhea     Decorative tattoo     Depression     Headache(784.0)     migraine    Human papilloma virus infection     Migraines       Past Surgical History:   Procedure Laterality Date    COLPOSCOPY, CERVIX W/UPPER ADJACENT VAGINA; W/BIOPSY(S), CERVIX  2002      Family History   Problem Relation Age of Onset    Heart Attack Father         Heart Attack    Breast Cancer Maternal Grandmother 67    Neurological Disorder Mother         Alzheimers      Social History    Socioeconomic History      Marital status:     Tobacco Use      Smoking status: Former        Packs/day: 1.00        Years: 22.00        Additional pack years: 0.00        Total pack years: 22.00        Types: Cigarettes        Quit date: 5/15/2013        Years since quitting: 10.4      Smokeless tobacco: Never    Vaping Use      Vaping Use: Never used    Substance and Sexual Activity      Alcohol use: Yes        Comment: socially      Drug use: No      Sexual activity: Yes        Partners: Male        Birth control/protection: Tubal Ligation    Other Topics      Concerns:        Caffeine Concern: Yes        Exercise: No          REVIEW OF SYSTEMS:     10 point ROS completed and was negative, except for pertinent positive and negatives stated in subjective. EXAM:   Left lower extremity focused exam:  GENERAL: well developed, well nourished, in no apparent distress  EXTREMITIES:   1. Integument: Skin appears moist, warm, and supple with positive hair growth. There are no color changes. No open lesions. No macerations. No Hyperkeratotic lesions. 2. Vascular: Dorsalis pedis 2/4 bilateral and posterior tibial pulses 2/4 bilateral, capillary refill normal.  3. Neurological: Gross sensation intact via light touch bilaterally.   Normal sharp/dull sensation   4. Musculoskeletal: All muscle groups are graded 5/5 in the foot and ankle with no pain elicited in any direction. No pain with palpation to plantar aspect of left heel. There is pain with side-to-side heel squeeze, as well as at the insertion of the Achilles tendon. Patient does have adequate plantarflexion strength, negative Vicente test, no current signs of Achilles tendon rupture         ASSESSMENT AND PLAN:   Diagnoses and all orders for this visit:    Achilles tendinitis of left lower extremity  -     XR FOOT WEIGHTBEARING (3 VIEWS), LEFT (CPT=73630); Future    Contusion of bone  -     XR FOOT WEIGHTBEARING (3 VIEWS), LEFT (CPT=73630); Future    Pain of left heel  -     XR FOOT WEIGHTBEARING (3 VIEWS), LEFT (CPT=73630); Future    Left foot pain  -     XR FOOT WEIGHTBEARING (3 VIEWS), LEFT (CPT=73630); Future    Other orders  -     Meloxicam 7.5 MG Oral Tab; Take 1 tablet (7.5 mg total) by mouth daily. Plan:   -Patient was seen and evaluated today in clinic.    -Radiograph was obtained on patient's way out today, and I was able to independently review results, which showed no acute osseous abnormalities. -I did discuss clinical exam findings with patient today, which are consistent with contusion/stress reaction to her left heel, as well as insertional Achilles tendinitis of the left lower extremity. Discussed that these are both usually overuse injuries.    -Discussed treatment recommendations today, which would consist of weightbearing as tolerated in cam boot. Patient agrees to this and was supplied a short cam boot today. -Rx: Meloxicam 7.5 mg 1 p.o. daily for 2 weeks    -Recommend patient rest, ice, and elevate her left lower extremity    -We will look into moving forward with formal physical therapy if patient is improving at next visit    -The patient indicates understanding of these issues and agrees to the plan.     Time spent reviewing pertinent information from patient's chart, reviewing any pertinent imaging, obtaining history and physical exam, and discussing and mutually agreeing on a treatment plan: 30 minutes    RTC 2 weeks      ALPESH Gusman [facility]        10/19/2023    Dragon speech recognition software was used to prepare this note. Errors in word recognition may occur. Please contact me with any questions/concerns with this note.

## 2023-10-20 ENCOUNTER — TELEPHONE (OUTPATIENT)
Facility: LOCATION | Age: 53
End: 2023-10-20

## 2023-11-01 ENCOUNTER — OFFICE VISIT (OUTPATIENT)
Facility: LOCATION | Age: 53
End: 2023-11-01
Payer: COMMERCIAL

## 2023-11-01 DIAGNOSIS — M79.672 PAIN OF LEFT HEEL: ICD-10-CM

## 2023-11-01 DIAGNOSIS — T14.8XXA CONTUSION OF BONE: ICD-10-CM

## 2023-11-01 DIAGNOSIS — M72.2 PLANTAR FASCIITIS OF LEFT FOOT: Primary | ICD-10-CM

## 2023-11-01 DIAGNOSIS — M62.462 GASTROCNEMIUS EQUINUS OF LEFT LOWER EXTREMITY: ICD-10-CM

## 2023-11-01 DIAGNOSIS — M76.62 ACHILLES TENDINITIS OF LEFT LOWER EXTREMITY: ICD-10-CM

## 2023-11-01 NOTE — PROGRESS NOTES
[facility]  Progress Note    Jacob Butler is a 52year old female. Patient presents with: Foot Pain: Patient is here for left foot plantar fasciitis. She is here today in the office in a cam boot. Patient states that she has no pain in the boot, when she takes off the boot 7/10 - throbbing and tingling. Her work is not allowing her to work with cam boot. HPI:     Patient is a pleasant 49-year-old female who is presenting to clinic today for recheck of left heel pain. Patient states that she has been ambulating in a cam boot, which does help with her pain. She states that when she takes the boot off, the pain is the same as before. Rates the pain 7/10. She states that she is feeling like she is walking on piedad on her heel. A lot of the pain is circumferentially around the bottom of the left heel. She is taking meloxicam as prescribed. Patient states that her work is not allowing her to work with the cam boot. She is denying any recent injuries. Denies any other concerns and here for further evaluation and care. Denies recent nausea, vomiting, fever, chills. Allergies: Patient has no known allergies. Current Outpatient Medications   Medication Sig Dispense Refill    Meloxicam 7.5 MG Oral Tab Take 1 tablet (7.5 mg total) by mouth daily. 30 tablet 0    VALACYCLOVIR 500 MG Oral Tab TAKE ONE TABLET BY MOUTH ONE TIME DAILY 90 tablet 0    topiramate 25 MG Oral Tab Take 3 tablets (75 mg total) by mouth 2 (two) times daily. 180 tablet 5    PARoxetine 20 MG Oral Tab Take 1 tablet (20 mg total) by mouth every morning. 90 tablet 3    triamcinolone 0.1 % External Cream Apply topically 2 (two) times daily as needed. 60 g 1    SUMAtriptan Succinate 100 MG Oral Tab Take 1 tablet (100 mg total) by mouth every 2 (two) hours as needed for Migraine. 9 tablet 5    hydrochlorothiazide 12.5 MG Oral Tab Take 1 tablet (12.5 mg total) by mouth daily as needed.  90 tablet 1 HYDROcodone-acetaminophen 5-325 MG Oral Tab Take 1 tablet by mouth every 6 (six) hours as needed for Pain. (Patient not taking: Reported on 11/1/2023) 15 tablet 0      Past Medical History:   Diagnosis Date    Abnormal uterine bleeding     Amenorrhea     Decorative tattoo     Depression     Headache(784.0)     migraine    Human papilloma virus infection     Migraines       Past Surgical History:   Procedure Laterality Date    COLPOSCOPY, CERVIX W/UPPER ADJACENT VAGINA; W/BIOPSY(S), CERVIX  2002      Family History   Problem Relation Age of Onset    Heart Attack Father         Heart Attack    Breast Cancer Maternal Grandmother 67    Neurological Disorder Mother         Alzheimers      Social History    Socioeconomic History      Marital status:     Tobacco Use      Smoking status: Former        Packs/day: 1.00        Years: 22.00        Additional pack years: 0.00        Total pack years: 22.00        Types: Cigarettes        Quit date: 5/15/2013        Years since quitting: 10.4      Smokeless tobacco: Never    Vaping Use      Vaping Use: Never used    Substance and Sexual Activity      Alcohol use: Yes        Comment: socially      Drug use: No      Sexual activity: Yes        Partners: Male        Birth control/protection: Tubal Ligation    Other Topics      Concerns:        Caffeine Concern: Yes        Exercise: No          REVIEW OF SYSTEMS:     10 point ROS completed and was negative, except for pertinent positive and negatives stated in subjective. EXAM:   Left lower extremity focused exam:  GENERAL: well developed, well nourished, in no apparent distress  EXTREMITIES:              1. Integument: Skin appears moist, warm, and supple with positive hair growth. There are no color changes. No open lesions. No macerations. No Hyperkeratotic lesions.    2. Vascular: Dorsalis pedis 2/4 bilateral and posterior tibial pulses 2/4 bilateral, capillary refill normal.  3. Neurological: Gross sensation intact via light touch bilaterally. Normal sharp/dull sensation              4. Musculoskeletal: All muscle groups are graded 5/5 in the foot and ankle with mild pain elicited with plantarflexion against resistance. Moderate pain with palpation to plantar aspect of left heel. There is pain with side-to-side heel squeeze, as well as pain with palpation at the insertion of the Achilles tendon. Patient does have adequate plantarflexion strength, negative Vicente test, no current signs of Achilles tendon rupture. Some decreased ankle joint dorsiflexion with the knee extended, which slightly improves with the knee flexed consistent with equinus deformity      ASSESSMENT AND PLAN:   Diagnoses and all orders for this visit:    Plantar fasciitis of left foot    Achilles tendinitis of left lower extremity    Contusion of bone    Gastrocnemius equinus of left lower extremity    Pain of left heel        Plan:   -Patient was seen and evaluated today in clinic. Discussed with patient that she does appear to have multiple issues going on within the left heel. A lot of her pain is elicited with palpation to plantar medial and central aspects of the left heel, consistent with planter fasciitis. Patient also has pain to the insertion of the Achilles tendon, consistent with insertional Achilles tendinitis. Patient also has pain with side-to-side heel squeeze, concerning for contusion of the heel heel bone.    -Patient's previous radiographs show no acute osseous abnormalities within the left calcaneus.    -Discussed further options with patient today. I would recommend patient start nonweightbearing to the left foot at this time as she does state she has some minor discomfort when ambulating in the boot. Advised patient to utilize a knee scooter for ambulation assistance.    -Also recommend an injection into the origin of plantar fascia today as a lot of her pain on exam is coming from this location.   Discussed the procedure in detail and patient is agreeable to the injection today. Plantar fascia injection (CPT: 88362) left lower extremity  -Discussed steroid injection with patient including benefits and risks. Patient agrees to injection and written consent was obtained. -After prepping site with alcohol, administered steroid injection to left plantar heel consisting of 1 cc of 0.5% Marcaine plain, 1 cc of 1% lidocaine plain, and 1 cc of Kenalog 40. Patient tolerated the injection well and there were no complications. Site was dressed with Band-Aid.    -Patient will continue with meloxicam as prescribed. -Provided with a work note today.    -The patient indicates understanding of these issues and agrees to the plan. Time spent reviewing pertinent information from patient's chart, reviewing any pertinent imaging, obtaining history and physical exam, and discussing and mutually agreeing on a treatment plan: 25 minutes    RTC 3 weeks      Thurman Opitz, ZACHARY [facility]        11/1/2023    Dragon speech recognition software was used to prepare this note. Errors in word recognition may occur. Please contact me with any questions/concerns with this note.

## 2023-11-02 NOTE — PROCEDURES
Verbal order per Dr John Nieves to draw up, 1ml of Lidocaine 1%, 1ml of Marcaine and 1ml of Kenalog 40 for  left foot cortisone injection.

## 2023-11-03 RX ORDER — TRIAMCINOLONE ACETONIDE 40 MG/ML
40 INJECTION, SUSPENSION INTRA-ARTICULAR; INTRAMUSCULAR ONCE
Status: SHIPPED | OUTPATIENT
Start: 2023-11-03

## 2023-11-07 ENCOUNTER — TELEPHONE (OUTPATIENT)
Facility: LOCATION | Age: 53
End: 2023-11-07

## 2023-11-07 NOTE — TELEPHONE ENCOUNTER
Pt called forms dept, wanted to know if we had received her forms yesterday. Informed pt we have not received anything from providers office. Pt to call and check with them.

## 2023-11-07 NOTE — TELEPHONE ENCOUNTER
Patient seen on 11/1/23. Dr Deedee Calvert or Kelvin Buck- did patient drop off any forms at last appointment?     Please advise

## 2023-11-08 ENCOUNTER — PATIENT MESSAGE (OUTPATIENT)
Dept: ADMINISTRATIVE | Age: 53
End: 2023-11-08

## 2023-11-08 NOTE — TELEPHONE ENCOUNTER
Patient presents to ED c/o left sided chest pain and palpations since this morning while at rest.   + dizziness      Denies sob  Hx of Afib, no thinners   Received disab paperwork and logged for processing.  Sent mychart for hipaa

## 2023-11-08 NOTE — TELEPHONE ENCOUNTER
S/w pt and she states she dropped of disability paperwork on 11/6 at [facility]. [facility] and [facility] states she rc'd the paperwork and faxed it to forms on 11/7. Called forms and s/w Roxie Mathur who confirmed she rc'd the fax. Pt notified that forms has the paperwork and advised to f/u with them r/t timeline of when it will be ready. She had no further q's.

## 2023-11-13 ENCOUNTER — TELEPHONE (OUTPATIENT)
Facility: LOCATION | Age: 53
End: 2023-11-13

## 2023-11-13 NOTE — TELEPHONE ENCOUNTER
Patient was told by Dr. Mohan Meraz to call and f/u on auth for lt leg venaseal procedure from 10/3 office visit. I sent chat msg to sx  to f/u after seeing no auth yet.

## 2023-11-15 ENCOUNTER — OFFICE VISIT (OUTPATIENT)
Facility: LOCATION | Age: 53
End: 2023-11-15
Payer: COMMERCIAL

## 2023-11-15 DIAGNOSIS — M79.672 PAIN OF LEFT HEEL: ICD-10-CM

## 2023-11-15 DIAGNOSIS — G57.52 TARSAL TUNNEL SYNDROME, LEFT LOWER LIMB: ICD-10-CM

## 2023-11-15 DIAGNOSIS — M76.62 ACHILLES TENDINITIS OF LEFT LOWER EXTREMITY: ICD-10-CM

## 2023-11-15 DIAGNOSIS — M62.462 GASTROCNEMIUS EQUINUS OF LEFT LOWER EXTREMITY: ICD-10-CM

## 2023-11-15 DIAGNOSIS — M72.2 PLANTAR FASCIITIS OF LEFT FOOT: Primary | ICD-10-CM

## 2023-11-15 DIAGNOSIS — T14.8XXA CONTUSION OF BONE: ICD-10-CM

## 2023-11-15 NOTE — PROGRESS NOTES
[facility] Podiatry  Progress Note    Sae Billingsley is a 52year old female. Chief Complaint   Patient presents with    Foot Pain     Patient is here to follow up on left foot pain. She is using a knee scooter and wearing cam boot on left foot. She is still having pain in the heel. She states that the cortisone injection at [address] [place] [address] 11/1/23 helped the plantar fasciitis. She has pain with the boot, 7/10. HPI:     Patient is a pleasant 28-year-old female who is returning to clinic today for recheck of left foot. Patient states that she is still having pain to the left heel. She has been in a cam boot utilizing a knee scooter. She did receive a cortisone injection into her left heel at last visit. She states that that minimally relieved her pain. Currently rates her pain 7/10 and states that the pain is surrounding her left heel. Patient also has a new complaint of tingling on the inside of her left ankle. This is new since her previous visit. She relates random shocking sensations on the inside of her left ankle. Denies recent injury or other pedal complaints today. She is here today for further evaluation and care. Denies recent nausea, vomiting, fever, chills. Allergies: Patient has no known allergies. Current Outpatient Medications   Medication Sig Dispense Refill    Meloxicam 7.5 MG Oral Tab Take 1 tablet (7.5 mg total) by mouth daily. 30 tablet 0    VALACYCLOVIR 500 MG Oral Tab TAKE ONE TABLET BY MOUTH ONE TIME DAILY 90 tablet 0    topiramate 25 MG Oral Tab Take 3 tablets (75 mg total) by mouth 2 (two) times daily. 180 tablet 5    PARoxetine 20 MG Oral Tab Take 1 tablet (20 mg total) by mouth every morning. 90 tablet 3    triamcinolone 0.1 % External Cream Apply topically 2 (two) times daily as needed. 60 g 1    SUMAtriptan Succinate 100 MG Oral Tab Take 1 tablet (100 mg total) by mouth every 2 (two) hours as needed for Migraine.  9 tablet 5    hydrochlorothiazide 12.5 MG Oral Tab Take 1 tablet (12.5 mg total) by mouth daily as needed. 90 tablet 1    HYDROcodone-acetaminophen 5-325 MG Oral Tab Take 1 tablet by mouth every 6 (six) hours as needed for Pain. (Patient not taking: Reported on 11/1/2023) 15 tablet 0      Past Medical History:   Diagnosis Date    Abnormal uterine bleeding     Amenorrhea     Decorative tattoo     Depression     Headache(784.0)     migraine    Human papilloma virus infection     Migraines       Past Surgical History:   Procedure Laterality Date    COLPOSCOPY, CERVIX W/UPPER ADJACENT VAGINA; W/BIOPSY(S), CERVIX  2002      Family History   Problem Relation Age of Onset    Heart Attack Father         Heart Attack    Breast Cancer Maternal Grandmother 67    Neurological Disorder Mother         Alzheimers      Social History     Socioeconomic History    Marital status:    Tobacco Use    Smoking status: Former     Packs/day: 1.00     Years: 22.00     Additional pack years: 0.00     Total pack years: 22.00     Types: Cigarettes     Quit date: 5/15/2013     Years since quitting: 10.5    Smokeless tobacco: Never   Vaping Use    Vaping Use: Never used   Substance and Sexual Activity    Alcohol use: Yes     Comment: socially    Drug use: No    Sexual activity: Yes     Partners: Male     Birth control/protection: Tubal Ligation   Other Topics Concern    Caffeine Concern Yes    Exercise No           REVIEW OF SYSTEMS:     10 point ROS completed and was negative, except for pertinent positive and negatives stated in subjective. EXAM:   Left lower extremity focused exam:  GENERAL: well developed, well nourished, in no apparent distress  EXTREMITIES:              1. Integument: Skin appears moist, warm, and supple with positive hair growth. There are no color changes. No open lesions. No macerations. No Hyperkeratotic lesions.    2. Vascular: Dorsalis pedis 2/4 bilateral and posterior tibial pulses 2/4 bilateral, capillary refill normal.  3. Neurological: Gross sensation intact via light touch bilaterally. Positive Tinel's sign to tarsal tunnel of left lower extremity. Symptoms elicited with direct compression of tarsal tunnel, left. Paresthesias are present to the medial ankle and foot. 4. Musculoskeletal: All muscle groups are graded 5/5 in the foot and ankle with mild pain elicited with plantarflexion against resistance. Moderate pain with palpation to plantar medial aspect of left heel. There is pain with side-to-side heel squeeze, as well as pain with palpation at the insertion of the Achilles tendon. Patient does have adequate plantarflexion strength, negative Vicente test, no current signs of Achilles tendon rupture. Some decreased ankle joint dorsiflexion with the knee extended, which slightly improves with the knee flexed consistent with equinus deformity      ASSESSMENT AND PLAN:   Diagnoses and all orders for this visit:    Plantar fasciitis of left foot  -     OP REFERRAL TO [facility]    Achilles tendinitis of left lower extremity  -     OP REFERRAL TO [facility]    Tarsal tunnel syndrome, left lower limb  -     OP REFERRAL TO [facility]    Contusion of bone    Gastrocnemius equinus of left lower extremity    Pain of left heel        Plan:   -Patient was seen and evaluated today in clinic. Patient has been nonweightbearing since her previous appointment and is currently utilizing a knee scooter and cam boot. She continues to have similar symptoms as last visit. Patient does have symptoms consistent with planter fasciitis and Achilles insertional tendinitis of the left lower extremity.    -Discussed further options. At this point, patient has been nonweightbearing in a cam boot and knee scooter. I recommend to patient today that we should start formal physical therapy. I would like for patient to start trying to weight-bear as tolerated in the tall cam boot.   Patient is agreeable to this.    -Referral for formal therapy was ordered today.    -New symptoms today consistent with tarsal tunnel syndrome of the left lower extremity. Discussed etiology and symptoms of condition  Discussed treatment options such as medication, vitamins, injections, neurology referral  Discussed possible neurology referral for EMG vs NCV testing. Answered all questions and patient verbalized understanding. Discussed the role and potential of steroid injection as both a therapeutic and diagnostic modality. Injection series: 1st injection today (11/15/23)  PROCEDURE: 20550  -tarsal tunnel, left lower extremity  After reviewing options for cortisone injection and risks/potential complications, pt has agreed to proceed. Using aseptic technique, injection was then given using a mixture of 1 cc of 1% lidocaine plain, 1 cc of 0.5% Marcaine plain, and 1 cc dexamethasone for the tarsal tunnel using 25 gauge 1.5\" needle. Site of infiltration - tarsal tunnel of left lower extremity  Sites covered with a bandaid  Pt tolerated procedure well and no complications encountered. Pt instructed on icing and potential for steroid flair.    -We will consider advanced imaging studies of patient's symptoms do not improve    -The patient indicates understanding of these issues and agrees to the plan. Time spent reviewing pertinent information from patient's chart, reviewing any pertinent imaging, obtaining history and physical exam, and discussing and mutually agreeing on a treatment plan: 20 minutes    RTC 4 weeks      ALPESH Carrillo [facility]        11/15/2023    Dragon speech recognition software was used to prepare this note. Errors in word recognition may occur. Please contact me with any questions/concerns with this note.

## 2023-11-16 NOTE — TELEPHONE ENCOUNTER
Called patient to get phone # from insurance card to call for pre authorization since the card is not scanned in her chart. She was very upset due to the provider telling her we would call her in two weeks. I informed her pre authorization can take up to 8 weeks and that I will be calling her insurance co this afternoon to start the process. I informed her I will contact her by my chart regarding approval or if she is denied for her varicose vein injections. She understood.

## 2023-11-16 NOTE — TELEPHONE ENCOUNTER
Contacted [employer] [place] regarding pre auth for CPT [address] at [place] stated send prior Jermaine Dust form to [facility] of [address] fax # with office notes. She stated it will take 14 business days for all preauthorizations to be reviewed. Sent prior auth request and office notes today.

## 2023-11-17 RX ORDER — DEXAMETHASONE SODIUM PHOSPHATE 10 MG/ML
10 INJECTION INTRAMUSCULAR; INTRAVENOUS ONCE
Status: SHIPPED | OUTPATIENT
Start: 2023-11-17

## 2023-11-18 DIAGNOSIS — G43.709 CHRONIC MIGRAINE WITHOUT AURA WITHOUT STATUS MIGRAINOSUS, NOT INTRACTABLE: ICD-10-CM

## 2023-11-20 RX ORDER — TOPIRAMATE 25 MG/1
75 TABLET ORAL 2 TIMES DAILY
Qty: 180 TABLET | Refills: 0 | Status: SHIPPED | OUTPATIENT
Start: 2023-11-20

## 2023-11-21 ENCOUNTER — TELEPHONE (OUTPATIENT)
Dept: PHYSICAL THERAPY | Facility: HOSPITAL | Age: 53
End: 2023-11-21

## 2023-11-27 ENCOUNTER — OFFICE VISIT (OUTPATIENT)
Facility: LOCATION | Age: 53
End: 2023-11-27
Attending: PODIATRIST
Payer: COMMERCIAL

## 2023-11-27 ENCOUNTER — TELEPHONE (OUTPATIENT)
Facility: LOCATION | Age: 53
End: 2023-11-27

## 2023-11-27 DIAGNOSIS — G57.52 TARSAL TUNNEL SYNDROME, LEFT LOWER LIMB: ICD-10-CM

## 2023-11-27 DIAGNOSIS — M72.2 PLANTAR FASCIITIS OF LEFT FOOT: Primary | ICD-10-CM

## 2023-11-27 DIAGNOSIS — M76.62 ACHILLES TENDINITIS OF LEFT LOWER EXTREMITY: ICD-10-CM

## 2023-11-27 PROCEDURE — 97110 THERAPEUTIC EXERCISES: CPT

## 2023-11-27 PROCEDURE — 97161 PT EVAL LOW COMPLEX 20 MIN: CPT

## 2023-11-27 NOTE — TELEPHONE ENCOUNTER
Informed patient by my chart we received approval for varicose vein injections and she should call our office to make an appointment.  Auth # [id number],

## 2023-11-28 DIAGNOSIS — B00.1 RECURRENT COLD SORES: ICD-10-CM

## 2023-11-29 RX ORDER — VALACYCLOVIR HYDROCHLORIDE 500 MG/1
500 TABLET, FILM COATED ORAL DAILY
Qty: 90 TABLET | Refills: 1 | Status: SHIPPED | OUTPATIENT
Start: 2023-11-29

## 2023-11-30 ENCOUNTER — OFFICE VISIT (OUTPATIENT)
Facility: LOCATION | Age: 53
End: 2023-11-30
Attending: PODIATRIST
Payer: COMMERCIAL

## 2023-11-30 PROCEDURE — 97140 MANUAL THERAPY 1/> REGIONS: CPT

## 2023-11-30 PROCEDURE — 97110 THERAPEUTIC EXERCISES: CPT

## 2023-11-30 NOTE — PROGRESS NOTES
Diagnosis:    Plantar fasciitis of left foot (M72.2)  Achilles tendinitis of left lower extremity (M76.62)  Tarsal tunnel syndrome, left lower limb (G57.52)      Referring Provider: Janine Patel  Date of Evaluation:    11/27/23    Precautions:  None Next MD visit:   none scheduled  Date of Surgery: n/a   Insurance Primary/Secondary: [address] [place] [address] / N/A     # Auth Visits: 10            Subjective: has tingling in the foot when attempting to put pressure through the foot especially the heel on the ground    Pain: 7/10      Objective: see flow sheet      Assessment:   Demonstrates myofascial restrictions throughout proximal & medial calf, heel, & PF  Fair tolerance to IASTM. Tightness throughout posterior compartment of lower leg. Less palpable tenderness noted post STM      Goals:   Pt will demonstrate improved DF AROM to >= 10 degrees to promote proper foot clearance during gait and greater ease descending stairs without compensation  Pt will have increased ankle strength to at least 4/5 throughout for improved ankle control with gait and stair negotiation   Pt will be able to transition to shoe and demonstrate improvement in heel to toe walking pattern with no more than 4/10 pain  Pt will perform all ADLs & light household tasks with more ease  Pt will verbalize and demonstrate being able to perform step up and down with no more than 4/10 pain  Pt will demonstrate improved calf flexibility in order to optimize walking pattern and to decrease symptoms associated with weight bearing activity   Pt will demonstrate full AROM (L) ankle in all planes without pain  Pt will be independent and compliant with comprehensive HEP to maintain progress achieved in PT     Plan: improve ankle/foot mobility, flexibility; re-assess to see if taping assisted with symptom reduction   Date: 11/30/2023  TX#: 2/10 Date:                 TX#: 3/ Date:                 TX#: 4/ Date:                 TX#: 5/ Date:    Tx#: 6/   Manual Therapy (30 min)       STM/MFR gastroc & soleus, posterior tib  IASTM proximal/medial calf, heel, PF       TherEx (10 min)       Long sitting gastroc stretch w/strap 1 min x 3       Long sitting soleus stretch w/srap 1 min x 3       Seated PF release with ball 3 min       Taping to improve arch support and unload medial aspect of ankle              HEP:   Access Code: [email or web address]  URL: [email or web address]  Date: 11/27/2023  Prepared by: Hank Rose    Exercises  - Long Sitting Calf Stretch with Strap  - 2 x daily - 7 x weekly - 3 sets - 30-60 seconds hold  - Long Sitting Soleus Stretch on Bolster with Strap  - 2 x daily - 7 x weekly - 3 sets - 30-60 seconds hold  - Seated Ankle Circles  - 2 x daily - 7 x weekly - 3 sets - 10 reps  - Seated Plantar Fascia Mobilization with Small Ball  - 2 x daily - 7 x weekly - 1 sets - 3 min hold      Charges: Manual 2;  TherEx 1       Total Timed Treatment: 40 min  Total Treatment Time: 40 min

## 2023-12-05 ENCOUNTER — OFFICE VISIT (OUTPATIENT)
Facility: LOCATION | Age: 53
End: 2023-12-05
Attending: PODIATRIST
Payer: COMMERCIAL

## 2023-12-05 PROCEDURE — 97110 THERAPEUTIC EXERCISES: CPT

## 2023-12-05 PROCEDURE — 97140 MANUAL THERAPY 1/> REGIONS: CPT

## 2023-12-05 NOTE — PROGRESS NOTES
Diagnosis:    Plantar fasciitis of left foot (M72.2)  Achilles tendinitis of left lower extremity (M76.62)  Tarsal tunnel syndrome, left lower limb (G57.52)      Referring Provider: Ilya Forde  Date of Evaluation:    11/27/23    Precautions:  None Next MD visit:   none scheduled  Date of Surgery: n/a   Insurance Primary/Secondary: [address] [place] [address] / N/A     # Auth Visits: 10            Subjective: weaned to a shoe but still painful; tape did see to help with less tingling when weight bearing     Pain: 6/10      Objective: see flow sheet      Assessment:   Continued myofascial restrictions and tightness in the calf musculature, posterior tib  Very irritable to plantar surface of heel as well as achilles insertion  Unable to tolerate weight bearing very well.  Encouraged continued exercise and self soft tissue treatment       Goals:   Pt will demonstrate improved DF AROM to >= 10 degrees to promote proper foot clearance during gait and greater ease descending stairs without compensation  Pt will have increased ankle strength to at least 4/5 throughout for improved ankle control with gait and stair negotiation   Pt will be able to transition to shoe and demonstrate improvement in heel to toe walking pattern with no more than 4/10 pain  Pt will perform all ADLs & light household tasks with more ease  Pt will verbalize and demonstrate being able to perform step up and down with no more than 4/10 pain  Pt will demonstrate improved calf flexibility in order to optimize walking pattern and to decrease symptoms associated with weight bearing activity   Pt will demonstrate full AROM (L) ankle in all planes without pain  Pt will be independent and compliant with comprehensive HEP to maintain progress achieved in PT     Plan: improve ankle/foot mobility, flexibility; re-assess to see if taping assisted with symptom reduction   Date: 11/30/2023  TX#: 2/10 Date: 12/5/23               TX#: 3/10 Date:                 TX#: 4/ Date: TX#: 5/ Date: Tx#: 6/   Manual Therapy (30 min) Manual Therapy (30 min)      STM/MFR gastroc & soleus, posterior tib  IASTM proximal/medial calf, heel, PF STM/MFR gastroc & soleus, posterior tib  IASTM proximal/medial calf, heel, PF      TherEx (10 min) TherEx (15 min)      Long sitting gastroc stretch w/strap 1 min x 3 Long sitting gastroc stretch w/strap 1 min x 3      Long sitting soleus stretch w/srap 1 min x 3 Long sitting soleus stretch w/strap 30 sec x 3      Seated PF release with ball 3 min Seated PF release w/lacross ball x 3 min      Taping to improve arch support and unload medial aspect of ankle NuStep x 6 min       Standing gastroc stretch (increased heel pain) 30 sec x 2      HEP:   Access Code: [email or web address]  URL: [email or web address]  Date: 11/27/2023  Prepared by: Avery Latus    Exercises  - Long Sitting Calf Stretch with Strap  - 2 x daily - 7 x weekly - 3 sets - 30-60 seconds hold  - Long Sitting Soleus Stretch on Bolster with Strap  - 2 x daily - 7 x weekly - 3 sets - 30-60 seconds hold  - Seated Ankle Circles  - 2 x daily - 7 x weekly - 3 sets - 10 reps  - Seated Plantar Fascia Mobilization with Small Ball  - 2 x daily - 7 x weekly - 1 sets - 3 min hold      Charges: Manual 2;  TherEx 1       Total Timed Treatment: 45 min  Total Treatment Time: 45 min

## 2023-12-07 ENCOUNTER — OFFICE VISIT (OUTPATIENT)
Facility: LOCATION | Age: 53
End: 2023-12-07
Attending: PODIATRIST
Payer: COMMERCIAL

## 2023-12-07 PROCEDURE — 97110 THERAPEUTIC EXERCISES: CPT

## 2023-12-07 PROCEDURE — 97140 MANUAL THERAPY 1/> REGIONS: CPT

## 2023-12-07 NOTE — PROGRESS NOTES
Diagnosis:    Plantar fasciitis of left foot (M72.2)  Achilles tendinitis of left lower extremity (M76.62)  Tarsal tunnel syndrome, left lower limb (G57.52)      Referring Provider: Bharathi Donald  Date of Evaluation:    11/27/23    Precautions:  None Next MD visit:   none scheduled  Date of Surgery: n/a   Insurance Primary/Secondary: [address] [place] [address] N/A     # Auth Visits: 10            Subjective: taping does help decrease pain and tingling. If not wearing tape, all her pain and symptoms come back     Pain: 7/10      Objective: see flow sheet      Assessment:   Myofascial restrictions and tightness proximal & medial gastroc, soleus tightness  Tenderness noted distal achilles, medial and lateral heel  Less hypersensitivity noted with palpation but still very painful. Taping does provide some relief. Discussed potential orthotics as she has used them in the past for PF on the (R) side. Follow up with physician next week.   Not very good tolerance for attempted weightbearing activity       Goals:   Pt will demonstrate improved DF AROM to >= 10 degrees to promote proper foot clearance during gait and greater ease descending stairs without compensation  Pt will have increased ankle strength to at least 4/5 throughout for improved ankle control with gait and stair negotiation   Pt will be able to transition to shoe and demonstrate improvement in heel to toe walking pattern with no more than 4/10 pain  Pt will perform all ADLs & light household tasks with more ease  Pt will verbalize and demonstrate being able to perform step up and down with no more than 4/10 pain  Pt will demonstrate improved calf flexibility in order to optimize walking pattern and to decrease symptoms associated with weight bearing activity   Pt will demonstrate full AROM (L) ankle in all planes without pain  Pt will be independent and compliant with comprehensive HEP to maintain progress achieved in PT     Plan: improve ankle/foot mobility, flexibility; re-assess to see if taping assisted with symptom reduction   Date: 11/30/2023  TX#: 2/10 Date: 12/5/23               TX#: 3/10 Date: 12/7/23                TX#: 4/10 Date:                 TX#: 5/ Date: Tx#: 6/   Manual Therapy (30 min) Manual Therapy (30 min) Manual Therapy (30 min)     STM/MFR gastroc & soleus, posterior tib  IASTM proximal/medial calf, heel, PF STM/MFR gastroc & soleus, posterior tib  IASTM proximal/medial calf, heel, PF STM/MFR gastroc & soleus, posterior tib  IASTM proximal/medial calf, heel, PF     TherEx (10 min) TherEx (15 min) TherEx (10 min)     Long sitting gastroc stretch w/strap 1 min x 3 Long sitting gastroc stretch w/strap 1 min x 3 Long sitting gastroc stretch w/strap 1 min x 3     Long sitting soleus stretch w/srap 1 min x 3 Long sitting soleus stretch w/strap 30 sec x 3 Long sitting soleus stretch w/strap 30 sec x 3     Seated PF release with ball 3 min Seated PF release w/lacross ball x 3 min Seated PF release w/lacross ball x 3 min     Taping to improve arch support and unload medial aspect of ankle NuStep x 6 min Taping to improve arch support and unload medial aspect of ankle       Standing gastroc stretch (increased heel pain) 30 sec x 2      HEP:   Access Code: [email or web address]  URL: [email or web address]  Date: 11/27/2023  Prepared by: Rusty Mccormick    Exercises  - Long Sitting Calf Stretch with Strap  - 2 x daily - 7 x weekly - 3 sets - 30-60 seconds hold  - Long Sitting Soleus Stretch on Bolster with Strap  - 2 x daily - 7 x weekly - 3 sets - 30-60 seconds hold  - Seated Ankle Circles  - 2 x daily - 7 x weekly - 3 sets - 10 reps  - Seated Plantar Fascia Mobilization with Small Ball  - 2 x daily - 7 x weekly - 1 sets - 3 min hold      Charges: Manual 2;  TherEx 1       Total Timed Treatment: 40 min  Total Treatment Time: 40 min

## 2023-12-12 ENCOUNTER — OFFICE VISIT (OUTPATIENT)
Facility: LOCATION | Age: 53
End: 2023-12-12
Attending: PODIATRIST
Payer: COMMERCIAL

## 2023-12-12 PROCEDURE — 97110 THERAPEUTIC EXERCISES: CPT

## 2023-12-12 PROCEDURE — 97140 MANUAL THERAPY 1/> REGIONS: CPT

## 2023-12-12 NOTE — PROGRESS NOTES
Diagnosis:    Plantar fasciitis of left foot (M72.2)  Achilles tendinitis of left lower extremity (M76.62)  Tarsal tunnel syndrome, left lower limb (G57.52)      Referring Provider: Leo Aparicio  Date of Evaluation:    11/27/23    Precautions:  None Next MD visit:   none scheduled  Date of Surgery: n/a   Insurance Primary/Secondary: [address] [place] [address] N/A     # Auth Visits: 10            Subjective: walking and putting more pressure through heel but still feels like walking on glass    Pain: 6/10      Objective: see flow sheet      Assessment:   Myofascial restrictions & tightness (L) gastroc/soleus  Achilles as well as medial and lateral posterior heel tenderness  Fair response to manual treatment  Difficulty progressing exercises due to increased pain, however taping to unload the medial aspect of the ankle does seem to decrease symptoms to a certain extent  Still sensitive to touch medial aspect of ankle         Goals:   Pt will demonstrate improved DF AROM to >= 10 degrees to promote proper foot clearance during gait and greater ease descending stairs without compensation  Pt will have increased ankle strength to at least 4/5 throughout for improved ankle control with gait and stair negotiation   Pt will be able to transition to shoe and demonstrate improvement in heel to toe walking pattern with no more than 4/10 pain  Pt will perform all ADLs & light household tasks with more ease  Pt will verbalize and demonstrate being able to perform step up and down with no more than 4/10 pain  Pt will demonstrate improved calf flexibility in order to optimize walking pattern and to decrease symptoms associated with weight bearing activity   Pt will demonstrate full AROM (L) ankle in all planes without pain  Pt will be independent and compliant with comprehensive HEP to maintain progress achieved in PT     Plan: improve ankle/foot mobility, flexibility; re-assess to see if taping assisted with symptom reduction   Date: 11/30/2023  TX#: 2/10 Date: 12/5/23               TX#: 3/10 Date: 12/7/23                TX#: 4/10 Date: 12/12/23                 TX#: 5/10 Date: Tx#: 6/   Manual Therapy (30 min) Manual Therapy (30 min) Manual Therapy (30 min) Manual Therapy (15 min)    STM/MFR gastroc & soleus, posterior tib  IASTM proximal/medial calf, heel, PF STM/MFR gastroc & soleus, posterior tib  IASTM proximal/medial calf, heel, PF STM/MFR gastroc & soleus, posterior tib  IASTM proximal/medial calf, heel, PF STM/MFR gastroc & soleus, posterior tib    TherEx (10 min) TherEx (15 min) TherEx (10 min) TherEx (15 min)    Long sitting gastroc stretch w/strap 1 min x 3 Long sitting gastroc stretch w/strap 1 min x 3 Long sitting gastroc stretch w/strap 1 min x 3 NuStep x 6 min     Long sitting soleus stretch w/srap 1 min x 3 Long sitting soleus stretch w/strap 30 sec x 3 Long sitting soleus stretch w/strap 30 sec x 3 Long sitting gastroc & soleus stretch 30 sec x 3 each    Seated PF release with ball 3 min Seated PF release w/lacross ball x 3 min Seated PF release w/lacross ball x 3 min Seated towel scrunch x 30    Taping to improve arch support and unload medial aspect of ankle NuStep x 6 min Taping to improve arch support and unload medial aspect of ankle  Taping to unload medial aspect of (L) ankle     Standing gastroc stretch (increased heel pain) 30 sec x 2      HEP:   Access Code: [email or web address]  URL: [email or web address]  Date: 11/27/2023  Prepared by: Aidee Foster    Exercises  - Long Sitting Calf Stretch with Strap  - 2 x daily - 7 x weekly - 3 sets - 30-60 seconds hold  - Long Sitting Soleus Stretch on Bolster with Strap  - 2 x daily - 7 x weekly - 3 sets - 30-60 seconds hold  - Seated Ankle Circles  - 2 x daily - 7 x weekly - 3 sets - 10 reps  - Seated Plantar Fascia Mobilization with Small Ball  - 2 x daily - 7 x weekly - 1 sets - 3 min hold      Charges: Manual 1;  TherEx 1       Total Timed Treatment: 30 min  Total Treatment Time: 30 min

## 2023-12-13 ENCOUNTER — OFFICE VISIT (OUTPATIENT)
Facility: LOCATION | Age: 53
End: 2023-12-13
Payer: COMMERCIAL

## 2023-12-13 DIAGNOSIS — M72.2 PLANTAR FASCIITIS OF LEFT FOOT: ICD-10-CM

## 2023-12-13 DIAGNOSIS — M76.62 ACHILLES TENDINITIS OF LEFT LOWER EXTREMITY: ICD-10-CM

## 2023-12-13 DIAGNOSIS — R20.2 PARESTHESIAS: ICD-10-CM

## 2023-12-13 DIAGNOSIS — M79.672 PAIN OF LEFT HEEL: ICD-10-CM

## 2023-12-13 DIAGNOSIS — M62.462 GASTROCNEMIUS EQUINUS OF LEFT LOWER EXTREMITY: ICD-10-CM

## 2023-12-13 DIAGNOSIS — G57.52 TARSAL TUNNEL SYNDROME, LEFT LOWER LIMB: Primary | ICD-10-CM

## 2023-12-13 PROCEDURE — 99213 OFFICE O/P EST LOW 20 MIN: CPT | Performed by: PODIATRIST

## 2023-12-13 NOTE — PROGRESS NOTES
[id number] [facility]  Progress Note    Senthil Martinez is a 52year old female. Chief Complaint   Patient presents with    Foot Pain     Left plantar fasciitis, patient is doing physical therapy. Therapist has taped her foot/ankle which helps, but the tape is causing irritation. She still has the tingling sensation in the heel when she is weightbearing. She is now getting discoloration in the medial side of ankle. She is not able to sleep at night due to the pain. Cortisone injection LOV 11/15/23         HPI:     Patient is a pleasant 52-year-old female who is returning to clinic today for recheck of left foot. Patient is currently weightbearing in supportive shoe gear and does have KT tape on her left foot. She is still in formal therapy. Unfortunately, patient has not received relief in pain/tingling with therapy. She notices continued tingling in the heel especially when weightbearing. She also states that the pain is affecting her sleep. Patient did have a tarsal tunnel injection at her last visit, and states that she did get some benefit, but the discomfort and tingling returned. Denies recent injuries or other pedal complaints at this time and is here for further evaluation and care. Allergies: Patient has no known allergies. Current Outpatient Medications   Medication Sig Dispense Refill    valACYclovir 500 MG Oral Tab Take 1 tablet (500 mg total) by mouth daily. 90 tablet 1    topiramate 25 MG Oral Tab Take 3 tablets (75 mg total) by mouth 2 (two) times daily. 180 tablet 0    PARoxetine 20 MG Oral Tab Take 1 tablet (20 mg total) by mouth every morning. 90 tablet 3    triamcinolone 0.1 % External Cream Apply topically 2 (two) times daily as needed. 60 g 1    SUMAtriptan Succinate 100 MG Oral Tab Take 1 tablet (100 mg total) by mouth every 2 (two) hours as needed for Migraine. 9 tablet 5    hydrochlorothiazide 12.5 MG Oral Tab Take 1 tablet (12.5 mg total) by mouth daily as needed.  80 tablet 1    HYDROcodone-acetaminophen 5-325 MG Oral Tab Take 1 tablet by mouth every 6 (six) hours as needed for Pain. (Patient not taking: Reported on 12/13/2023) 15 tablet 0      Past Medical History:   Diagnosis Date    Abnormal uterine bleeding     Amenorrhea     Decorative tattoo     Depression     Headache(784.0)     migraine    Human papilloma virus infection     Migraines       Past Surgical History:   Procedure Laterality Date    COLPOSCOPY, CERVIX W/UPPER ADJACENT VAGINA; W/BIOPSY(S), CERVIX  2002      Family History   Problem Relation Age of Onset    Heart Attack Father         Heart Attack    Breast Cancer Maternal Grandmother 67    Neurological Disorder Mother         Alzheimers      Social History     Socioeconomic History    Marital status:    Tobacco Use    Smoking status: Former     Packs/day: 1.00     Years: 22.00     Additional pack years: 0.00     Total pack years: 22.00     Types: Cigarettes     Quit date: 5/15/2013     Years since quitting: 10.5    Smokeless tobacco: Never   Vaping Use    Vaping Use: Never used   Substance and Sexual Activity    Alcohol use: Yes     Comment: socially    Drug use: No    Sexual activity: Yes     Partners: Male     Birth control/protection: Tubal Ligation   Other Topics Concern    Caffeine Concern Yes    Exercise No           REVIEW OF SYSTEMS:     10 point ROS completed and was negative, except for pertinent positive and negatives stated in subjective. EXAM:   Left lower extremity focused exam:  GENERAL: well developed, well nourished, in no apparent distress  EXTREMITIES:              1. Integument: Skin appears moist, warm, and supple with positive hair growth. There are no color changes. No open lesions. No macerations. No Hyperkeratotic lesions. 2. Vascular: Dorsalis pedis 2/4 bilateral and posterior tibial pulses 2/4 bilateral, capillary refill normal.  3. Neurological: Gross sensation intact via light touch bilaterally.   Positive Tinel's sign to tarsal tunnel of left lower extremity. Symptoms elicited with direct compression of tarsal tunnel, left. Paresthesias are present to the medial ankle and plantar heel. 4. Musculoskeletal: All muscle groups are graded 5/5 in the foot and ankle with mild pain elicited with plantarflexion against resistance. Moderate pain with palpation to plantar medial aspect of left heel. There is no pain with side-to-side heel squeeze. There is pain with palpation at the insertion of the Achilles tendon. Patient does have adequate plantarflexion strength, negative Vicente test, no current signs of Achilles tendon rupture. Some decreased ankle joint dorsiflexion with the knee extended, which slightly improves with the knee flexed consistent with equinus deformity         ASSESSMENT AND PLAN:   Diagnoses and all orders for this visit:    Tarsal tunnel syndrome, left lower limb  -     US ANKLE LEFT COMPLETE ([id number]); Future    Plantar fasciitis of left foot  -     US ANKLE LEFT COMPLETE ([id number]); Future    Achilles tendinitis of left lower extremity  -     US ANKLE LEFT COMPLETE ([id number]); Future    Gastrocnemius equinus of left lower extremity  -     US ANKLE LEFT COMPLETE ([id number]); Future    Pain of left heel  -     US ANKLE LEFT COMPLETE ([id number]); Future    Paresthesias  -     US ANKLE LEFT COMPLETE ([id number]); Future        Plan:   -Patient was seen and evaluated today in clinic. She is unfortunately still experiencing the same amount of pain as her previous appointment. The symptoms continue to consist of signs of plantars fasciitis, Achilles insertional tendinitis, and tarsal tunnel syndrome of the left lower extremity.    -Patient has been in formal physical therapy and unfortunately has not noticed improvements. She also continues to utilize supportive shoe gear, as well as trying KT taping. She did have a steroid injection into the tarsal tunnel at her last visit.   She relates short-term relief.    -Discussed further treatment options with patient today. At this point, patient has not responded to nonweightbearing, protected weightbearing, physical therapy, injections, and activity modifications.    -I recommended obtaining an ultrasound of the left ankle to evaluate all of the soft tissues. An order was placed for patient to go to [address] [place] [address] to receive an ultrasound from Dr. Lyly White.    -Following ultrasound results, we will discuss further treatment options. In the meantime, recommend continued supportive shoe gear. I advised patient to discontinue physical therapy if she believes she is not getting improvements.    -Recommend resting, icing, and elevating her left lower extremity.    -Patient will take OTC anti-inflammatories as needed    -The patient indicates understanding of these issues and agrees to the plan. Time spent reviewing pertinent information from patient's chart, reviewing any pertinent imaging, obtaining history and physical exam, and discussing and mutually agreeing on a treatment plan: 20 minutes    RTC after ultrasound      Lauren Fitzgerald        12/13/2023    Dragon speech recognition software was used to prepare this note. Errors in word recognition may occur. Please contact me with any questions/concerns with this note.

## 2023-12-14 ENCOUNTER — APPOINTMENT (OUTPATIENT)
Facility: LOCATION | Age: 53
End: 2023-12-14
Attending: PODIATRIST
Payer: COMMERCIAL

## 2023-12-20 RX ORDER — PAROXETINE HYDROCHLORIDE 20 MG/1
20 TABLET, FILM COATED ORAL EVERY MORNING
Qty: 90 TABLET | Refills: 0 | Status: SHIPPED | OUTPATIENT
Start: 2023-12-20

## 2023-12-20 NOTE — TELEPHONE ENCOUNTER
Medication(s) to Refill:   Requested Prescriptions     Pending Prescriptions Disp Refills    PAROXETINE 20 MG Oral Tab [Pharmacy Med Name: Paroxetine Hydrochloride 20 Mg Tab Nort] 90 tablet 0     Sig: Take 1 tablet (20 mg total) by mouth every morning.          Reason for Medication Refill being sent to Provider / Reason Protocol Failed:  [] 90 day refill has already been granted  [] Blood Pressure out of range  [] Labs Abnormal/over due  [] Medication not previously prescribed by Provider  [] Non-Protocol Medication  [] Controlled Substance   [] Due for appointment- no future appointment scheduled  [] No Follow up specified      Last Time Medication was Filled:  12/2/22      Last Office Visit with PCP: 1/10/23    When Patient was Due Back to the Office:    (from when PCP last addressed condition)    Future Appointments:  Future Appointments   Date Time Provider Lia May   1/23/2024  8:00 AM April Hancock DPM [facility]         Last Blood Pressures:  BP Readings from Last 2 Encounters:   05/26/23 127/67   01/10/23 110/70       Action taken:  [] Refill approved per protocol  [] Routing to provider for approval

## 2023-12-21 DIAGNOSIS — G43.709 CHRONIC MIGRAINE WITHOUT AURA WITHOUT STATUS MIGRAINOSUS, NOT INTRACTABLE: ICD-10-CM

## 2023-12-21 RX ORDER — TOPIRAMATE 25 MG/1
75 TABLET ORAL 2 TIMES DAILY
Qty: 540 TABLET | Refills: 3 | Status: SHIPPED | OUTPATIENT
Start: 2023-12-21 | End: 2024-12-15

## 2023-12-21 NOTE — TELEPHONE ENCOUNTER
Medication(s) to Refill:   Requested Prescriptions     Pending Prescriptions Disp Refills    TOPIRAMATE 25 MG Oral Tab [Pharmacy Med Name: Topiramate 25 Mg Tab Cipl] 180 tablet 0     Sig: TAKE THREE TABLETS BY MOUTH TWO TIMES A DAY         Reason for Medication Refill being sent to Provider / Reason Protocol Failed:  [] 90 day refill has already been granted  [] Blood Pressure out of range  [] Labs Abnormal/over due  [] Medication not previously prescribed by Provider  [] Non-Protocol Medication  [] Controlled Substance   [] Due for appointment- no future appointment scheduled  [] No Follow up specified      Last Time Medication was Filled:  11/20/23      Last Office Visit with PCP: 1/10/23    When Patient was Due Back to the Office:    (from when PCP last addressed condition)    Future Appointments:  Future Appointments   Date Time Provider Lia May   1/23/2024  8:00 AM Carola Caputo DPM [facility]         Last Blood Pressures:  BP Readings from Last 2 Encounters:   05/26/23 127/67   01/10/23 110/70         Action taken:  [] Refill approved per protocol  [] Routing to provider for approval

## 2024-01-23 ENCOUNTER — OFFICE VISIT (OUTPATIENT)
Facility: LOCATION | Age: 54
End: 2024-01-23
Payer: COMMERCIAL

## 2024-01-23 ENCOUNTER — TELEPHONE (OUTPATIENT)
Dept: FAMILY MEDICINE CLINIC | Facility: CLINIC | Age: 54
End: 2024-01-23

## 2024-01-23 DIAGNOSIS — R60.0 BILATERAL LOWER EXTREMITY EDEMA: ICD-10-CM

## 2024-01-23 DIAGNOSIS — M62.462 GASTROCNEMIUS EQUINUS OF LEFT LOWER EXTREMITY: ICD-10-CM

## 2024-01-23 DIAGNOSIS — M79.672 PAIN OF LEFT HEEL: ICD-10-CM

## 2024-01-23 DIAGNOSIS — M72.2 PLANTAR FASCIITIS OF LEFT FOOT: ICD-10-CM

## 2024-01-23 DIAGNOSIS — R20.2 PARESTHESIAS: ICD-10-CM

## 2024-01-23 DIAGNOSIS — T14.8XXA CONTUSION OF BONE: ICD-10-CM

## 2024-01-23 DIAGNOSIS — M79.672 LEFT FOOT PAIN: ICD-10-CM

## 2024-01-23 DIAGNOSIS — M76.62 ACHILLES TENDINITIS OF LEFT LOWER EXTREMITY: ICD-10-CM

## 2024-01-23 DIAGNOSIS — G57.52 TARSAL TUNNEL SYNDROME, LEFT LOWER LIMB: Primary | ICD-10-CM

## 2024-01-23 PROCEDURE — 99213 OFFICE O/P EST LOW 20 MIN: CPT | Performed by: PODIATRIST

## 2024-01-23 RX ORDER — MELOXICAM 7.5 MG/1
7.5 TABLET ORAL DAILY
Qty: 30 TABLET | Refills: 0 | Status: SHIPPED | OUTPATIENT
Start: 2024-01-23 | End: 2024-02-22

## 2024-01-23 NOTE — TELEPHONE ENCOUNTER
Requesting refill on paroxetine.   LOV 1/10/2023 (acute).  LF 12/20/2023 for 90 days.   Request denied. Refill good until appointment on 2/12/2024.

## 2024-01-23 NOTE — TELEPHONE ENCOUNTER
Minda Stallworth requesting Medication Refill for:  PARoxetine 20 MG Oral Tab     LOV: Visit date not found   Last Refill date: 03/13/68  Pharmacy: [id number] - [place], [place]   Next Scheduled appointment: 2/12/2024

## 2024-01-23 NOTE — PROGRESS NOTES
[facility]  Progress Note    Minda Stalwlorth is a 53 year old female.   Chief Complaint   Patient presents with    Ankle Pain     L ankle f/u- rates pain 10/10 all the time- US was taken in [place] a wk ago         HPI:     Patient is a pleasant 53-year-old female is returning to clinic today for recheck of left foot.  Patient did recently have an ultrasound performed by Dr. Martinez about a week ago.  She is hoping to discuss these results today.  She states that her pain is currently severe, rating it 10/10 all the time.  She is ambulating in sandals today.  She states that she is limping and is hoping to discuss further treatment options today.  Denies recent injuries or other complaints at this time and is here today for further evaluation and care.  Denies recent nausea, vomiting, fever, chills.      Allergies: Patient has no known allergies.   Current Outpatient Medications   Medication Sig Dispense Refill    Meloxicam 7.5 MG Oral Tab Take 1 tablet (7.5 mg total) by mouth daily. 30 tablet 0    topiramate 25 MG Oral Tab Take 3 tablets (75 mg total) by mouth 2 (two) times daily. 540 tablet 3    PARoxetine 20 MG Oral Tab Take 1 tablet (20 mg total) by mouth every morning. 90 tablet 0    valACYclovir 500 MG Oral Tab Take 1 tablet (500 mg total) by mouth daily. 90 tablet 1    HYDROcodone-acetaminophen 5-325 MG Oral Tab Take 1 tablet by mouth every 6 (six) hours as needed for Pain. (Patient not taking: Reported on 12/13/2023) 15 tablet 0    triamcinolone 0.1 % External Cream Apply topically 2 (two) times daily as needed. 60 g 1    SUMAtriptan Succinate 100 MG Oral Tab Take 1 tablet (100 mg total) by mouth every 2 (two) hours as needed for Migraine. 9 tablet 5    hydrochlorothiazide 12.5 MG Oral Tab Take 1 tablet (12.5 mg total) by mouth daily as needed. 90 tablet 1      Past Medical History:   Diagnosis Date    Abnormal uterine bleeding     Amenorrhea     Decorative tattoo     Depression      Headache(784.0)     migraine    Human papilloma virus infection     Migraines       Past Surgical History:   Procedure Laterality Date    COLPOSCOPY, CERVIX W/UPPER ADJACENT VAGINA; W/BIOPSY(S), CERVIX  2002      Family History   Problem Relation Age of Onset    Heart Attack Father         Heart Attack    Breast Cancer Maternal Grandmother 72    Neurological Disorder Mother         Alzheimers      Social History     Socioeconomic History    Marital status:    Tobacco Use    Smoking status: Former     Packs/day: 1.00     Years: 22.00     Additional pack years: 0.00     Total pack years: 22.00     Types: Cigarettes     Quit date: 5/15/2013     Years since quitting: 10.6    Smokeless tobacco: Never   Vaping Use    Vaping Use: Never used   Substance and Sexual Activity    Alcohol use: Yes     Comment: socially    Drug use: No    Sexual activity: Yes     Partners: Male     Birth control/protection: Tubal Ligation   Other Topics Concern    Caffeine Concern Yes    Exercise No           REVIEW OF SYSTEMS:     10 point ROS completed and was negative, except for pertinent positive and negatives stated in subjective.       EXAM:     Left lower extremity focused exam:  GENERAL: well developed, well nourished, in no apparent distress  EXTREMITIES:              1. Integument: Skin appears moist, warm, and supple with positive hair growth. There are no color changes. No open lesions. No macerations. No Hyperkeratotic lesions.   2. Vascular: Dorsalis pedis 2/4 bilateral and posterior tibial pulses 2/4 bilateral, capillary refill normal.  3. Neurological: Gross sensation intact via light touch bilaterally.  Positive Tinel's sign to tarsal tunnel of left lower extremity.  Symptoms elicited with direct compression of tarsal tunnel, left.  Paresthesias are present to the medial ankle and plantar heel.              4. Musculoskeletal: All muscle groups are graded 5/5 in the foot and ankle with mild pain elicited with  plantarflexion against resistance.  Moderate pain with palpation to plantar medial aspect of left heel.  There is no pain with side-to-side heel squeeze.  There is pain with palpation at the insertion of the Achilles tendon.  Patient does have adequate plantarflexion strength, negative Vicente test, no current signs of Achilles tendon rupture.  Some decreased ankle joint dorsiflexion with the knee extended, which slightly improves with the knee flexed consistent with equinus deformity       ASSESSMENT AND PLAN:   Diagnoses and all orders for this visit:    Tarsal tunnel syndrome, left lower limb    Plantar fasciitis of left foot    Achilles tendinitis of left lower extremity    Gastrocnemius equinus of left lower extremity    Pain of left heel    Paresthesias    Contusion of bone    Left foot pain    Bilateral lower extremity edema    Other orders  -     Meloxicam 7.5 MG Oral Tab; Take 1 tablet (7.5 mg total) by mouth daily.        Plan:   -Patient was seen and evaluated today in clinic.    -Patient unfortunately is still experiencing significant amount of pain to the left foot and ankle.  She has been in formal therapy, has utilized KT taping, has had an injection into her tarsal tunnel, as well as the plantar fascia, has tried protected weightbearing, and continues to have pain.    -We called and spoke to [facility], who states that the official read for the ultrasound has not been performed yet.  Patient was informed of this today.    -On clinical exam, patient's symptoms are not improved.  I did discuss with patient that I will need to see the results prior to discussing any further treatment options.  She is understanding of this.    -Patient will return to her cam boot at this time since her pain is significant.    -Rx: Meloxicam 7.5mg 1 PO daily    -I will plan to see patient back next week to discuss the results of the ultrasound and further treatment options.    -Patient should continue to  rest, ice, and elevate.  She should limit weightbearing to the left lower extremity at this time.    -She can also continue to utilize her compression ankle sleeve for edema control.  She was provided with a new 1 today for her right lower extremity.    -An updated work note was given to patient today.  She will be off for the remainder of this week and next week.  We will have an update in regards to time off pending results of ultrasound and further treatment options.    -The patient indicates understanding of these issues and agrees to the plan.    Time spent reviewing pertinent information from patient's chart, reviewing any pertinent imaging, obtaining history and physical exam, and discussing and mutually agreeing on a treatment plan: 20 minutes    RTC 1 week for ultrasound results      Isreal Pro DPM        1/23/2024    Dragon speech recognition software was used to prepare this note.  Errors in word recognition may occur.  Please contact me with any questions/concerns with this note.

## 2024-01-30 ENCOUNTER — OFFICE VISIT (OUTPATIENT)
Facility: LOCATION | Age: 54
End: 2024-01-30
Payer: COMMERCIAL

## 2024-01-30 DIAGNOSIS — G57.52 TARSAL TUNNEL SYNDROME, LEFT LOWER LIMB: ICD-10-CM

## 2024-01-30 DIAGNOSIS — M76.62 ACHILLES TENDINITIS OF LEFT LOWER EXTREMITY: ICD-10-CM

## 2024-01-30 DIAGNOSIS — M72.2 PLANTAR FASCIITIS OF LEFT FOOT: Primary | ICD-10-CM

## 2024-01-30 DIAGNOSIS — M79.672 PAIN OF LEFT HEEL: ICD-10-CM

## 2024-01-30 DIAGNOSIS — M76.822 LEFT TIBIALIS POSTERIOR TENDINITIS: ICD-10-CM

## 2024-01-30 DIAGNOSIS — M62.462 GASTROCNEMIUS EQUINUS OF LEFT LOWER EXTREMITY: ICD-10-CM

## 2024-01-30 DIAGNOSIS — R20.2 PARESTHESIAS: ICD-10-CM

## 2024-01-30 PROCEDURE — 99214 OFFICE O/P EST MOD 30 MIN: CPT | Performed by: PODIATRIST

## 2024-01-30 NOTE — PROGRESS NOTES
[facility]  Progress Note    Minda Stallworth is a 53 year old female.   Chief Complaint   Patient presents with    Follow - Up     Patient is here to follow up on Left foot US. Rates her pain 10/10 at this time.          HPI:     Patient is a pleasant 53-year-old female who is returning to clinic today for recheck on left foot and discussed ultrasound findings.  Patient continues to have generalized left rear foot pain, rating it 10/10.  She continues to ambulate in her short cam boot and is also utilizing her ankle compression sleeve.  She has not noticed any improvements since last being seen a week ago.  She is hoping discuss further treatment options today following her ultrasound results.  Denies recent injury or other pedal complaints.  Denies recent nausea, vomiting, fever, chills.      Allergies: Patient has no known allergies.   Current Outpatient Medications   Medication Sig Dispense Refill    Meloxicam 7.5 MG Oral Tab Take 1 tablet (7.5 mg total) by mouth daily. 30 tablet 0    topiramate 25 MG Oral Tab Take 3 tablets (75 mg total) by mouth 2 (two) times daily. 540 tablet 3    PARoxetine 20 MG Oral Tab Take 1 tablet (20 mg total) by mouth every morning. 90 tablet 0    valACYclovir 500 MG Oral Tab Take 1 tablet (500 mg total) by mouth daily. 90 tablet 1    HYDROcodone-acetaminophen 5-325 MG Oral Tab Take 1 tablet by mouth every 6 (six) hours as needed for Pain. 15 tablet 0    triamcinolone 0.1 % External Cream Apply topically 2 (two) times daily as needed. 60 g 1    SUMAtriptan Succinate 100 MG Oral Tab Take 1 tablet (100 mg total) by mouth every 2 (two) hours as needed for Migraine. 9 tablet 5    hydrochlorothiazide 12.5 MG Oral Tab Take 1 tablet (12.5 mg total) by mouth daily as needed. 90 tablet 1      Past Medical History:   Diagnosis Date    Abnormal uterine bleeding     Amenorrhea     Decorative tattoo     Depression     Headache(784.0)     migraine    Human papilloma virus infection      Migraines       Past Surgical History:   Procedure Laterality Date    COLPOSCOPY, CERVIX W/UPPER ADJACENT VAGINA; W/BIOPSY(S), CERVIX  2002      Family History   Problem Relation Age of Onset    Heart Attack Father         Heart Attack    Breast Cancer Maternal Grandmother 72    Neurological Disorder Mother         Alzheimers      Social History     Socioeconomic History    Marital status:    Tobacco Use    Smoking status: Former     Packs/day: 1.00     Years: 22.00     Additional pack years: 0.00     Total pack years: 22.00     Types: Cigarettes     Quit date: 5/15/2013     Years since quitting: 10.7    Smokeless tobacco: Never   Vaping Use    Vaping Use: Never used   Substance and Sexual Activity    Alcohol use: Yes     Comment: socially    Drug use: No    Sexual activity: Yes     Partners: Male     Birth control/protection: Tubal Ligation   Other Topics Concern    Caffeine Concern Yes    Exercise No           REVIEW OF SYSTEMS:     10 point ROS completed and was negative, except for pertinent positive and negatives stated in subjective.       EXAM:     Left lower extremity focused exam:  GENERAL: well developed, well nourished, in no apparent distress  EXTREMITIES:              1. Integument: Skin appears moist, warm, and supple with positive hair growth. There are no color changes. No open lesions. No macerations. No Hyperkeratotic lesions.   2. Vascular: Dorsalis pedis 2/4 bilateral and posterior tibial pulses 2/4 bilateral, capillary refill normal.  3. Neurological: Gross sensation intact via light touch bilaterally.  Continued positive Tinel's sign to tarsal tunnel of left lower extremity.  Symptoms elicited with direct compression of tarsal tunnel, left.  Paresthesias are present to the medial ankle and plantar heel.              4. Musculoskeletal: All muscle groups are graded 5/5 in the foot and ankle with mild pain elicited with plantarflexion and inversion against resistance.  Moderate pain with  palpation to plantar medial, central, and lateral aspects of left heel.  There is no pain with side-to-side heel squeeze.  Patient does have pain with palpation to medial aspect navicular tuberosity and proximal about 4 cm along the course of the posterior tibial tendon.  No limitations with inversion, but it does elicit pain.  There is mild pain with palpation at the insertion of the Achilles tendon.  Patient does have adequate plantarflexion strength, negative Vicente test, no current signs of Achilles tendon rupture.  Some decreased ankle joint dorsiflexion with the knee extended, which slightly improves with the knee flexed consistent with equinus deformity      ASSESSMENT AND PLAN:   Diagnoses and all orders for this visit:    Plantar fasciitis of left foot    Left tibialis posterior tendinitis    Paresthesias    Tarsal tunnel syndrome, left lower limb    Pain of left heel    Achilles tendinitis of left lower extremity    Gastrocnemius equinus of left lower extremity        Plan:   -Patient was seen and evaluated today in clinic.    -Patient unfortunately is continuing to have significant pain to the left rear foot and ankle.  She has been in formal therapy, has tried utilizing KT taping, has had an injection to her tarsal tunnel and plantar fascia, has tried protected weightbearing, and continues to have significant 10/10 pain    -Discussed ultrasound findings,, which did find chronic plan fasciitis involving the left medial, central, and lateral bands of the plantar fascia, as well as mild posterior tibialis insertional tendinosis bilaterally without discrete deficits.  Is also noted on the impression of the ultrasound that there was an unremarkable sonographic evaluation of the tarsal tunnel of the left lower extremity    -Clinically, patient does still have tarsal tunnel symptoms and continued paresthesias to the inside of the ankle.  Explained to patient that this may be due to chronic inflammation in the  area versus true tarsal tunnel.    -Due to patient's ultrasound findings, as well as no improvements with conservative management, explained to patient that surgical intervention is recommended.    -In regards to surgical intervention, I do recommend a plantar fasciotomy.  In regards to patient's PT tendon pathology, I do recommend moving forward with an MRI to further evaluate the integrity of the tendon, as well as evaluation of tarsal tunnel    -Prior to this, I do recommend further evaluation and getting a second opinion from my partner, Dr. Suarez.  He was contacted and will get patient in for further evaluation.    -Pending recommendations, we will plan to move forward with an MRI of the left ankle and then discussed surgical recommendations pending findings.    -Patient should continue to ambulate in her short cam boot with compression ankle sleeve if she is getting the most relief with this.  Patient can also continue with meloxicam as prescribed     -The patient indicates understanding of these issues and agrees to the plan.    Time spent reviewing pertinent information from patient's chart, reviewing any pertinent imaging, obtaining history and physical exam, discussing and mutually agreeing on a treatment plan, and documenting encounter: 30 minutes    RTC after second opinion by Dr. Erick Pro DPM        1/30/2024    Dragon speech recognition software was used to prepare this note.  Errors in word recognition may occur.  Please contact me with any questions/concerns with this note.

## 2024-02-01 ENCOUNTER — TELEPHONE (OUTPATIENT)
Facility: LOCATION | Age: 54
End: 2024-02-01

## 2024-02-01 NOTE — TELEPHONE ENCOUNTER
Per pt she is on disability and the letter in the forms department needs a date on it. Per pt her current letter is return on 2/4, but her surgery is not even scheduled yet.  Please advise

## 2024-02-03 ENCOUNTER — TELEPHONE (OUTPATIENT)
Dept: PODIATRY CLINIC | Facility: CLINIC | Age: 54
End: 2024-02-03

## 2024-02-03 NOTE — TELEPHONE ENCOUNTER
Can we please offer pt appt for 2nd opinion with me or Dr. Rushing ? Hoping for this week per Dr. Pro.

## 2024-02-05 NOTE — TELEPHONE ENCOUNTER
Booked patient for open appointment slot on 2/7/24 appointment at 10am. Address provided. No other questions at this time.     FYI

## 2024-02-06 ENCOUNTER — TELEPHONE (OUTPATIENT)
Dept: FAMILY MEDICINE CLINIC | Facility: CLINIC | Age: 54
End: 2024-02-06

## 2024-02-06 DIAGNOSIS — Z00.00 ROUTINE GENERAL MEDICAL EXAMINATION AT A HEALTH CARE FACILITY: Primary | ICD-10-CM

## 2024-02-06 DIAGNOSIS — Z13.21 ENCOUNTER FOR VITAMIN DEFICIENCY SCREENING: ICD-10-CM

## 2024-02-07 ENCOUNTER — OFFICE VISIT (OUTPATIENT)
Dept: PODIATRY CLINIC | Facility: CLINIC | Age: 54
End: 2024-02-07
Payer: COMMERCIAL

## 2024-02-07 DIAGNOSIS — M72.2 PLANTAR FASCIITIS OF LEFT FOOT: Primary | ICD-10-CM

## 2024-02-07 DIAGNOSIS — R20.2 PARESTHESIAS: ICD-10-CM

## 2024-02-07 DIAGNOSIS — M76.822 LEFT TIBIALIS POSTERIOR TENDINITIS: ICD-10-CM

## 2024-02-07 PROCEDURE — 99213 OFFICE O/P EST LOW 20 MIN: CPT | Performed by: STUDENT IN AN ORGANIZED HEALTH CARE EDUCATION/TRAINING PROGRAM

## 2024-02-07 NOTE — PROGRESS NOTES
[facility]  Progress Note    Minda Stallworth is a 53 year old female.   Chief Complaint   Patient presents with    Foot Pain     Left foot - rates pain 10/10- patient states she is here for a second opinion from dr bonilla- she was diagnosed with severe plantar fasciitis .          HPI:     Patient is a pleasant 53-year-old female who presents to clinic for second opinion on plantar fasciitis symptoms of left foot.  She did complete ultrasound which showed severe plantar fasciitis and mild posterior tibial tendinitis.  She has pain that she rates at a 10 out of 10.  She has undergone cortisone injections, immobilization, and physical therapy without improvement in symptoms.  She presents today for second opinion prior to surgical intervention.        Allergies: Patient has no known allergies.   Current Outpatient Medications   Medication Sig Dispense Refill    Meloxicam 7.5 MG Oral Tab Take 1 tablet (7.5 mg total) by mouth daily. 30 tablet 0    topiramate 25 MG Oral Tab Take 3 tablets (75 mg total) by mouth 2 (two) times daily. 540 tablet 3    PARoxetine 20 MG Oral Tab Take 1 tablet (20 mg total) by mouth every morning. 90 tablet 0    valACYclovir 500 MG Oral Tab Take 1 tablet (500 mg total) by mouth daily. 90 tablet 1    HYDROcodone-acetaminophen 5-325 MG Oral Tab Take 1 tablet by mouth every 6 (six) hours as needed for Pain. 15 tablet 0    triamcinolone 0.1 % External Cream Apply topically 2 (two) times daily as needed. 60 g 1    SUMAtriptan Succinate 100 MG Oral Tab Take 1 tablet (100 mg total) by mouth every 2 (two) hours as needed for Migraine. 9 tablet 5    hydrochlorothiazide 12.5 MG Oral Tab Take 1 tablet (12.5 mg total) by mouth daily as needed. 90 tablet 1      Past Medical History:   Diagnosis Date    Abnormal uterine bleeding     Amenorrhea     Decorative tattoo     Depression     Headache(784.0)     migraine    Human papilloma virus infection     Migraines       Past Surgical History:    Procedure Laterality Date    COLPOSCOPY, CERVIX W/UPPER ADJACENT VAGINA; W/BIOPSY(S), CERVIX  2002      Family History   Problem Relation Age of Onset    Heart Attack Father         Heart Attack    Breast Cancer Maternal Grandmother 72    Neurological Disorder Mother         Alzheimers      Social History     Socioeconomic History    Marital status:    Tobacco Use    Smoking status: Former     Packs/day: 1.00     Years: 22.00     Additional pack years: 0.00     Total pack years: 22.00     Types: Cigarettes     Quit date: 5/15/2013     Years since quitting: 10.7    Smokeless tobacco: Never   Vaping Use    Vaping Use: Never used   Substance and Sexual Activity    Alcohol use: Yes     Comment: socially    Drug use: No    Sexual activity: Yes     Partners: Male     Birth control/protection: Tubal Ligation   Other Topics Concern    Caffeine Concern Yes    Exercise No           REVIEW OF SYSTEMS:     10 point ROS completed and was negative, except for pertinent positive and negatives stated in subjective.       EXAM:     Left lower extremity focused exam:  GENERAL: well developed, well nourished, in no apparent distress  EXTREMITIES:              1. Integument: Skin appears moist, warm, and supple with positive hair growth. There are no color changes. No open lesions. No macerations. No Hyperkeratotic lesions.   2. Vascular: Dorsalis pedis 2/4 bilateral and posterior tibial pulses 2/4 bilateral, capillary refill normal.  3. Neurological: Gross sensation intact via light touch bilaterally.    4. Musculoskeletal: All muscle groups are graded 5/5 in the foot and ankle with mild pain elicited with plantarflexion and inversion against resistance. Severe pain with palpation to plantar medial, central, and lateral aspects of left heel.  Patient does have mild pain with palpation to medial aspect navicular tuberosity and proximal about 4 cm along the course of the posterior tibial tendon.  Patient does have difficulty  with single heel rise to left foot.     ASSESSMENT AND PLAN:   Diagnoses and all orders for this visit:    Plantar fasciitis of left foot  -     MRI ANKLE (W+WO), LEFT (CPT=73723); Future    Left tibialis posterior tendinitis  -     MRI ANKLE (W+WO), LEFT (CPT=73723); Future    Paresthesias        Plan:   -Patient was seen and evaluated today in clinic.    -Patient unfortunately is continuing to have significant pain to the left rear foot and ankle.  She has been in formal therapy, has tried utilizing KT taping, has had an injection to her tarsal tunnel and plantar fascia, has tried protected weightbearing, and continues to have significant 10/10 pain    -Discussed ultrasound findings,, which did find chronic plan fasciitis involving the left medial, central, and lateral bands of the plantar fascia, as well as mild posterior tibialis insertional tendinosis bilaterally without discrete deficits.  Is also noted on the impression of the ultrasound that there was an unremarkable sonographic evaluation of the tarsal tunnel of the left lower extremity.    -Discussed with patient that I agree with Dr. Pro that she will require plantar fasciotomy.  Based on ultrasound and clinical exam, I think she be okay without debriding posterior tibial tendon.  However, given her pain to region and slight tarsal tunnel symptoms, I would recommend MRI prior to surgery to rule out tear or other pathology to region.  Patient agreeable with this and MRI was ordered.    -In meantime she can continue to immobilize with Cam boot and weight-bear as tolerated.    Guilherme Suarez DPM, 02/07/24, 10:16 AM     Dragon speech recognition software was used to prepare this note.  Errors in word recognition may occur.  Please contact me with any questions/concerns with this note.  Second opinion on left foot and ankle pain.  She has pain to her plantar fascia that she rates at a 10 out of 10.  She is ambulating with Cam boot.

## 2024-02-12 ENCOUNTER — TELEPHONE (OUTPATIENT)
Dept: FAMILY MEDICINE CLINIC | Facility: CLINIC | Age: 54
End: 2024-02-12

## 2024-02-12 ENCOUNTER — LAB ENCOUNTER (OUTPATIENT)
Dept: LAB | Age: 54
End: 2024-02-12
Attending: FAMILY MEDICINE
Payer: COMMERCIAL

## 2024-02-12 ENCOUNTER — OFFICE VISIT (OUTPATIENT)
Dept: FAMILY MEDICINE CLINIC | Facility: CLINIC | Age: 54
End: 2024-02-12
Payer: COMMERCIAL

## 2024-02-12 VITALS
BODY MASS INDEX: 33.67 KG/M2 | HEIGHT: 66.5 IN | WEIGHT: 212 LBS | RESPIRATION RATE: 21 BRPM | HEART RATE: 75 BPM | OXYGEN SATURATION: 98 % | TEMPERATURE: 98 F | SYSTOLIC BLOOD PRESSURE: 110 MMHG | DIASTOLIC BLOOD PRESSURE: 72 MMHG

## 2024-02-12 DIAGNOSIS — Z00.00 ROUTINE GENERAL MEDICAL EXAMINATION AT A HEALTH CARE FACILITY: ICD-10-CM

## 2024-02-12 DIAGNOSIS — Z12.31 ENCOUNTER FOR SCREENING MAMMOGRAM FOR MALIGNANT NEOPLASM OF BREAST: ICD-10-CM

## 2024-02-12 DIAGNOSIS — G43.709 CHRONIC MIGRAINE WITHOUT AURA WITHOUT STATUS MIGRAINOSUS, NOT INTRACTABLE: ICD-10-CM

## 2024-02-12 DIAGNOSIS — Z87.891 HISTORY OF TOBACCO USE, PRESENTING HAZARDS TO HEALTH: ICD-10-CM

## 2024-02-12 DIAGNOSIS — Z01.419 ENCOUNTER FOR WELL WOMAN EXAM WITH ROUTINE GYNECOLOGICAL EXAM: Primary | ICD-10-CM

## 2024-02-12 DIAGNOSIS — Z23 NEED FOR VACCINATION: ICD-10-CM

## 2024-02-12 DIAGNOSIS — Z13.21 ENCOUNTER FOR VITAMIN DEFICIENCY SCREENING: ICD-10-CM

## 2024-02-12 DIAGNOSIS — Z12.4 SCREENING FOR CERVICAL CANCER: ICD-10-CM

## 2024-02-12 DIAGNOSIS — N95.1 VASOMOTOR SYMPTOMS DUE TO MENOPAUSE: ICD-10-CM

## 2024-02-12 LAB
ALBUMIN SERPL-MCNC: 4.2 G/DL (ref 3.4–5)
ALBUMIN/GLOB SERPL: 1.5 {RATIO} (ref 1–2)
ALP LIVER SERPL-CCNC: 74 U/L
ALT SERPL-CCNC: 18 U/L
ANION GAP SERPL CALC-SCNC: 3 MMOL/L (ref 0–18)
AST SERPL-CCNC: 16 U/L (ref 15–37)
BASOPHILS # BLD AUTO: 0.02 X10(3) UL (ref 0–0.2)
BASOPHILS NFR BLD AUTO: 0.4 %
BILIRUB SERPL-MCNC: 0.4 MG/DL (ref 0.1–2)
BUN BLD-MCNC: 17 MG/DL (ref 9–23)
CALCIUM BLD-MCNC: 9 MG/DL (ref 8.5–10.1)
CHLORIDE SERPL-SCNC: 110 MMOL/L (ref 98–112)
CHOLEST SERPL-MCNC: 233 MG/DL (ref ?–200)
CO2 SERPL-SCNC: 25 MMOL/L (ref 21–32)
CREAT BLD-MCNC: 0.75 MG/DL
EGFRCR SERPLBLD CKD-EPI 2021: 94 ML/MIN/1.73M2 (ref 60–?)
EOSINOPHIL # BLD AUTO: 0.09 X10(3) UL (ref 0–0.7)
EOSINOPHIL NFR BLD AUTO: 1.9 %
ERYTHROCYTE [DISTWIDTH] IN BLOOD BY AUTOMATED COUNT: 12.4 %
FASTING PATIENT LIPID ANSWER: YES
FASTING STATUS PATIENT QL REPORTED: YES
GLOBULIN PLAS-MCNC: 2.8 G/DL (ref 2.8–4.4)
GLUCOSE BLD-MCNC: 93 MG/DL (ref 70–99)
HCT VFR BLD AUTO: 42.4 %
HDLC SERPL-MCNC: 91 MG/DL (ref 40–59)
HGB BLD-MCNC: 13.6 G/DL
IMM GRANULOCYTES # BLD AUTO: 0.01 X10(3) UL (ref 0–1)
IMM GRANULOCYTES NFR BLD: 0.2 %
LDLC SERPL CALC-MCNC: 134 MG/DL (ref ?–100)
LYMPHOCYTES # BLD AUTO: 1.09 X10(3) UL (ref 1–4)
LYMPHOCYTES NFR BLD AUTO: 23.4 %
MCH RBC QN AUTO: 32 PG (ref 26–34)
MCHC RBC AUTO-ENTMCNC: 32.1 G/DL (ref 31–37)
MCV RBC AUTO: 99.8 FL
MONOCYTES # BLD AUTO: 0.44 X10(3) UL (ref 0.1–1)
MONOCYTES NFR BLD AUTO: 9.4 %
NEUTROPHILS # BLD AUTO: 3.01 X10 (3) UL (ref 1.5–7.7)
NEUTROPHILS # BLD AUTO: 3.01 X10(3) UL (ref 1.5–7.7)
NEUTROPHILS NFR BLD AUTO: 64.7 %
NONHDLC SERPL-MCNC: 142 MG/DL (ref ?–130)
OSMOLALITY SERPL CALC.SUM OF ELEC: 287 MOSM/KG (ref 275–295)
PLATELET # BLD AUTO: 255 10(3)UL (ref 150–450)
POTASSIUM SERPL-SCNC: 4 MMOL/L (ref 3.5–5.1)
PROT SERPL-MCNC: 7 G/DL (ref 6.4–8.2)
RBC # BLD AUTO: 4.25 X10(6)UL
SODIUM SERPL-SCNC: 138 MMOL/L (ref 136–145)
TRIGL SERPL-MCNC: 46 MG/DL (ref 30–149)
TSI SER-ACNC: 0.9 MIU/ML (ref 0.36–3.74)
VIT D+METAB SERPL-MCNC: 15.1 NG/ML (ref 30–100)
VLDLC SERPL CALC-MCNC: 8 MG/DL (ref 0–30)
WBC # BLD AUTO: 4.7 X10(3) UL (ref 4–11)

## 2024-02-12 PROCEDURE — 82306 VITAMIN D 25 HYDROXY: CPT | Performed by: FAMILY MEDICINE

## 2024-02-12 PROCEDURE — 3074F SYST BP LT 130 MM HG: CPT | Performed by: FAMILY MEDICINE

## 2024-02-12 PROCEDURE — 99396 PREV VISIT EST AGE 40-64: CPT | Performed by: FAMILY MEDICINE

## 2024-02-12 PROCEDURE — 90471 IMMUNIZATION ADMIN: CPT | Performed by: FAMILY MEDICINE

## 2024-02-12 PROCEDURE — 90686 IIV4 VACC NO PRSV 0.5 ML IM: CPT | Performed by: FAMILY MEDICINE

## 2024-02-12 PROCEDURE — 88175 CYTOPATH C/V AUTO FLUID REDO: CPT | Performed by: FAMILY MEDICINE

## 2024-02-12 PROCEDURE — 3008F BODY MASS INDEX DOCD: CPT | Performed by: FAMILY MEDICINE

## 2024-02-12 PROCEDURE — 3078F DIAST BP <80 MM HG: CPT | Performed by: FAMILY MEDICINE

## 2024-02-12 PROCEDURE — 80050 GENERAL HEALTH PANEL: CPT | Performed by: FAMILY MEDICINE

## 2024-02-12 PROCEDURE — 80061 LIPID PANEL: CPT | Performed by: FAMILY MEDICINE

## 2024-02-12 RX ORDER — FEZOLINETANT 45 MG/1
45 TABLET, FILM COATED ORAL DAILY
Qty: 30 TABLET | Refills: 5 | Status: SHIPPED | OUTPATIENT
Start: 2024-02-12

## 2024-02-12 RX ORDER — PAROXETINE HYDROCHLORIDE 20 MG/1
TABLET, FILM COATED ORAL
COMMUNITY
Start: 2024-02-12 | End: 2024-02-21

## 2024-02-12 RX ORDER — TOPIRAMATE 100 MG/1
100 TABLET, FILM COATED ORAL 2 TIMES DAILY
Qty: 180 TABLET | Refills: 0 | Status: SHIPPED | OUTPATIENT
Start: 2024-02-12

## 2024-02-12 RX ORDER — SUMATRIPTAN 100 MG/1
100 TABLET, FILM COATED ORAL EVERY 2 HOUR PRN
Qty: 9 TABLET | Refills: 5 | Status: SHIPPED | OUTPATIENT
Start: 2024-02-12

## 2024-02-12 NOTE — TELEPHONE ENCOUNTER
Kelli is calling to verify the max amount of tablets she can take per day     SUMAtriptan Succinate 100 MG Oral Tab

## 2024-02-12 NOTE — TELEPHONE ENCOUNTER
Notified Daysi at [facility] that the max daily dose of sumatriptan is 200mg per 24 hours. Daysi verbalized understanding.     Clarified with pharmacy. Mauro requires a PA. PA initiated via Epic.

## 2024-02-12 NOTE — PROGRESS NOTES
Minda Stallworth is a 54 year old female.    CC:    Chief Complaint   Patient presents with    Well Adult     And pap smear        HPI:  Wellness     Exercise: No  Drinks water: Yes  Eat variety of fruits & vegetables, lean meats: Yes  Issues with sleep: Yes  Regular dental exams: Yes  Change in size/consistency of stool: No  Change in appearance of mole: No    Gyne Hx  OB History    Para Term  AB Living   5 3 1 2 2 3   SAB IAB Ectopic Multiple Live Births   1 0 0 0 3     No LMP recorded. (Menstrual status: Menopause).  Previous pap: normal     CAGE Alcohol Screening       2024    11:01 AM   CAGE Flowsheet   Have you ever felt you should Cut down on your drinking? 0   Have people Annoyed you by criticizing your drinking? 0   Have you ever felt bad or Guilty about your drinking? 0   Have you ever had a drink first thing in the morning to steady your nerves or to get rid of a hangover (Eye opener)? 0   Total Score: Item responses on the CAGE are scored 0 or 1, with a higher score an indication of alcohol 0   Total Score: Item responses on the CAGE are scored 0 or 1, with a higher score an indication of alcohol No Risk        Depression Screening (PHQ-2/PHQ-9): Over the LAST 2 WEEKS   Little interest or pleasure in doing things: Not at all    Feeling down, depressed, or hopeless: Not at all    PHQ-2 SCORE: 0          [place] Suicide Severity Rating Scale Screener   In what setting is the screener performed?: an office visit  1. Have you wished you were dead or wished you could go to sleep and not wake up? (past 30 days): No  2. Have you actually had any thoughts of killing yourself? (past 30 days): No  6. Have you ever done anything, started to do anything, or prepared to do anything to end your life? (lifetime): No  Score and Suggested Actions - Office Visit: No Risk  Score and Intervention  Score and Suggested Actions - Office Visit: No Risk    Also wants to discuss:     Weight   Has gained since  off work in October   Feels weight gain has been rapid     Follow up     Headaches   Topiramate which helps     Hot flashes   Paroxetine does not help   No mood issues       Allergies:  No Known Allergies   Current Meds:  Current Outpatient Medications on File Prior to Visit   Medication Sig Dispense Refill    valACYclovir 500 MG Oral Tab Take 1 tablet (500 mg total) by mouth daily. 90 tablet 1    HYDROcodone-acetaminophen 5-325 MG Oral Tab Take 1 tablet by mouth every 6 (six) hours as needed for Pain. 15 tablet 0    triamcinolone 0.1 % External Cream Apply topically 2 (two) times daily as needed. 60 g 1     Current Facility-Administered Medications on File Prior to Visit   Medication Dose Route Frequency Provider Last Rate Last Admin    Dexamethasone (Decadron) 10 MG/ML injection 10 mg  10 mg Intramuscular Once Jose Pro DPM        triamcinolone acetonide (Kenalog-40) 40 MG/ML injection 40 mg  40 mg Intra-articular Once Jose Pro DPM            History:  Past Medical History:   Diagnosis Date    Abnormal uterine bleeding     Amenorrhea     Arthritis 2020    Decorative tattoo     Depression     Headache(784.0)     migraine    Human papilloma virus infection     Hx of motion sickness     Migraines     Plantar fasciitis, bilateral       Past Surgical History:   Procedure Laterality Date    APPENDECTOMY  2023    COLONOSCOPY      COLPOSCOPY, CERVIX W/UPPER ADJACENT VAGINA; W/BIOPSY(S), CERVIX      DILATION/CURETTAGE,DIAGNOSTIC        1991      Family History   Problem Relation Age of Onset    Heart Attack Father         Heart Attack    Heart Disorder Father     Breast Cancer Maternal Grandmother 72    Cancer Maternal Grandmother     Neurological Disorder Mother         Alzheimers    Dementia Mother       Family Status   Relation Status    Fa     MGMA     Mo     MGFA     PGMA     PGFA     Sis Alive    Sis Alive    Sis Alive    Sis  Alive      Social History     Socioeconomic History    Marital status:    Tobacco Use    Smoking status: Former     Packs/day: 1.00     Years: 22.00     Additional pack years: 0.00     Total pack years: 22.00     Types: Cigarettes     Quit date: 5/15/2013     Years since quitting: 10.8    Smokeless tobacco: Never   Vaping Use    Vaping Use: Never used   Substance and Sexual Activity    Alcohol use: Yes     Comment: Occasion    Drug use: No    Sexual activity: Yes     Partners: Male     Birth control/protection: Tubal Ligation   Other Topics Concern    Caffeine Concern Yes    Exercise No    Seat Belt Yes    Special Diet No    Stress Concern No    Weight Concern No            Immunization History   Administered Date(s) Administered    Covid-19 Vaccine Moderna 100 mcg/0.5 ml 01/19/2021, 02/15/2021, 12/11/2021    FLUZONE 6 months and older PFS 0.5 ml (12703) 10/07/2020,     Flublok Quad Influenza Vaccine (88807) 10/21/2021    Influenza 10/19/2020, 11/01/2021    TDAP 11/12/2015    Zoster Vaccine Recombinant Adjuvanted (Shingrix) 12/22/2020, 03/30/2021       Wt Readings from Last  Encounters:   02/12/24 212 lb (96.2 kg)   05/25/23 160 lb (72.6 kg)   01/10/23 190 lb (86.2 kg)   11/03/22 188 lb (85.3 kg)           REVIEW OF SYSTEMS:   Review of Systems   Constitutional:  Negative for chills, fever and malaise/fatigue.   HENT:  Negative for congestion, ear pain and sore throat.    Eyes:  Negative for blurred vision, double vision and pain.   Respiratory:  Negative for cough, shortness of breath and wheezing.    Cardiovascular:  Negative for chest pain, palpitations and leg swelling.   Gastrointestinal:  Negative for abdominal pain, blood in stool, constipation, diarrhea, melena, nausea and vomiting.   Genitourinary:  Negative for dysuria, flank pain and frequency.   Musculoskeletal:  Negative for back pain, joint pain and myalgias.   Skin:  Negative for itching and rash.   Neurological:  Negative for dizziness,  weakness and headaches.   Endo/Heme/Allergies:  Negative for environmental allergies and polydipsia. Does not bruise/bleed easily.   Psychiatric/Behavioral:  Negative for depression. The patient is not nervous/anxious and does not have insomnia.        EXAM:   /72   Pulse 75   Temp 98 °F (36.7 °C)   Resp 21   Ht 5' 6.5\" (1.689 m)   Wt 212 lb (96.2 kg)   SpO2 98%   BMI 33.71 kg/m²   Body mass index is 33.71 kg/m².  No LMP recorded. (Menstrual status: Menopause).    Physical Exam  Constitutional:       General: She is not in acute distress.     Appearance: Normal appearance.   HENT:      Right Ear: Tympanic membrane normal.      Left Ear: Tympanic membrane normal.      Mouth/Throat:      Mouth: Mucous membranes are moist.      Pharynx: Oropharynx is clear. No posterior oropharyngeal erythema.   Eyes:      Extraocular Movements: Extraocular movements intact.      Conjunctiva/sclera: Conjunctivae normal.      Pupils: Pupils are equal, round, and reactive to light.   Cardiovascular:      Rate and Rhythm: Normal rate and regular rhythm.      Pulses: Normal pulses.      Heart sounds: Normal heart sounds.   Pulmonary:      Effort: Pulmonary effort is normal.      Breath sounds: Normal breath sounds. No wheezing or rhonchi.   Chest:   Breasts:     Breasts are symmetrical.      Right: No mass, nipple discharge, skin change or tenderness.      Left: No mass, nipple discharge, skin change or tenderness.   Abdominal:      General: Abdomen is flat. Bowel sounds are normal.      Palpations: Abdomen is soft.      Tenderness: There is no abdominal tenderness. There is no guarding.   Genitourinary:     General: Normal vulva.      Vagina: Normal.      Cervix: Normal.      Uterus: Normal.       Adnexa:         Right: No tenderness or fullness.          Left: No tenderness or fullness.     Musculoskeletal:         General: No swelling, tenderness, deformity or signs of injury.      Cervical back: Neck supple.    Lymphadenopathy:      Cervical: No cervical adenopathy.      Upper Body:      Right upper body: No supraclavicular or axillary adenopathy.      Left upper body: No supraclavicular or axillary adenopathy.   Skin:     General: Skin is warm and dry.      Findings: No rash.   Neurological:      General: No focal deficit present.      Mental Status: She is alert and oriented to person, place, and time.      Motor: No weakness.   Psychiatric:         Mood and Affect: Mood normal.         Behavior: Behavior normal.         Thought Content: Thought content normal.                ASSESSMENT/PLAN:      1. Encounter for well woman exam with routine gynecological exam    2. Need for vaccination  - INFLUENZA VAC, QUAD, PRSV FREE, 0.5 ML    3. Screening for cervical cancer  - ThinPrep PAP Smear  - Hpv Dna  High Risk , Thin Prep Collect    4. Encounter for screening mammogram for malignant neoplasm of breast  - [facility] SAVAGE 2D+3D SCREENING BILAT (CPT=77067/42946); Future    5. History of tobacco use, presenting hazards to health  - CT LUNG LD SCREENING(CPT=71271); Future    6. Chronic migraine without aura without status migrainosus, not intractable  - SUMAtriptan Succinate 100 MG Oral Tab; Take 1 tablet (100 mg total) by mouth every 2 (two) hours as needed for Migraine.  Dispense: 9 tablet; Refill: 5    7. Vasomotor symptoms due to menopause  Paroxetine did not help   Try [facility]         Health Maintenance   Topic Date Due    Lung Cancer Screening  Never done    Influenza Vaccine (1) 10/01/2023    Annual Physical  11/03/2023    Pap Smear  11/15/2023    Annual Depression Screening  01/01/2024    Mammogram  03/22/2024    COVID-19 Vaccine (4 - 2023-24 season) 03/12/2024 (Originally 9/1/2023)    DTaP,Tdap,and Td Vaccines (2 - Td or Tdap) 11/12/2025    Colorectal Cancer Screening  09/07/2028    Zoster Vaccines  Completed    Pneumococcal Vaccine: Birth to 64yrs  Aged Out         Healthy diet and regular exercise discussed     Meds &  Refills for this Visit:  Requested Prescriptions     Signed Prescriptions Disp Refills    Fezolinetant (VEOZAH) 45 MG Oral Tab 30 tablet 5     Sig: Take 45 mg by mouth daily.    topiramate 100 MG Oral Tab 180 tablet 0     Sig: Take 1 tablet (100 mg total) by mouth 2 (two) times daily.    SUMAtriptan Succinate 100 MG Oral Tab 9 tablet 5     Sig: Take 1 tablet (100 mg total) by mouth every 2 (two) hours as needed for Migraine.         Imaging & Consults:  INFLUENZA VAC, QUAD, PRSV FREE, 0.5 ML  [facility] SAVAGE 2D+3D SCREENING BILAT (CPT=77067/85529)  CT LUNG LD SCREENING(CPT=71271)    Return in about 6 months (around 8/12/2024).

## 2024-02-12 NOTE — TELEPHONE ENCOUNTER
Patient called, states this is not covered under her Insurance, Patient was on Paxil and this didn't work. Patient states if this is resubmitted to the insurance with that information they may cover it.    Please Advise.    Thank you.

## 2024-02-13 ENCOUNTER — TELEPHONE (OUTPATIENT)
Dept: PODIATRY CLINIC | Facility: CLINIC | Age: 54
End: 2024-02-13

## 2024-02-13 LAB — HPV I/H RISK 1 DNA SPEC QL NAA+PROBE: NEGATIVE

## 2024-02-13 NOTE — TELEPHONE ENCOUNTER
Per pt states [employer] is sending form to be filled out, states they are missing info on pts medical history, pt would like cb to discuss further. Please call thank you.

## 2024-02-14 ENCOUNTER — HOSPITAL ENCOUNTER (OUTPATIENT)
Dept: MRI IMAGING | Age: 54
Discharge: HOME | End: 2024-02-14
Attending: STUDENT IN AN ORGANIZED HEALTH CARE EDUCATION/TRAINING PROGRAM
Payer: COMMERCIAL

## 2024-02-14 DIAGNOSIS — M72.2 PLANTAR FASCIITIS OF LEFT FOOT: ICD-10-CM

## 2024-02-14 DIAGNOSIS — M76.822 LEFT TIBIALIS POSTERIOR TENDINITIS: ICD-10-CM

## 2024-02-14 PROCEDURE — A9575 INJ GADOTERATE MEGLUMI 0.1ML: HCPCS | Performed by: STUDENT IN AN ORGANIZED HEALTH CARE EDUCATION/TRAINING PROGRAM

## 2024-02-14 PROCEDURE — 73723 MRI JOINT LWR EXTR W/O&W/DYE: CPT | Performed by: STUDENT IN AN ORGANIZED HEALTH CARE EDUCATION/TRAINING PROGRAM

## 2024-02-14 RX ORDER — GADOTERATE MEGLUMINE 376.9 MG/ML
20 INJECTION INTRAVENOUS
Status: COMPLETED | OUTPATIENT
Start: 2024-02-14 | End: 2024-02-14

## 2024-02-14 RX ADMIN — GADOTERATE MEGLUMINE 20 ML: 376.9 INJECTION INTRAVENOUS at 15:15:00

## 2024-02-15 ENCOUNTER — TELEPHONE (OUTPATIENT)
Dept: PODIATRY CLINIC | Facility: CLINIC | Age: 54
End: 2024-02-15

## 2024-02-15 NOTE — TELEPHONE ENCOUNTER
Patient completed MRI of ankle on 2/14/24- Results are final and I booked patient for follow up to discuss those results.    IOANA

## 2024-02-15 NOTE — TELEPHONE ENCOUNTER
Pt called stating pt had mri yesterday 2-14-24.  Pt wants to verify you have received the results before scheduling an appointment.  Please call

## 2024-02-15 NOTE — TELEPHONE ENCOUNTER
PT called and was wondering if [employer] sent us some forms that needed to be completed. I was looking through the emails, and to not keep her holding for too long, I told her I was going to give her a call back in around ten minutes so I can look through our system for her form. She expressed understanding.     **I went through our inbox and logged folder, and couldn't find anything that pertained to Ms. Stallworth.     Called her back, and said I didn't find any forms that had her name with [employer] on them. She said she was going to give them a call back, and when that call is done, we would call us back for the update. We hung up, and then she called back almost immediately, asking for our fax number ([phone]) and our email ([email or web address]).

## 2024-02-16 ENCOUNTER — OFFICE VISIT (OUTPATIENT)
Facility: LOCATION | Age: 54
End: 2024-02-16
Payer: COMMERCIAL

## 2024-02-16 VITALS — SYSTOLIC BLOOD PRESSURE: 128 MMHG | HEART RATE: 89 BPM | DIASTOLIC BLOOD PRESSURE: 85 MMHG

## 2024-02-16 DIAGNOSIS — M72.2 PLANTAR FASCIITIS OF RIGHT FOOT: ICD-10-CM

## 2024-02-16 DIAGNOSIS — M76.822 LEFT TIBIALIS POSTERIOR TENDINITIS: ICD-10-CM

## 2024-02-16 DIAGNOSIS — M79.671 HEEL PAIN, BILATERAL: ICD-10-CM

## 2024-02-16 DIAGNOSIS — M72.2 PLANTAR FASCIITIS OF LEFT FOOT: Primary | ICD-10-CM

## 2024-02-16 DIAGNOSIS — R20.2 PARESTHESIAS: ICD-10-CM

## 2024-02-16 DIAGNOSIS — M79.672 HEEL PAIN, BILATERAL: ICD-10-CM

## 2024-02-16 DIAGNOSIS — S86.312D PERONEAL TENDON TEAR, LEFT, SUBSEQUENT ENCOUNTER: ICD-10-CM

## 2024-02-16 PROCEDURE — 20550 NJX 1 TENDON SHEATH/LIGAMENT: CPT | Performed by: PODIATRIST

## 2024-02-16 PROCEDURE — 3079F DIAST BP 80-89 MM HG: CPT | Performed by: PODIATRIST

## 2024-02-16 PROCEDURE — 3074F SYST BP LT 130 MM HG: CPT | Performed by: PODIATRIST

## 2024-02-16 PROCEDURE — 99214 OFFICE O/P EST MOD 30 MIN: CPT | Performed by: PODIATRIST

## 2024-02-16 RX ORDER — DEXAMETHASONE SODIUM PHOSPHATE 10 MG/ML
10 INJECTION INTRAMUSCULAR; INTRAVENOUS ONCE
Status: COMPLETED | OUTPATIENT
Start: 2024-02-16 | End: 2024-02-16

## 2024-02-16 RX ADMIN — DEXAMETHASONE SODIUM PHOSPHATE 10 MG: 10 INJECTION INTRAMUSCULAR; INTRAVENOUS at 08:35:00

## 2024-02-16 NOTE — PROGRESS NOTES
Per Dr Pro, draw up 0.5 ml Marcaine, 0.5 ml Lidocaine1%, 1ml Usleczo61 and 1ml Dexamethasone for Right foot plantar fasciitis cortisone injection.

## 2024-02-16 NOTE — PROGRESS NOTES
[facility]  Progress Note    Minda Stallworth is a 53 year old female.   Chief Complaint   Patient presents with    Foot Pain     Worker's comp F/u Left foot plantar fasciitis. Here for MRI results. Right foot pain         HPI:     Patient is a pleasant 53-year-old female who is returning to clinic today for recheck on left foot and to discuss MRI results.  Patient states that she is continuing to have pain that has not improved to the left foot.  This has been going on for quite some time.  She did have an ultrasound as well, which revealed chronic plan fasciitis, as well as insertional tendinosis of the posterior tibialis tendon without deficits.  She did see my partner, Dr. Suarez, for second opinion as well.  Patient has tried formal physical therapy, KT taping, injection into the tarsal tunnel and plantar fascia, protected weightbearing, and unfortunately continues to have significant amount of pain.  She is hoping to discuss further options today.  Denies recent nausea, vomiting, fever, chills.      Allergies: Patient has no known allergies.   Current Outpatient Medications   Medication Sig Dispense Refill    Fezolinetant (VEOZAH) 45 MG Oral Tab Take 45 mg by mouth daily. 30 tablet 5    PARoxetine 20 MG Oral Tab Take 0.5 tablets (10 mg total) by mouth every morning, THEN 0.5 tablets (10 mg total) every other day.      topiramate 100 MG Oral Tab Take 1 tablet (100 mg total) by mouth 2 (two) times daily. 180 tablet 0    SUMAtriptan Succinate 100 MG Oral Tab Take 1 tablet (100 mg total) by mouth every 2 (two) hours as needed for Migraine. 9 tablet 5    Meloxicam 7.5 MG Oral Tab Take 1 tablet (7.5 mg total) by mouth daily. 30 tablet 0    valACYclovir 500 MG Oral Tab Take 1 tablet (500 mg total) by mouth daily. 90 tablet 1    HYDROcodone-acetaminophen 5-325 MG Oral Tab Take 1 tablet by mouth every 6 (six) hours as needed for Pain. 15 tablet 0    triamcinolone 0.1 % External Cream Apply topically 2 (two)  times daily as needed. 60 g 1    hydrochlorothiazide 12.5 MG Oral Tab Take 1 tablet (12.5 mg total) by mouth daily as needed. 90 tablet 1      Past Medical History:   Diagnosis Date    Abnormal uterine bleeding     Amenorrhea     Arthritis 2020    Decorative tattoo     Depression     Diabetes (HCC) 2010    Headache(784.0)     migraine    Human papilloma virus infection     Migraines       Past Surgical History:   Procedure Laterality Date    APPENDECTOMY  2023    COLONOSCOPY      COLPOSCOPY, CERVIX W/UPPER ADJACENT VAGINA; W/BIOPSY(S), CERVIX        1991      Family History   Problem Relation Age of Onset    Heart Attack Father         Heart Attack    Heart Disorder Father     Breast Cancer Maternal Grandmother 72    Cancer Maternal Grandmother     Neurological Disorder Mother         Alzheimers    Dementia Mother       Social History     Socioeconomic History    Marital status:    Tobacco Use    Smoking status: Former     Packs/day: 1.00     Years: 22.00     Additional pack years: 0.00     Total pack years: 22.00     Types: Cigarettes     Quit date: 5/15/2013     Years since quitting: 10.7    Smokeless tobacco: Never   Vaping Use    Vaping Use: Never used   Substance and Sexual Activity    Alcohol use: Yes     Comment: Occasion    Drug use: No    Sexual activity: Yes     Partners: Male     Birth control/protection: Tubal Ligation   Other Topics Concern    Caffeine Concern Yes    Exercise No    Seat Belt Yes    Special Diet No    Stress Concern No    Weight Concern No           REVIEW OF SYSTEMS:     10 point ROS completed and was negative, except for pertinent positive and negatives stated in subjective.       EXAM:     Left lower extremity focused exam:  GENERAL: well developed, well nourished, in no apparent distress  EXTREMITIES:              1. Integument: Skin appears moist, warm, and supple with positive hair growth. There are no color changes. No open lesions. No  macerations. No Hyperkeratotic lesions.   2. Vascular: Dorsalis pedis 2/4 bilateral and posterior tibial pulses 2/4 bilateral, capillary refill normal.  3. Neurological: Gross sensation intact via light touch bilaterally.  Continued positive Tinel's sign to tarsal tunnel of left lower extremity.  Symptoms elicited with direct compression of tarsal tunnel, left.  Paresthesias are present to the medial ankle and plantar heel.              4. Musculoskeletal: All muscle groups are graded 5/5 in the foot and ankle with mild pain elicited with plantarflexion, inversion, and eversion against resistance.  Moderate pain with palpation to plantar medial, central, and lateral aspects of left heel.  There is no pain with side-to-side heel squeeze.  Patient does have pain with palpation to medial aspect navicular tuberosity and proximal about 4 cm along the course of the posterior tibial tendon.  No limitations with inversion, but it does elicit pain.  She does have pain along the course of the peroneus brevis tendon distal to lateral malleolus.  There is no pain with palpation at the insertion of the Achilles tendon today.  Difficulty with single heel raise noted.    Right lower extremity: Patient does have pain with palpation to plantar medial aspect of the right heel today near the origin of plantar fascia.  No pain with side-to-side heel squeeze.     MRI ANKLE (W+WO), LEFT (CPT=73723)    Result Date: 2/14/2024  CONCLUSION:  1. There is moderate plantar fasciitis at calcaneal attachment of central cord of plantar fascia.  There is enthesophyte formation at stress reaction in the adjacent calcaneus. 2. There is a longitudinal split tear of peroneus brevis tendon at the level of the tip of the fibula. 3. The tibialis posterior tendon is intact and unremarkable. 4. Remaining ligamentous and tendinous structures are intact.    LOCATION:  [facility]   Dictated by (CST): Jacoby Arango MD on 2/14/2024 at 3:58 PM     Finalized by (CST):  Jacoby Arango MD on 2/14/2024 at 4:01 PM          ASSESSMENT AND PLAN:   Diagnoses and all orders for this visit:    Plantar fasciitis of left foot    Left tibialis posterior tendinitis    Peroneal tendon tear, left, subsequent encounter    Paresthesias    Plantar fasciitis of right foot    Heel pain, bilateral        Plan:   -Patient was seen and evaluated today in clinic.    -Patient unfortunately is continuing to have significant pain to the left rear foot and ankle.  She has been in formal therapy, has tried utilizing KT taping, has had an injection to her tarsal tunnel and plantar fascia, has tried protected weightbearing, and continues to have significant 10/10 pain     -Previous US findings suggestive of chronic plantar fasciitis involving the left medial, central, and lateral bands of the plantar fascia, as well as mild posterior tibialis insertional tendinosis bilaterally without discrete deficits.  Is also noted on the impression of the ultrasound that there was an unremarkable sonographic evaluation of the tarsal tunnel of the left lower extremity    -Discussed left ankle MRI findings, which is consistent with moderate plantar fasciitis, longitudinal split tear of peroneus brevis tendon distal to the fibula, and unremarkable posterior tibial tendon (enlarged navicular tuberosity).    -Clinically, patient continues to have pain to plantar fascia, area of peroneus brevis tendon, and near the insertion of the posterior tibial tendon, all of the left lower extremity.  Previous advanced imaging, ultrasound and MRI, reveal pathologies to all these places.  Patient has not improved with conservative measures, because of this, I do recommend moving forward with surgical intervention at this time.    Recommended surgical treatment: Kayla with PT tendon debridement, repair of peroneus brevis tendon tear, plantar fasciotomy, all left lower extremity    The patient has been advised of the approximate disability  involved for these procedures. In addition, the patient has been advised as to the alternatives of care, including continued conservative care as well as surgical procedures. The patient has failed all conservative treatment at this point. The patient understands that if surgical procedures are performed, there are risks and complications that could occur, including but not limited to: hematoma formation, damage to the nervous system, seroma formation, development of a DVT or phlebitis, infection, painful scar tissue formation, stiffness, limited motion, impaired healing, increased chance of swelling, reaction to implanted biomaterials, over-correction, under-correction with recurrence of the deformities, continued pain, loss of limb, loss of life, and the possibility that future surgery may need to be performed. The patient was given the opportunity to ask questions which were answered. The patient voiced no concerns and will consider all these options. Patient desires surgical intervention. No guarantees were given or implied. Pt consented freely to surgical intervention.  I spent approximately 30 minutes discussing the surgical procedures at length. I reviewed in detail the procedure to be performed, postoperative course, disability to be expected, healing time, prognosis and the importance of their following the recommended postoperative care. Possible complications discussed included but not limited to recurrence of deformity, postoperative pain, swelling, stiffness, rejection of the implant if utilized, return to surgery, infection, residual pain, possible blood clot formation, bleeding, etc. Possible complications relating to over activity and limitations during the postoperative period were reviewed. The patient has responsibilities to help insure successful outcome of the surgery. Postoperative oral and written instructions were reviewed and postoperative course of treatment discussed in detail. Management  of postoperative pain was discussed. All questions related to preoperative, operative and postoperative course of treatment were answered to their understanding and satisfaction. RTO and follow up after surgery is necessary and expected and agreed to by the patient. The patient acknowledged understanding of the above and agrees to follow all instructions and protocols. No guarantee or warranty was given or implied as to the results of the surgery. Consent was reviewed.      Patient's right heel pain is consistent with plantar fasciitis.  Discussed treatment options and patient would like to have a steroid injection into the right heel today.    Plantar fascia injection (CPT: 76384): Right lower extremity  -Discussed steroid injection with patient including benefits and risks.  Patient agrees to injection and written consent was obtained.  -After prepping site with alcohol, administered steroid injection to right plantar heel consisting of 1 cc of 0.5% Marcaine plain, 1 cc of Kenalog 10, and 1 cc of dexamethasone.  Patient tolerated the injection well and there were no complications.  Site was dressed with Band-Aid.    Patient will continue weightbearing as tolerated in a cam boot to the left lower extremity, and recommend supportive shoes at all times to the right lower extremity.  Patient should also start stretching her right posterior muscle group    -The patient indicates understanding of these issues and agrees to the plan.    Time spent reviewing pertinent information from patient's chart, reviewing any pertinent imaging, obtaining history and physical exam, discussing and mutually agreeing on a treatment plan, and documenting encounter: 35 minutes    RTC for 1 week post-op f/u      Isreal Pro DPM        2/16/2024    Dragon speech recognition software was used to prepare this note.  Errors in word recognition may occur.  Please contact me with any questions/concerns with this note.

## 2024-02-16 NOTE — TELEPHONE ENCOUNTER
Patient called back looking for forms from [employer], they were emailed and faxed to us. I looked on Thursday and Friday and did not see them. She was going to call [employer] again.

## 2024-02-16 NOTE — TELEPHONE ENCOUNTER
Type of Leave:   STD Severe Plantar Fasciiatis (Extension)Has been on disability since October.  Reason for Leave:   02/04/24 - 04/04/24  Start date of leave:  2 mths (w/call on Monday -w/surgery will be.  How much time needed?:   Forms Due Date:  Was Fee and Turnaround info Given?:

## 2024-02-17 LAB
.: NORMAL
.: NORMAL

## 2024-02-18 ENCOUNTER — TELEPHONE (OUTPATIENT)
Facility: LOCATION | Age: 54
End: 2024-02-18

## 2024-02-19 NOTE — TELEPHONE ENCOUNTER
S/W patient and advised next avail @ [facility] was 4/2. Patient has requested that I check dates with [facility]. I advised patient I would check avail Thursdays at [facility] and I would call her back with dates and times avail. Patient expressed verbal understanding.

## 2024-02-19 NOTE — TELEPHONE ENCOUNTER
Procedure:   Kidner procedure of left lower extremity  Repair of peroneus brevis tendon tear, left lower extremity  Plantar fasciotomy, left lower extremity   CPT code:   82039  60219  89593  Length of Surgery: 2 hours  Any Instruments/Hardware: Rienzi sonic anchor, 0 ethibond suture  Call patient: ASAP  Anesthesia: Gen  Location: [facility] ([facility] if wait time is a long time)  Assistance: [place] at [facility]  Pacemaker: No  Anticoagulants: No  Nickel Allergy: No  Latex Allergy: No  Diagnosis/ICD Code:   Posterior tibial tendinitis, left  Peroneus brevis tear, left  Plantar fasciitis, left

## 2024-02-20 ENCOUNTER — TELEPHONE (OUTPATIENT)
Dept: PODIATRY CLINIC | Facility: CLINIC | Age: 54
End: 2024-02-20

## 2024-02-20 DIAGNOSIS — M76.72 PERONEUS BREVIS TENDINITIS, LEFT: ICD-10-CM

## 2024-02-20 DIAGNOSIS — M72.2 PLANTAR FASCIITIS, LEFT: ICD-10-CM

## 2024-02-20 DIAGNOSIS — M76.822 POSTERIOR TIBIAL TENDINITIS, LEFT: Primary | ICD-10-CM

## 2024-02-20 NOTE — TELEPHONE ENCOUNTER
Dr. Hernández,    The patient is scheduled for outpatient foot surgery with Dr. Pro on 3/14 for the following procedure(s): [place] procedure of left lower extremity; repair of peroneus brevis tendon tear, left lower extremity; Plantar fasciotomy, left lower extremity.      The patient has been advised to contact your office for pre-operative clearance.  The patient needs the following test/exams    _X___ History and Physical (H&P) may be no older that THIRTY (30) days prior to surgery    __X__ EKG for patients that are age 50 or older, have hypertension, diabetes or a history of cardiac disease or are obese.  This should be no older than 6 months prior to surgery.    __X__ Complete Metabolic Panel (CMP) & CBC     Please include any other test you deem necessary for medical clearance.    Thank you,   Mary PERDOMO  Surgical Scheduler   [phone]

## 2024-02-21 NOTE — TELEPHONE ENCOUNTER
Type of Leave: STD   Reason for Leave:Severe Plantar Fasciiatis (Extension)Has been on disability since October.   Start date of leave: 10/04/23 - SX on 03/14/24  How much time needed?: 02/04/24 - 04/04/24   Forms Due Date:   Was Fee and Turnaround info Given?: yes

## 2024-02-22 NOTE — TELEPHONE ENCOUNTER
Please try to avoid signing forms in the corner as it is not visible when printing and forms are not accepted this way. Thank you!     Dr. Pro     *The ACKNOWLEDGE button has been moved to the top right ribbon*    Please sign off on form if you agree to: Disability   Starting 02/02/24 Ending 04/04/24 RTW 04/05/24 with no restrictions   (place your signature on the first page only)    -From your Inbasket, Highlight the patient and click Chart   -Double click the 02/13/24 Forms Completion telephone encounter  -Scroll down to the Media section   -Click the blue Hyperlink: Disab Dr Pro 02/22/24  -Click Acknowledge located in the top right ribbon/menu   -Drag the mouse into the blank space of the document and a + sign will appear. Left click to   electronically sign the document.     Thank you,     Olamide

## 2024-02-27 NOTE — TELEPHONE ENCOUNTER
Forms E faxed     Efax Date: 2/27/24  Time fax sent: 8: 40 AM  Confirmation time : 10:45 AM  JOB ID: [id number]    Forms completed and signed faxed to [employer] [facility] at [phone]

## 2024-03-05 ENCOUNTER — TELEPHONE (OUTPATIENT)
Dept: FAMILY MEDICINE CLINIC | Facility: CLINIC | Age: 54
End: 2024-03-05

## 2024-03-05 DIAGNOSIS — T75.3XXA MOTION SICKNESS, INITIAL ENCOUNTER: ICD-10-CM

## 2024-03-05 DIAGNOSIS — F32.4 MAJOR DEPRESSIVE DISORDER WITH SINGLE EPISODE, IN PARTIAL REMISSION (HCC): ICD-10-CM

## 2024-03-05 RX ORDER — ONDANSETRON 4 MG/1
4 TABLET, FILM COATED ORAL EVERY 8 HOURS PRN
Qty: 20 TABLET | Refills: 2 | Status: SHIPPED | OUTPATIENT
Start: 2024-03-05

## 2024-03-05 RX ORDER — PAROXETINE 10 MG/1
10 TABLET, FILM COATED ORAL EVERY MORNING
Qty: 30 TABLET | Refills: 1 | Status: SHIPPED | OUTPATIENT
Start: 2024-03-05

## 2024-03-05 RX ORDER — SCOLOPAMINE TRANSDERMAL SYSTEM 1 MG/1
1 PATCH, EXTENDED RELEASE TRANSDERMAL
Qty: 3 PATCH | Refills: 0 | Status: SHIPPED | OUTPATIENT
Start: 2024-03-05 | End: 2024-03-14

## 2024-03-05 NOTE — TELEPHONE ENCOUNTER
Pharmacy called stating they never got the Rx for    ondansetron (ZOFRAN) 4 mg tablet     Scopolamine 1.5mg TD patch 1mg/3days     PARoxetine 10 MG Oral Tab     Pt will be traveling and is in need of this medications to be resubmitted.

## 2024-03-12 ENCOUNTER — TELEPHONE (OUTPATIENT)
Dept: FAMILY MEDICINE CLINIC | Facility: CLINIC | Age: 54
End: 2024-03-12

## 2024-03-12 NOTE — TELEPHONE ENCOUNTER
Pre-admission called the back line and stated patient has a surgery scheduled for 3/14/24 and needs a H&P. Patient does not have an appointment scheduled. PSR please call patient and schedule an appt with Stanislavra.

## 2024-03-13 ENCOUNTER — OFFICE VISIT (OUTPATIENT)
Dept: FAMILY MEDICINE CLINIC | Facility: CLINIC | Age: 54
End: 2024-03-13
Payer: COMMERCIAL

## 2024-03-13 VITALS
DIASTOLIC BLOOD PRESSURE: 70 MMHG | RESPIRATION RATE: 16 BRPM | SYSTOLIC BLOOD PRESSURE: 110 MMHG | HEART RATE: 80 BPM | OXYGEN SATURATION: 98 % | BODY MASS INDEX: 31.92 KG/M2 | HEIGHT: 66.5 IN | WEIGHT: 201 LBS

## 2024-03-13 DIAGNOSIS — M72.2 PLANTAR FASCIITIS OF LEFT FOOT: ICD-10-CM

## 2024-03-13 DIAGNOSIS — Z01.818 PREOPERATIVE GENERAL PHYSICAL EXAMINATION: Primary | ICD-10-CM

## 2024-03-13 PROCEDURE — 3008F BODY MASS INDEX DOCD: CPT | Performed by: NURSE PRACTITIONER

## 2024-03-13 PROCEDURE — 99214 OFFICE O/P EST MOD 30 MIN: CPT | Performed by: NURSE PRACTITIONER

## 2024-03-13 PROCEDURE — 3074F SYST BP LT 130 MM HG: CPT | Performed by: NURSE PRACTITIONER

## 2024-03-13 PROCEDURE — 3078F DIAST BP <80 MM HG: CPT | Performed by: NURSE PRACTITIONER

## 2024-03-13 NOTE — PROGRESS NOTES
CC: Preop exam.    Minda Stallworth is a 54 year old female who presents for a pre-operative physical exam  By Dr. Morteza paez. Patient is to have Kidner procedure of left lower extremity; repair of peroneus brevis tendon tear, left lower extremity; Plantar fasciotomy, left lower extremity   to be on 3/14/24.    No prior anaesthesia problem.    Current Outpatient Medications   Medication Sig Dispense Refill    ondansetron (ZOFRAN) 4 mg tablet Take 1 tablet (4 mg total) by mouth every 8 (eight) hours as needed for Nausea. 20 tablet 2    PARoxetine 10 MG Oral Tab Take 1 tablet (10 mg total) by mouth every morning. 30 tablet 1    Scopolamine 1.5mg TD patch 1mg/3days Place 1 patch onto the skin every third day for 9 days. 3 patch 0    ergocalciferol 1.25 MG (62526 UT) Oral Cap Take 1 capsule (50,000 Units total) by mouth once a week. 12 capsule 0    Fezolinetant (VEOZAH) 45 MG Oral Tab Take 45 mg by mouth daily. 30 tablet 5    topiramate 100 MG Oral Tab Take 1 tablet (100 mg total) by mouth 2 (two) times daily. 180 tablet 0    SUMAtriptan Succinate 100 MG Oral Tab Take 1 tablet (100 mg total) by mouth every 2 (two) hours as needed for Migraine. 9 tablet 5    valACYclovir 500 MG Oral Tab Take 1 tablet (500 mg total) by mouth daily. 90 tablet 1    HYDROcodone-acetaminophen 5-325 MG Oral Tab Take 1 tablet by mouth every 6 (six) hours as needed for Pain. 15 tablet 0    triamcinolone 0.1 % External Cream Apply topically 2 (two) times daily as needed. 60 g 1      Allergies: No Known Allergies   Past Medical History:   Diagnosis Date    Abnormal uterine bleeding     Amenorrhea     Arthritis 03/12/2020    Decorative tattoo     Depression     Headache(784.0)     migraine    Human papilloma virus infection     Hx of motion sickness     Migraines     Plantar fasciitis, bilateral       Past Surgical History:   Procedure Laterality Date    APPENDECTOMY  05/25/2023    COLONOSCOPY      COLPOSCOPY, CERVIX W/UPPER ADJACENT VAGINA;  W/BIOPSY(S), CERVIX      DILATION/CURETTAGE,DIAGNOSTIC        1991      Family History   Problem Relation Age of Onset    Heart Attack Father         Heart Attack    Heart Disorder Father     Breast Cancer Maternal Grandmother 72    Cancer Maternal Grandmother     Neurological Disorder Mother         Alzheimers    Dementia Mother       Social History:   Social History     Socioeconomic History    Marital status:    Tobacco Use    Smoking status: Former     Packs/day: 1.00     Years: 22.00     Additional pack years: 0.00     Total pack years: 22.00     Types: Cigarettes     Quit date: 5/15/2013     Years since quitting: 10.8    Smokeless tobacco: Never   Vaping Use    Vaping Use: Never used   Substance and Sexual Activity    Alcohol use: Yes     Comment: Occasion    Drug use: No    Sexual activity: Yes     Partners: Male     Birth control/protection: Tubal Ligation   Other Topics Concern    Caffeine Concern Yes    Exercise No    Seat Belt Yes    Special Diet No    Stress Concern No    Weight Concern No        REVIEW OF SYSTEMS:   GENERAL: feels well otherwise  SKIN: denies any unusual skin lesions  EYES:denies blurred vision or double vision  HEENT: denies nasal congestion, sinus pain or ST  LUNGS: denies shortness of breath with exertion  CARDIOVASCULAR: denies chest pain on exertion  GI: denies abdominal pain,denies heartburn  : no abnormal discharge  MUSCULOSKELETAL: denies back pain  NEURO: denies headaches  PSYCHE: denies depression or anxiety  HEMATOLOGIC: denies hx of anemia  ENDOCRINE: denies thyroid history  ALL/ASTHMA: denies hx of allergy or asthma    EXAM:   /70   Pulse 80   Resp 16   Ht 5' 6.5\" (1.689 m)   Wt 201 lb (91.2 kg)   SpO2 98%   BMI 31.96 kg/m²   GENERAL: well developed, well nourished,in no apparent distress  SKIN: no rashes,no suspicious lesions  HEENT: atraumatic, normocephalic,ears and throat are clear  EYES:PERRLA, EOMI, normal optic disk,conjunctiva  are clear  NECK: supple,no adenopathy,no bruits  CHEST: no chest tenderness  LUNGS: clear to auscultation  CARDIO: RRR without murmur  GI: good BS's,no masses, HSM or tenderness  MUSCULOSKELETAL: back is not tender,FROM of the back  EXTREMITIES: no cyanosis, clubbing or edema  NEURO: Oriented times three,cranial nerves are intact,motor and sensory are grossly intact    ASSESSMENT AND PLAN:   Minda Stallworth is a 54 year old female who presents for a pre-operative physical exam , patient is approved for surgery.   Diagnoses and all orders for this visit:    Preoperative general physical examination    Plantar fasciitis of left foot  Stable   Patient approved for surgery   F/u for worsening symptoms

## 2024-03-14 ENCOUNTER — APPOINTMENT (OUTPATIENT)
Dept: GENERAL RADIOLOGY | Facility: HOSPITAL | Age: 54
End: 2024-03-14
Attending: PODIATRIST
Payer: COMMERCIAL

## 2024-03-14 ENCOUNTER — HOSPITAL ENCOUNTER (OUTPATIENT)
Facility: HOSPITAL | Age: 54
Setting detail: HOSPITAL OUTPATIENT SURGERY
Discharge: HOME | End: 2024-03-14
Attending: PODIATRIST | Admitting: PODIATRIST
Payer: COMMERCIAL

## 2024-03-14 ENCOUNTER — ANESTHESIA (OUTPATIENT)
Dept: SURGERY | Facility: HOSPITAL | Age: 54
End: 2024-03-14
Payer: COMMERCIAL

## 2024-03-14 ENCOUNTER — ANESTHESIA EVENT (OUTPATIENT)
Dept: SURGERY | Facility: HOSPITAL | Age: 54
End: 2024-03-14
Payer: COMMERCIAL

## 2024-03-14 VITALS
RESPIRATION RATE: 16 BRPM | WEIGHT: 206 LBS | HEIGHT: 66.5 IN | DIASTOLIC BLOOD PRESSURE: 102 MMHG | HEART RATE: 76 BPM | TEMPERATURE: 99 F | BODY MASS INDEX: 32.72 KG/M2 | OXYGEN SATURATION: 96 % | SYSTOLIC BLOOD PRESSURE: 120 MMHG

## 2024-03-14 DIAGNOSIS — Z98.890 POST-OPERATIVE STATE: Primary | ICD-10-CM

## 2024-03-14 PROCEDURE — 76000 FLUOROSCOPY <1 HR PHYS/QHP: CPT | Performed by: PODIATRIST

## 2024-03-14 PROCEDURE — 28238 REVISION OF FOOT TENDON: CPT | Performed by: PODIATRIST

## 2024-03-14 PROCEDURE — 28008 INCISION OF FOOT FASCIA: CPT | Performed by: PODIATRIST

## 2024-03-14 PROCEDURE — 0LQW0ZZ REPAIR LEFT FOOT TENDON, OPEN APPROACH: ICD-10-PCS | Performed by: GENERAL ACUTE CARE HOSPITAL

## 2024-03-14 PROCEDURE — 76942 ECHO GUIDE FOR BIOPSY: CPT | Performed by: ANESTHESIOLOGY

## 2024-03-14 PROCEDURE — 0J8R0ZZ DIVISION OF LEFT FOOT SUBCUTANEOUS TISSUE AND FASCIA, OPEN APPROACH: ICD-10-PCS | Performed by: GENERAL ACUTE CARE HOSPITAL

## 2024-03-14 PROCEDURE — 28200 REPAIR OF FOOT TENDON: CPT | Performed by: PODIATRIST

## 2024-03-14 DEVICE — SONICANCHOR KIT
Type: IMPLANTABLE DEVICE | Site: FOOT | Status: FUNCTIONAL
Brand: SONICANCHOR

## 2024-03-14 RX ORDER — HYDROCODONE BITARTRATE AND ACETAMINOPHEN 5; 325 MG/1; MG/1
1 TABLET ORAL ONCE AS NEEDED
Status: COMPLETED | OUTPATIENT
Start: 2024-03-14 | End: 2024-03-14

## 2024-03-14 RX ORDER — MIDAZOLAM HYDROCHLORIDE 1 MG/ML
INJECTION INTRAMUSCULAR; INTRAVENOUS AS NEEDED
Status: DISCONTINUED | OUTPATIENT
Start: 2024-03-14 | End: 2024-03-14 | Stop reason: SURG

## 2024-03-14 RX ORDER — DEXAMETHASONE SODIUM PHOSPHATE 4 MG/ML
VIAL (ML) INJECTION AS NEEDED
Status: DISCONTINUED | OUTPATIENT
Start: 2024-03-14 | End: 2024-03-14 | Stop reason: SURG

## 2024-03-14 RX ORDER — SODIUM CHLORIDE, SODIUM LACTATE, POTASSIUM CHLORIDE, CALCIUM CHLORIDE 600; 310; 30; 20 MG/100ML; MG/100ML; MG/100ML; MG/100ML
INJECTION, SOLUTION INTRAVENOUS CONTINUOUS
Status: DISCONTINUED | OUTPATIENT
Start: 2024-03-14 | End: 2024-03-14

## 2024-03-14 RX ORDER — HYDROCODONE BITARTRATE AND ACETAMINOPHEN 5; 325 MG/1; MG/1
1 TABLET ORAL EVERY 4 HOURS PRN
Qty: 40 TABLET | Refills: 0 | Status: SHIPPED | OUTPATIENT
Start: 2024-03-14 | End: 2024-03-21

## 2024-03-14 RX ORDER — KETOROLAC TROMETHAMINE 30 MG/ML
INJECTION, SOLUTION INTRAMUSCULAR; INTRAVENOUS AS NEEDED
Status: DISCONTINUED | OUTPATIENT
Start: 2024-03-14 | End: 2024-03-14 | Stop reason: SURG

## 2024-03-14 RX ORDER — HYDROMORPHONE HYDROCHLORIDE 1 MG/ML
0.2 INJECTION, SOLUTION INTRAMUSCULAR; INTRAVENOUS; SUBCUTANEOUS EVERY 5 MIN PRN
Status: DISCONTINUED | OUTPATIENT
Start: 2024-03-14 | End: 2024-03-14

## 2024-03-14 RX ORDER — ACETAMINOPHEN 500 MG
1000 TABLET ORAL ONCE
Status: DISCONTINUED | OUTPATIENT
Start: 2024-03-14 | End: 2024-03-14 | Stop reason: HOSPADM

## 2024-03-14 RX ORDER — LIDOCAINE HYDROCHLORIDE 10 MG/ML
INJECTION, SOLUTION EPIDURAL; INFILTRATION; INTRACAUDAL; PERINEURAL AS NEEDED
Status: DISCONTINUED | OUTPATIENT
Start: 2024-03-14 | End: 2024-03-14 | Stop reason: SURG

## 2024-03-14 RX ORDER — NALOXONE HYDROCHLORIDE 0.4 MG/ML
0.08 INJECTION, SOLUTION INTRAMUSCULAR; INTRAVENOUS; SUBCUTANEOUS AS NEEDED
Status: DISCONTINUED | OUTPATIENT
Start: 2024-03-14 | End: 2024-03-14

## 2024-03-14 RX ORDER — HYDROMORPHONE HYDROCHLORIDE 1 MG/ML
0.6 INJECTION, SOLUTION INTRAMUSCULAR; INTRAVENOUS; SUBCUTANEOUS EVERY 5 MIN PRN
Status: DISCONTINUED | OUTPATIENT
Start: 2024-03-14 | End: 2024-03-14

## 2024-03-14 RX ORDER — SCOLOPAMINE TRANSDERMAL SYSTEM 1 MG/1
1 PATCH, EXTENDED RELEASE TRANSDERMAL ONCE
Status: DISCONTINUED | OUTPATIENT
Start: 2024-03-14 | End: 2024-03-14 | Stop reason: HOSPADM

## 2024-03-14 RX ORDER — ONDANSETRON 2 MG/ML
4 INJECTION INTRAMUSCULAR; INTRAVENOUS EVERY 6 HOURS PRN
Status: DISCONTINUED | OUTPATIENT
Start: 2024-03-14 | End: 2024-03-14

## 2024-03-14 RX ORDER — HYDROCODONE BITARTRATE AND ACETAMINOPHEN 5; 325 MG/1; MG/1
2 TABLET ORAL ONCE AS NEEDED
Status: COMPLETED | OUTPATIENT
Start: 2024-03-14 | End: 2024-03-14

## 2024-03-14 RX ORDER — PROCHLORPERAZINE EDISYLATE 5 MG/ML
5 INJECTION INTRAMUSCULAR; INTRAVENOUS EVERY 8 HOURS PRN
Status: DISCONTINUED | OUTPATIENT
Start: 2024-03-14 | End: 2024-03-14

## 2024-03-14 RX ORDER — ACETAMINOPHEN 500 MG
1000 TABLET ORAL ONCE AS NEEDED
Status: COMPLETED | OUTPATIENT
Start: 2024-03-14 | End: 2024-03-14

## 2024-03-14 RX ORDER — ONDANSETRON 2 MG/ML
INJECTION INTRAMUSCULAR; INTRAVENOUS AS NEEDED
Status: DISCONTINUED | OUTPATIENT
Start: 2024-03-14 | End: 2024-03-14 | Stop reason: SURG

## 2024-03-14 RX ORDER — CEFAZOLIN SODIUM/WATER 2 G/20 ML
2 SYRINGE (ML) INTRAVENOUS ONCE
Status: COMPLETED | OUTPATIENT
Start: 2024-03-14 | End: 2024-03-14

## 2024-03-14 RX ORDER — HYDROMORPHONE HYDROCHLORIDE 1 MG/ML
0.4 INJECTION, SOLUTION INTRAMUSCULAR; INTRAVENOUS; SUBCUTANEOUS EVERY 5 MIN PRN
Status: DISCONTINUED | OUTPATIENT
Start: 2024-03-14 | End: 2024-03-14

## 2024-03-14 RX ADMIN — ONDANSETRON 4 MG: 2 INJECTION INTRAMUSCULAR; INTRAVENOUS at 09:51:00

## 2024-03-14 RX ADMIN — LIDOCAINE HYDROCHLORIDE 50 MG: 10 INJECTION, SOLUTION EPIDURAL; INFILTRATION; INTRACAUDAL; PERINEURAL at 07:03:00

## 2024-03-14 RX ADMIN — KETOROLAC TROMETHAMINE 30 MG: 30 INJECTION, SOLUTION INTRAMUSCULAR; INTRAVENOUS at 09:51:00

## 2024-03-14 RX ADMIN — CEFAZOLIN SODIUM/WATER 2 G: 2 G/20 ML SYRINGE (ML) INTRAVENOUS at 06:59:00

## 2024-03-14 RX ADMIN — MIDAZOLAM HYDROCHLORIDE 2 MG: 1 INJECTION INTRAMUSCULAR; INTRAVENOUS at 07:00:00

## 2024-03-14 RX ADMIN — DEXAMETHASONE SODIUM PHOSPHATE 4 MG: 4 MG/ML VIAL (ML) INJECTION at 07:36:00

## 2024-03-14 NOTE — OPERATIVE REPORT
[facility]   [facility]     OPERATIVE NOTE     Minda Stallworth Patient Status:  Hospital Outpatient Surgery    3/13/1970 MRN [id number]   Location [facility] PERIOPERATIVE SERVICE Attending No att. providers found   Hosp Day # 0 PCP ERIC CARSON MD     Date of Surgery:  3/14/2024     Preoperative Diagnosis:   Posterior tibial tendinitis, left [M76.822]  Accessory navicular of left foot  Peroneus brevis tendinitis, left [M76.72]  Plantar fasciitis, left [M72.2]    Postoperative Diagnosis:   Posterior tibial tendinitis, left [M76.822]  Accessory navicular of left foot  Peroneus brevis tendinitis, left [M76.72]  Plantar fasciitis, left [M72.2]    Primary Surgeon: Isreal Pro DPM    Assistant: None    Procedures:   Kidner procedure of left lower extremity  Repair of peroneus brevis tendon tear, left lower extremity  Plantar fasciotomy of left lower extremity    Surgical Findings:   Medial aspect of navicular tuberosity at the insertion of the PT tendon did appear slightly hypertrophic and prominent.  The tendon itself appeared intact with no pathology visualized.  The plantar fascia at the insertion on the calcaneus was severely thickened.  The peroneal tendon interstitial tear started just distal to lateral malleolus and proximally approximately 10 cm in length.  Distal half of the tear did have 2 longitudinal tears.     Anesthesia: General    Complications: none    Estimated Blood Loss: Blood Output: 15 mL (3/14/2024 10:04 AM)      Implants:   Implant Name Type Inv. Item Serial No.  Lot No. LRB No. Used Action   KIT ANCHR 2.5X10MM SFT TISS PLDLLA - SNA  KIT ANCHR 2.5X10MM SFT TISS PLDLLA NA [employer]  [id number] Left 1 Implanted   KIT ANCHR 2.5X10MM SFT TISS PLDLLA - SNA  KIT ANCHR 2.5X10MM SFT TISS PLDLLA NA [employer]  [id number] Left 1 Implanted       Specimen: * No specimens in log *    Drains: None    Condition: Stable      Preoperative history/indications:  Patient  presented to Isreal Pro DPM due to significant amount of pain involving her left rear foot.  All preoperative conservative care have failed to resolve the patient's pain and discomfort.  MRI findings were consistent with moderate plantar fasciitis, longitudinal split tear of peroneus brevis tendon distal to the fibula, and unremarkable posterior tibial tendon (enlarged navicular tuberosity noted on radiograph).  US findings were suggestive of chronic plantar fasciitis involving the left medial, central, and lateral bands of the plantar fascia, as well as mild posterior tibialis insertional tendinosis bilaterally without discrete deficits. The procedure was discussed with patient in detail.  All of their questions and concerns were answered and the patient would like to proceed with surgical intervention.    Informed Consent:  All treatment options have been discussed with the patient of both conservative and surgical attempt at correction including the potential risks and complications of surgical intervention including but not exhaustive of death, loss of limb, post op pain, swelling, infection, bleeding, reoccurrence of the deformity, rotation, elevation, malposition, extended healing, non-union or delayed union and the possibility of further and future surgery.  No guarantees have been made to the pt and the informed consent has been signed.    Procedure in detail:  The patient was brought into the operating room and placed on the operating table in the supine position.  Anesthesia provided a popliteal and adductor regional block to the left lower extremity.  Pre-operative antibiotics were given per SCIP protocols.  A pneumatic tourniquet was placed about the patient's left thigh.  A formal timeout was performed and the OR staff were all in agreement. Following IV sedation, the left lower extremity was then scrubbed, prepped, and draped in the usual aseptic manner.  The left lower extremity was then  exsanguinated and the pneumatic tourniquet was inflated.  Attn was directed to the medial left foot where an incision was made 1cm ant to the tip of the medial malleolus and continued distally along the dorsal aspect of the posterior tibial tendon to the medial cuneiform.  Incision was deepened through the subcutaneous tissues with care being taken to identify and retract all vital neurovascular structures.  The dissection was carried down to the superior border of the TP tendon along the length of the incision from proximal to distal until the tendon was observed coursing plantarward beneath the navicular.    The TP tendon was detached as far distally and dorsally as possible and the large navicular tuberosity was osteotomized and resected.  Following resection, of the most distal portion of the posterior tibial tendon prior to his surgery was examined.  There is minimal enlargement noted and the tendon itself appeared of normal morphology with no tearing noted.  A flush was then performed to the incision site.  Next, 2 [employer] sonic anchors were placed into the medial aspect of the navicular tuberosity, one plantarly and one dorsally per manufacture protocol.  The foot was placed in inversion and the FiberWire from the sonic anchors were utilized to reanchor the posterior tibial tendon to the resected portion of the navicular tuberosity.  Adequate reapproximation of the tendon onto its insertion was noted after fixation with appropriate tautness of the tendon.  The incision site was then flushed with copious amounts of normal sterile saline and subcutaneous tissue reapproximated utilizing 4-0 Monocryl.  Next, was directed to the medial inferior aspect of the left calcaneus where a vertical incision was made overlying the medial calcaneal tubercle.  Blunt dissection was then carried down to the PF ligament.  A fascial elevator was then inserted to free the plantar fascia superiorly and inferiorly.  The plantar  fascia was then visualized through the incision site near its most plantar medial insertion.  The fascia itself was noted to be thickened.  Next, utilizing Caruso scissors, the medial one half of the plantar fascia was released with the ankle and digits in maximal dorsiflexion.  Improvement in the tautness of the plantar fascia is noted upon release.  After adequate enough release of the plantar fascia was determined, the incision site was then flushed with copious amounts of sterile saline and reapproximated in layered fashion utilizing 2-0 Vicryl for subcutaneous closure, 4-0 Monocryl for subcuticular closure, and 4-0 nylon for skin reapproximation.  Lastly, attention was directed to the lateral aspect of the rear foot where a curvilinear incision was placed just proximal to the fifth metatarsal base proximally along the course of the peroneus brevis tendon around the distal aspect of the lateral malleolus.  The incision was bluntly deepened with care to retract the sural nerve.  All veins either retracted or cauterized appropriately.  The incision was deepened to the level of the peroneus brevis tendon sheath.  Once visualizing the tendon sheath, a small nick was placed into the distal sheath utilizing tenotomy scissors.  The peroneus brevis tendon sheath was then opened proximally with care to avoid the tendon itself.  The peroneus brevis tendon was then inspected and noted to have a longitudinal split tear to the medial/deeper aspect of the tendon starting just proximal to its insertion on the fifth metatarsal base proximally around the lateral malleolus.  There was also noted to be a cyst and second small longitudinal split tear of the tendon to the distal half of the exposed tendon.  The tendon itself appeared otherwise healthy.  Next, utilizing 2-0 FiberWire, the tendon tears were debrided and retubularized.  Following repair of the peroneus brevis tendon tear, it was also noted that the tendon itself had a  low-lying muscle belly.  Debridement of the muscle belly was performed so that no muscle belly would be traveling distal to the lateral malleolus.  Following repair, peroneus longus tendon was inspected and no tears or thickening was noted to the tendon.  It overall appeared healthy.  The incision site was then flushed with copious amounts of sterile normal saline and the tendon sheath was then reapproximated utilizing 2-0 Vicryl and running interlocking fashion.  Incision site was then closed in layered fashion utilizing 4-0 Monocryl for subdermal closure.  Staples were utilized for the lateral and medial incisions.  Upon completion of the procedure, the pneumatic tourniquet was deflated and a prompt hyperemic response was noted to the surgical extremity.  The incision was dressed with Betadine soaked Adaptic and covered with sterile compressive dressings consisting of 4 x 4 gauze, ABD pads, Kerlix, and cast padding.  A well-padded posterior splint and ace wrap was then applied to the surgical extremity.  The pt tolerated the procedure and anesthesia well.  They were transferred to the recovery room with VSS and vascular status intact to surgical extremity.  Following a period of postoperative monitoring, the patient will be discharged home on the following written and oral postop instructions: keep dressings C/D/I, remain nonweightbearing to surgical extremity in posterior splint, ice and elevate surgical extremity when at rest.  Patient was prescribed Norco for pain.  Patient will contact my office for all postoperative f/u care and should any problems arise.      Isreal Pro DPM   3/14/2024

## 2024-03-14 NOTE — DISCHARGE INSTRUCTIONS
Please keep dressings clean, dry, and intact until follow-up visit.  Remain non-weightbearing in posterior splint utilizing walker/crutches/knee scooter  Ice behind the knee and elevate surgical extremity frequently  Take medications as prescribed  Contact office with any questions/concerns  Follow-up with Dr. Pro within 1 week.    You received a drug called Toradol which is an Anti Inflammatory at: 09:51am  If you are allowed to take Anti inflammatories:    Do not take any Anti Inflammatory like Motrin, Aleve or Ibuprophen until after: 3:51pm  Please report any suspected allergic reactions or bleeding issues to your doctor

## 2024-03-14 NOTE — ANESTHESIA PROCEDURE NOTES
Airway  Date/Time: 3/14/2024 7:04 AM  Urgency: elective      General Information and Staff    Patient location during procedure: OR  Anesthesiologist: Pranav Garvin MD  Performed: anesthesiologist   Performed by: Pranav Garvin MD  Authorized by: Pranav Garvin MD      Indications and Patient Condition  Indications for airway management: anesthesia  Sedation level: deep  Preoxygenated: yes  Patient position: sniffing  Mask difficulty assessment: 1 - vent by mask    Final Airway Details  Final airway type: supraglottic airway      Successful airway: classic  Size 3       Number of attempts at approach: 1

## 2024-03-14 NOTE — ANESTHESIA PROCEDURE NOTES
Regional Block    Date/Time: 3/14/2024 7:00 AM    Performed by: Pranav Garvin MD  Authorized by: Pranav Garvin MD      General Information and Staff    Start Time:  3/14/2024 7:00 AM  End Time:  3/14/2024 7:02 AM  Anesthesiologist:  Pranav Garvin MD  Performed by:  Anesthesiologist  Patient Location:  OR    Block Placement: Post Induction  Site Identification: real time ultrasound guided and image stored and retrievable    Block site/laterality marked before start: site marked  Reason for Block: at surgeon's request and post-op pain management    Preanesthetic Checklist: 2 patient identifers, IV checked, risks and benefits discussed, monitors and equipment checked, pre-op evaluation, timeout performed, anesthesia consent, sterile technique used, no prohibitive neurological deficits and no local skin infection at insertion site      Procedure Details    Patient Position:  Supine  Prep: ChloraPrep    Monitoring:  Cardiac monitor, continuous pulse ox and blood pressure cuff  Block Type:  Popliteal  Laterality:  Left  Injection Technique:  Single-shot    Needle    Needle Type:  Short-bevel and echogenic  Needle Gauge:  21 G  Needle Length:  110 mm  Needle Localization:  Ultrasound guidance  Reason for Ultrasound Use: appropriate spread of the medication was noted in real time and no ultrasound evidence of intravascular and/or intraneural injection            Assessment    Injection Assessment:  Good spread noted, negative resistance, negative aspiration for heme, incremental injection, low pressure, local visualized surrounding nerve on ultrasound and no pain on injection  Paresthesia Pain:  None  Heart Rate Change: No    - Patient tolerated block procedure well without evidence of immediate block related complications.     Medications  3/14/2024 7:00 AM      Additional Comments    Medication:  Bupivacaine 0.375% 30mL  with 1:200,000 epi + 2 mg pf dexamethasone + 50 mcg clonidine

## 2024-03-14 NOTE — BRIEF OP NOTE
Pre-Operative Diagnosis:   Posterior tibial tendinitis, left [M76.822]  Accessory navicular, left  Peroneus brevis tendinitis, left [M76.72]  Plantar fasciitis, left [M72.2]     Post-Operative Diagnosis:   Posterior tibial tendinitis, left [M76.822]  Accessory navicular, left  Peroneus brevis tendinitis, left [M76.72]  Plantar fasciitis, left [M72.2]      Procedure Performed:   Kidner procedure of left lower extremity; repair of peroneus brevis tendon tear, left lower extremity; Plantar fasciotomy, left lower extremity    Surgeon(s) and Role:     * Jose Pro DPM - Primary    Assistant(s):  None     Surgical Findings: Medial aspect of navicular tuberosity at the insertion of the PT tendon did appear slightly hypertrophic and prominent.  The tendon itself appeared intact with no pathology visualized.  The plantar fascia at the insertion on the calcaneus was severely thickened.  The peroneal tendon interstitial tear started just distal to lateral malleolus and proximally approximately 10 cm in length.  Distal half of the tear did have 2 longitudinal tears.      Specimen: None     Estimated Blood Loss: Blood Output: 15 mL (3/14/2024 10:04 AM)      Dictation Number:  See Carolyn Pro DPM  3/14/2024  10:28 AM

## 2024-03-14 NOTE — ANESTHESIA PROCEDURE NOTES
[facility]ional [facility]    Date/Time: 3/14/2024 7:02 AM    Performed by: Pranav Garvin MD  Authorized by: Pranav Garvin MD      General Information and Staff    Start Time:  3/14/2024 7:02 AM  End Time:  3/14/2024 7:03 AM  Anesthesiologist:  Pranav Garvin MD  Performed by:  Anesthesiologist  Patient Location:  OR    Block Placement: Post Induction  Site Identification: real time ultrasound guided and image stored and retrievable    Block site/laterality marked before start: site marked  Reason for Block: at surgeon's request and post-op pain management    Preanesthetic Checklist: 2 patient identifers, IV checked, risks and benefits discussed, monitors and equipment checked, pre-op evaluation, timeout performed, anesthesia consent, sterile technique used, no prohibitive neurological deficits and no local skin infection at insertion site      Procedure Details    Patient Position:  Supine  Prep: ChloraPrep    Monitoring:  Cardiac monitor, continuous pulse ox and blood pressure cuff  Block Type:  Adductor canal  Laterality:  Left  Injection Technique:  Single-shot    Needle    Needle Type:  Short-bevel and echogenic  Needle Gauge:  21 G  Needle Length:  110 mm  Needle Localization:  Ultrasound guidance  Reason for Ultrasound Use: appropriate spread of the medication was noted in real time and no ultrasound evidence of intravascular and/or intraneural injection            Assessment    Injection Assessment:  Good spread noted, negative resistance, negative aspiration for heme, incremental injection, low pressure, local visualized surrounding nerve on ultrasound and no pain on injection  Paresthesia Pain:  None  Heart Rate Change: No    - Patient tolerated block procedure well without evidence of immediate block related complications.     Medications  3/14/2024 7:02 AM      Additional Comments    Medication:  Bupivacaine 0.375% 15mL with 1:200,000 epi + 2 mg pf dexamethasone

## 2024-03-14 NOTE — ANESTHESIA PREPROCEDURE EVALUATION
PRE-OP EVALUATION    Patient Name: Minda Stallworth    Admit Diagnosis: Posterior tibial tendinitis, left [M76.822]  Peroneus brevis tendinitis, left [M76.72]  Plantar fasciitis, left [M72.2]    Pre-op Diagnosis: Posterior tibial tendinitis, left [M76.822]  Peroneus brevis tendinitis, left [M76.72]  Plantar fasciitis, left [M72.2]    Kidner procedure of left lower extremity; repair of peroneus brevis tendon tear, left lower extremity; Plantar fasciotomy, left lower extremity    Anesthesia Procedure: Kidner procedure of left lower extremity; repair of peroneus brevis tendon tear, left lower extremity; Plantar fasciotomy, left lower extremity (Left)    Surgeon(s) and Role:     * Jose Pro DPM - Primary    Pre-op vitals reviewed.  Temp: 98 °F (36.7 °C)  Pulse: 78  Resp: 18  BP: 110/70  SpO2: 98 %  Body mass index is 32.75 kg/m².    Current medications reviewed.  Hospital Medications:   acetaminophen (Tylenol Extra Strength) tab 1,000 mg  1,000 mg Oral Once    scopolamine (Transderm-Scop) 1 MG/3DAYS patch 1 patch  1 patch Transdermal Once    lactated ringers infusion   Intravenous Continuous    ceFAZolin (Ancef) 2 g in 20mL IV syringe premix  2 g Intravenous Once       Outpatient Medications:     Facility-Administered Medications Prior to Admission   Medication Dose Route Frequency Provider Last Rate Last Admin    [COMPLETED] Dexamethasone (Decadron) 10 MG/ML injection 10 mg  10 mg Intramuscular Once Jose Pro DPM   10 mg at 02/16/24 0835    [COMPLETED] triamcinolone acetonide (Kenalog-10) 10 mg/mL injection  10 mg Intramuscular Once Jose Pro DPM   10 mg at 02/16/24 1217    Dexamethasone (Decadron) 10 MG/ML injection 10 mg  10 mg Intramuscular Once Jose Pro DPM        triamcinolone acetonide (Kenalog-40) 40 MG/ML injection 40 mg  40 mg Intra-articular Once Jose Pro DPM         Medications Prior to Admission   Medication Sig Dispense Refill Last Dose    PARoxetine 10 MG Oral Tab Take 1  tablet (10 mg total) by mouth every morning. 30 tablet 1 3/14/2024 at 0900    ergocalciferol 1.25 MG (41409 UT) Oral Cap Take 1 capsule (50,000 Units total) by mouth once a week. 12 capsule 0 Past Week    Fezolinetant (VEOZAH) 45 MG Oral Tab Take 45 mg by mouth daily. 30 tablet 5 3/14/2024 at 0500    topiramate 100 MG Oral Tab Take 1 tablet (100 mg total) by mouth 2 (two) times daily. 180 tablet 0 3/14/2024 at 0800    valACYclovir 500 MG Oral Tab Take 1 tablet (500 mg total) by mouth daily. 90 tablet 1 3/14/2024 at 0400    triamcinolone 0.1 % External Cream Apply topically 2 (two) times daily as needed. 60 g 1 Past Week    ondansetron (ZOFRAN) 4 mg tablet Take 1 tablet (4 mg total) by mouth every 8 (eight) hours as needed for Nausea. 20 tablet 2 Unknown    Scopolamine 1.5mg TD patch 1mg/3days Place 1 patch onto the skin every third day for 9 days. 3 patch 0 Unknown    SUMAtriptan Succinate 100 MG Oral Tab Take 1 tablet (100 mg total) by mouth every 2 (two) hours as needed for Migraine. 9 tablet 5 Unknown    HYDROcodone-acetaminophen 5-325 MG Oral Tab Take 1 tablet by mouth every 6 (six) hours as needed for Pain. 15 tablet 0 Unknown       Allergies: Patient has no known allergies.      Anesthesia Evaluation    Patient summary reviewed.    Anesthetic Complications           GI/Hepatic/Renal      (+) GERD                           Cardiovascular      ECG reviewed.  Exercise tolerance: good     MET: >4                                           Endo/Other                           (+) arthritis       Pulmonary    Negative pulmonary ROS.                       Neuro/Psych      (+) depression                                Past Surgical History:   Procedure Laterality Date    APPENDECTOMY  2023    COLONOSCOPY      COLPOSCOPY, CERVIX W/UPPER ADJACENT VAGINA; W/BIOPSY(S), CERVIX      DILATION/CURETTAGE,DIAGNOSTIC        1991     Social History     Socioeconomic History    Marital status:     Tobacco Use    Smoking status: Former     Packs/day: 1.00     Years: 22.00     Additional pack years: 0.00     Total pack years: 22.00     Types: Cigarettes     Quit date: 5/15/2013     Years since quitting: 10.8    Smokeless tobacco: Never   Vaping Use    Vaping Use: Never used   Substance and Sexual Activity    Alcohol use: Yes     Comment: Occasion    Drug use: No    Sexual activity: Yes     Partners: Male     Birth control/protection: Tubal Ligation   Other Topics Concern    Caffeine Concern Yes    Exercise No    Seat Belt Yes    Special Diet No    Stress Concern No    Weight Concern No     History   Drug Use No     Available pre-op labs reviewed.  Lab Results   Component Value Date    WBC 4.7 02/12/2024    RBC 4.25 02/12/2024    HGB 13.6 02/12/2024    HCT 42.4 02/12/2024    MCV 99.8 02/12/2024    MCH 32.0 02/12/2024    MCHC 32.1 02/12/2024    RDW 12.4 02/12/2024    .0 02/12/2024     Lab Results   Component Value Date     02/12/2024    K 4.0 02/12/2024     02/12/2024    CO2 25.0 02/12/2024    BUN 17 02/12/2024    CREATSERUM 0.75 02/12/2024    GLU 93 02/12/2024    CA 9.0 02/12/2024            Airway      Mallampati: II  Mouth opening: >3 FB  TM distance: > 6 cm  Neck ROM: full Cardiovascular    Cardiovascular exam normal.         Dental    Dentition appears grossly intact         Pulmonary    Pulmonary exam normal.                 Other findings              ASA: 2   Plan: general  NPO status verified and patient meets guidelines.    Post-procedure pain management plan discussed with surgeon and patient.      Plan/risks discussed with: patient                Present on Admission:  **None**

## 2024-03-14 NOTE — ANESTHESIA POSTPROCEDURE EVALUATION
[facility]    Minda Stallworth Patient Status:  Hospital Outpatient Surgery   Age/Gender 54 year old female MRN [id number]   Location [facility] SURGERY Attending Jose Pro DPM   Hosp Day # 0 PCP ERIC CARSON MD       Anesthesia Post-op Note    Kidner procedure of left lower extremity; repair of peroneus brevis tendon tear, left lower extremity; Plantar fasciotomy, left lower extremity    Procedure Summary       Date: 03/14/24 Room / Location:  MAIN OR 05 / [facility] MAIN OR    Anesthesia Start: 0659 Anesthesia Stop:     Procedure: Kidner procedure of left lower extremity; repair of peroneus brevis tendon tear, left lower extremity; Plantar fasciotomy, left lower extremity (Left) Diagnosis:       Posterior tibial tendinitis, left      Peroneus brevis tendinitis, left      Plantar fasciitis, left      (Posterior tibial tendinitis, left [M76.822]Peroneus brevis tendinitis, left [M76.72]Plantar fasciitis, left [M72.2])    Surgeons: Jose Pro DPM Anesthesiologist: Pranav Garvin MD    Anesthesia Type: general ASA Status: 2            Anesthesia Type: general    Vitals Value Taken Time   /58 03/14/24 1021   Temp 98.5 °F (36.9 °C) 03/14/24 1021   Pulse 89 03/14/24 1021   Resp 16 03/14/24 1021   SpO2 97 % 03/14/24 1021       Patient Location: Same Day Surgery    Anesthesia Type: general    Airway Patency: patent    Postop Pain Control: adequate    Mental Status: mildly sedated but able to meaningfully participate in the post-anesthesia evaluation    Nausea/Vomiting: none    Cardiopulmonary/Hydration status: stable euvolemic    Complications: no apparent anesthesia related complications    Postop vital signs: stable    Comments: Report given to Allison MARTINO.    Dental Exam: Unchanged from Preop    Patient to be discharged home when criteria met.

## 2024-03-15 ENCOUNTER — TELEPHONE (OUTPATIENT)
Dept: PODIATRY CLINIC | Facility: CLINIC | Age: 54
End: 2024-03-15

## 2024-03-15 NOTE — TELEPHONE ENCOUNTER
Called patient-Message left with office phone number. Will try to reach out to her later  /post op nurse

## 2024-03-16 ENCOUNTER — TELEPHONE (OUTPATIENT)
Dept: PODIATRY CLINIC | Facility: CLINIC | Age: 54
End: 2024-03-16

## 2024-03-18 ENCOUNTER — TELEPHONE (OUTPATIENT)
Dept: PODIATRY CLINIC | Facility: CLINIC | Age: 54
End: 2024-03-18

## 2024-03-18 NOTE — TELEPHONE ENCOUNTER
Message given to patient below from Dr Suarez. Educated her on loosening the ace and reattaching it or if discomfort continues go to the IC /post op nurse

## 2024-03-18 NOTE — TELEPHONE ENCOUNTER
Thank you for the update.  Glad to hear that she is slightly better since speaking with me on Friday.  If pain does not continue to improve/worsens, would recommend patient loosen the Ace bandage or go to urgent care/ER.  Thank you

## 2024-03-18 NOTE — TELEPHONE ENCOUNTER
Procedure date: 3/14/2024  3rd call by post op nurse  Procedure Laterality Anesthesia   Kidner procedure of left lower extremity; repair of peroneus brevis tendon tear, left lower extremity; Plantar fasciotomy, left lower extremity       How are you feeling? / slightly better. Patient had spoke to Dr Pro 3/15/2024    Any bleeding? /no   Is the dressing dry & intact?/ yes   Level of pain? / 8  Character of pain: / pulsating. Doctor told her to loosen the bandage but she is afraid to.  Onset of pain:/ immediately after surgery  Aggravating factors:/ pain constantly  Duration of pain: never goes away  Alleviating factors:elevation all the time / went over icing protocol    Are you taking the prescribed medication? Taking   Medication Quantity Refills Start End   HYDROcodone-acetaminophen 5-325 MG Oral Tab       Alternating with 600mg of advil  Are you following all of the PO instructions? / appetite good    Other Comments:Taking a bowel preparation tea for her bowel movements and I suggested fresh fiber fruits and stool softener and miralax if needed    Follow-up appt  date:/ 3/22/2024     Pt was advised if they have any concerns after hours to call our office and they would be directed to on call physician./ DONE

## 2024-03-19 ENCOUNTER — OFFICE VISIT (OUTPATIENT)
Facility: LOCATION | Age: 54
End: 2024-03-19
Payer: COMMERCIAL

## 2024-03-19 ENCOUNTER — TELEPHONE (OUTPATIENT)
Dept: PODIATRY CLINIC | Facility: CLINIC | Age: 54
End: 2024-03-19

## 2024-03-19 DIAGNOSIS — Z98.890 POST-OPERATIVE STATE: Primary | ICD-10-CM

## 2024-03-19 DIAGNOSIS — M25.472 EDEMA OF LEFT ANKLE: ICD-10-CM

## 2024-03-19 PROCEDURE — 99024 POSTOP FOLLOW-UP VISIT: CPT | Performed by: PODIATRIST

## 2024-03-19 NOTE — TELEPHONE ENCOUNTER
S/w patient- patient is s/p left foot surgery. Patient states that she ordered a shower bag off of [employer]. She states that it helped the first day when she showered, but when she showered today, she got the dressing wet. She states that she air dried the ace bandage and splint. She states that the gauze on dressing got wet, but now appears to be dry. I told her that I would reach out to Dr Pro. I told her that if she is comfortable, we may want her to re-wrap with new ace and gauze from her local pharmacy. She states that she is agreeable to that, but would want guidance on what to wrap it with.    Please advise- patient made it sounds like she has staples/sutures, gauze over the incision, rollgauze, splint and then wrapped with an ace. Would you like her to re-wrap with this? If she removes gauze, would you like her to put anything on the incision (I.e. betadine)?

## 2024-03-19 NOTE — TELEPHONE ENCOUNTER
I would advise patient to come in for a dressing change if she is worried the deeper dressings got wet. I don’t want incisions getting wet. If everything is dry, she should place roll gauze over the 4x4 gauze, and an ace bandage. I don’t want her to expose the incisions by herself. Would prefer her come into clinic. Thank you

## 2024-03-19 NOTE — TELEPHONE ENCOUNTER
Pt called to speak to DR due to recent surgery she had done with Dr. Pro pt took a shower and states bandage got wet and would like to know if she dry dry it or what she will need to do.

## 2024-03-19 NOTE — TELEPHONE ENCOUNTER
S/w patient- She states that she would prefer to come in to have Dr Pro change the dressing on the foot. I booked her for 1pm today.    IOANA

## 2024-03-22 NOTE — PROGRESS NOTES
[facility]  Progress Note    Minda Stallworth is a 54 year old female.   Chief Complaint   Patient presents with    Post-Op     Left foot - 1st visit - had sx on 3/14/24 - her splint got wet this morning and she is here for a dressing change - has pain rated as 6/10 in between medications - has some tingling in her toes - she is with a knee scooter          HPI:     Patient is status post 5 days from Kidner procedure, repair of peroneus brevis tendon tear, and plantar fasciotomy, all the left lower extremity (DOS: 3/14/2024).  Patient states that she got her dressings wet this morning when taking a shower.  She did have a shower cover, but water got through.  Patient states that the pain was severe the first few days, but has improved.  Currently rates it 6/10 and describes it as throbbing.  She does have tingling in her toes.  She has been nonweightbearing utilizing a knee scooter.  No other concerns at this time and is here today for further evaluation and care.  Denies recent nausea, vomiting, fever, chills, shortness of breath, chest pain, calf pain.      Allergies: Patient has no known allergies.   Current Outpatient Medications   Medication Sig Dispense Refill    HYDROcodone-acetaminophen 5-325 MG Oral Tab Take 1 tablet by mouth every 4 (four) hours as needed for Pain. 40 tablet 0    ondansetron (ZOFRAN) 4 mg tablet Take 1 tablet (4 mg total) by mouth every 8 (eight) hours as needed for Nausea. 20 tablet 2    PARoxetine 10 MG Oral Tab Take 1 tablet (10 mg total) by mouth every morning. 30 tablet 1    ergocalciferol 1.25 MG (67818 UT) Oral Cap Take 1 capsule (50,000 Units total) by mouth once a week. 12 capsule 0    Fezolinetant (VEOZAH) 45 MG Oral Tab Take 45 mg by mouth daily. 30 tablet 5    topiramate 100 MG Oral Tab Take 1 tablet (100 mg total) by mouth 2 (two) times daily. 180 tablet 0    SUMAtriptan Succinate 100 MG Oral Tab Take 1 tablet (100 mg total) by mouth every 2 (two) hours as needed  for Migraine. 9 tablet 5    valACYclovir 500 MG Oral Tab Take 1 tablet (500 mg total) by mouth daily. 90 tablet 1    HYDROcodone-acetaminophen 5-325 MG Oral Tab Take 1 tablet by mouth every 6 (six) hours as needed for Pain. 15 tablet 0    triamcinolone 0.1 % External Cream Apply topically 2 (two) times daily as needed. 60 g 1      Past Medical History:   Diagnosis Date    Abnormal uterine bleeding     Amenorrhea     Arthritis 2020    Decorative tattoo     Depression     Headache(784.0)     migraine    Human papilloma virus infection     Hx of motion sickness     Migraines     Plantar fasciitis, bilateral       Past Surgical History:   Procedure Laterality Date    APPENDECTOMY  2023    COLONOSCOPY      COLPOSCOPY, CERVIX W/UPPER ADJACENT VAGINA; W/BIOPSY(S), CERVIX      DILATION/CURETTAGE,DIAGNOSTIC        1991      Family History   Problem Relation Age of Onset    Heart Attack Father         Heart Attack    Heart Disorder Father     Breast Cancer Maternal Grandmother 72    Cancer Maternal Grandmother     Neurological Disorder Mother         Alzheimers    Dementia Mother       Social History     Socioeconomic History    Marital status:    Tobacco Use    Smoking status: Former     Packs/day: 1.00     Years: 22.00     Additional pack years: 0.00     Total pack years: 22.00     Types: Cigarettes     Quit date: 5/15/2013     Years since quitting: 10.8    Smokeless tobacco: Never   Vaping Use    Vaping Use: Never used   Substance and Sexual Activity    Alcohol use: Yes     Comment: Occasion    Drug use: No    Sexual activity: Yes     Partners: Male     Birth control/protection: Tubal Ligation   Other Topics Concern    Caffeine Concern Yes    Exercise No    Seat Belt Yes    Special Diet No    Stress Concern No    Weight Concern No           REVIEW OF SYSTEMS:     10 point ROS completed and was negative, except for pertinent positive and negatives stated in subjective.       EXAM:      General: NAD, appropriate and cooperative.  Vascular: Distal pulses intact b/l.  Edema to the left foot and ankle consistent with postoperative state.  Integument: Incisions well approximated with sutures/staples intact. No dehiscence or drainage, no SOI.  Neurological: Sensation and motor function intact and symmetric compatible with pre-operative state.  Musculoskeletal: s/p Kidner procedure, peroneal tendon tear repair, and plantar fasciotomy of the left lower extremity.  Pain is noted with palpation to all incision sites consistent with postoperative state.  Patient is able to actively move all of her digits of the left lower extremity.  All compartments of the foot are soft and compressible.  No acute deformities noted.         ASSESSMENT AND PLAN:   Diagnoses and all orders for this visit:    Post-operative state    Edema of left ankle        Plan:   -Patient was seen and evaluated today in clinic.  Chart history reviewed.    Patient is status post procedure, repair of peroneus brevis tendon tear, plantar fasciotomy, all left lower extremity (DOS: 3/14/2024)    Patient is doing well overall.  She did get her dressings wet this morning and came in for dressing change.  Sutures/staples intact with no current signs of infection or dehiscence    Surgical site painted with Betadine and covered with a dry sterile dressing.  Patient's posterior splint was overall dry.  Reapplied splint with new Ace bandages over her sterile dressing.    We will have patient keep dressings clean, dry, and intact until next visit    Patient will remain nonweightbearing to surgical extremity in posterior splint at this time.    Patient will continue with medications as prescribed    Recommend continue resting, icing, elevating surgical extremity    Educated patient on signs of infection and encouraged them to contact my office and seek immediate medical attention if noticing any of the signs.        -The patient indicates  understanding of these issues and agrees to the plan.    Time spent reviewing pertinent information from patient's chart, reviewing any pertinent imaging, obtaining history and physical exam, discussing and mutually agreeing on a treatment plan, and documenting encounter: 20 minutes    RTC 1 week for suture/staple removal      Isreal Pro DPM        3/21/2024    Dragon speech recognition software was used to prepare this note.  Errors in word recognition may occur.  Please contact me with any questions/concerns with this note.

## 2024-03-26 ENCOUNTER — OFFICE VISIT (OUTPATIENT)
Facility: LOCATION | Age: 54
End: 2024-03-26

## 2024-03-26 DIAGNOSIS — Z98.890 POST-OPERATIVE STATE: Primary | ICD-10-CM

## 2024-03-26 DIAGNOSIS — M25.472 EDEMA OF LEFT ANKLE: ICD-10-CM

## 2024-03-26 PROCEDURE — 99024 POSTOP FOLLOW-UP VISIT: CPT | Performed by: PODIATRIST

## 2024-03-26 NOTE — PROGRESS NOTES
[facility]  Progress Note    Minda Stallworth is a 54 year old female.   Chief Complaint   Patient presents with    Post-Op     L foot sx on 3/14/2024- rates pain 2-7/10 on and off-  stated taking Rx pain meds as needed- pt is NWB using knee scooter         HPI:     Patient is a pleasant 54-year-old female who is status post 2 weeks from Kidner procedure, repair of peroneus brevis tendon tear, and plantar fasciotomy, all of the left lower extremity (DOS: 3/14/2024).  Patient states that she is continuing to do well and has noticed improvements with her pain.  She rates the pain intermittently 2-7/10.  She has been taking her prescription pain medications as prescribed.  She has remained nonweightbearing in her posterior splint and has kept it clean, dry, intact.  She is utilizing a knee scooter, which she has today.  Denying any other concerns at this time and is here today to discuss suture/staple removal and further treatment plans.  Denies recent nausea, vomiting, fever, chills, shortness of breath, chest pain, calf pain.      Allergies: Patient has no known allergies.   Current Outpatient Medications   Medication Sig Dispense Refill    ondansetron (ZOFRAN) 4 mg tablet Take 1 tablet (4 mg total) by mouth every 8 (eight) hours as needed for Nausea. 20 tablet 2    PARoxetine 10 MG Oral Tab Take 1 tablet (10 mg total) by mouth every morning. 30 tablet 1    ergocalciferol 1.25 MG (03378 UT) Oral Cap Take 1 capsule (50,000 Units total) by mouth once a week. 12 capsule 0    Fezolinetant (VEOZAH) 45 MG Oral Tab Take 45 mg by mouth daily. 30 tablet 5    topiramate 100 MG Oral Tab Take 1 tablet (100 mg total) by mouth 2 (two) times daily. 180 tablet 0    SUMAtriptan Succinate 100 MG Oral Tab Take 1 tablet (100 mg total) by mouth every 2 (two) hours as needed for Migraine. 9 tablet 5    valACYclovir 500 MG Oral Tab Take 1 tablet (500 mg total) by mouth daily. 90 tablet 1    HYDROcodone-acetaminophen 5-325 MG  Oral Tab Take 1 tablet by mouth every 6 (six) hours as needed for Pain. 15 tablet 0    triamcinolone 0.1 % External Cream Apply topically 2 (two) times daily as needed. 60 g 1      Past Medical History:   Diagnosis Date    Abnormal uterine bleeding     Amenorrhea     Arthritis 2020    Decorative tattoo     Depression     Headache(784.0)     migraine    Human papilloma virus infection     Hx of motion sickness     Migraines     Plantar fasciitis, bilateral       Past Surgical History:   Procedure Laterality Date    APPENDECTOMY  2023    COLONOSCOPY      COLPOSCOPY, CERVIX W/UPPER ADJACENT VAGINA; W/BIOPSY(S), CERVIX      DILATION/CURETTAGE,DIAGNOSTIC        1991      Family History   Problem Relation Age of Onset    Heart Attack Father         Heart Attack    Heart Disorder Father     Breast Cancer Maternal Grandmother 72    Cancer Maternal Grandmother     Neurological Disorder Mother         Alzheimers    Dementia Mother       Social History     Socioeconomic History    Marital status:    Tobacco Use    Smoking status: Former     Packs/day: 1.00     Years: 22.00     Additional pack years: 0.00     Total pack years: 22.00     Types: Cigarettes     Quit date: 5/15/2013     Years since quitting: 10.8    Smokeless tobacco: Never   Vaping Use    Vaping Use: Never used   Substance and Sexual Activity    Alcohol use: Yes     Comment: Occasion    Drug use: No    Sexual activity: Yes     Partners: Male     Birth control/protection: Tubal Ligation   Other Topics Concern    Caffeine Concern Yes    Exercise No    Seat Belt Yes    Special Diet No    Stress Concern No    Weight Concern No           REVIEW OF SYSTEMS:     10 point ROS completed and was negative, except for pertinent positive and negatives stated in subjective.       EXAM:     General: NAD, appropriate and cooperative.  Vascular: Distal pulses intact b/l.  Edema to the left foot and ankle consistent with postoperative  state.  Integument: Incisions well approximated with sutures/staples intact prior to removal today. No dehiscence or drainage, no SOI.  Neurological: Sensation and motor function intact and symmetric compatible with pre-operative state.  Musculoskeletal: s/p Kidner procedure, peroneal tendon tear repair, and plantar fasciotomy of the left lower extremity.  Pain is noted with palpation to all incision sites consistent with postoperative state.  Patient is able to actively move all of her digits and ankle of the left lower extremity.  All compartments of the foot are soft and compressible.  No acute deformities noted.  It appears that all tendons are firing adequately that crossed the ankle joint of the left lower extremity       ASSESSMENT AND PLAN:   Diagnoses and all orders for this visit:    Post-operative state    Edema of left ankle        Plan:   -Patient was seen and evaluated today in clinic.  Chart history reviewed.    Patient is status post procedure, repair of peroneus brevis tendon tear, plantar fasciotomy, all left lower extremity (DOS: 3/14/2024)     Patient is doing well overall.  Pain continues to improve.  Splint was removed today and revealed improvements in edema.  Incision sites well-approximated with sutures/staples intact and no current signs of dehiscence or infection.    All sutures/staples removed today without incident.  Surgical site painted with Betadine and Steri-Strips applied to all incision sites.  Left foot covered with a dry sterile dressing.  Left lower extremity was then covered with a stockinette and Webril.  Patient elects for short leg cast over her posterior splint today.    PROCEDURE: - (02978 application of short leg cast) ( Cast supplies short leg splint age 11+)   With the ankle in neutral position, A well padded short leg fiberglass, nonweightbearing cast was applied to the left leg.  Tolerated well and no reported pain or pressure point.  Pt instructed to use  crutches, wheelchair, knee scooter, peglike, or walker for assistance.     We will have patient keep dressings clean, dry, and intact until next visit     Patient will remain nonweightbearing to surgical extremity in short leg cast at this time.     Patient will continue with medications as prescribed.  I did recommend the patient that she should take 1 baby aspirin daily for DVT prophylaxis while she is nonweightbearing in her cast     Recommend continue resting, icing, elevating surgical extremity     Educated patient on signs of infection and encouraged them to contact my office and seek immediate medical attention if noticing any of the signs.      Patient will remain out of work at this time.  She will be nonweightbearing for roughly 1 more month and states that she cannot work with nonweightbearing status.  My goal is for patient to start physical therapy at 6 weeks postop, which would be in roughly 1 month.    -The patient indicates understanding of these issues and agrees to the plan.    Time spent reviewing pertinent information from patient's chart, reviewing any pertinent imaging, obtaining history and physical exam, discussing and mutually agreeing on a treatment plan, and documenting encounter: 30 minutes    RTC 2 weeks      Isreal Pro DPM        3/26/2024    Dragon speech recognition software was used to prepare this note.  Errors in word recognition may occur.  Please contact me with any questions/concerns with this note.

## 2024-03-26 NOTE — TELEPHONE ENCOUNTER
Pt called today stating she had an office visit with Dr. Pro today. Dr. Pro updated her letter stating she needs to be off work for another month. Notified Rep who completed form for revision to be done.

## 2024-03-26 NOTE — TELEPHONE ENCOUNTER
Forms revised and faxed to [employer] [phone]. Fax confirm rec'd and scanned in chart. [employer] message sent.

## 2024-04-02 ENCOUNTER — TELEPHONE (OUTPATIENT)
Facility: LOCATION | Age: 54
End: 2024-04-02

## 2024-04-02 ENCOUNTER — OFFICE VISIT (OUTPATIENT)
Facility: LOCATION | Age: 54
End: 2024-04-02
Payer: COMMERCIAL

## 2024-04-02 DIAGNOSIS — M25.472 EDEMA OF LEFT ANKLE: ICD-10-CM

## 2024-04-02 DIAGNOSIS — Z98.890 POST-OPERATIVE STATE: Primary | ICD-10-CM

## 2024-04-02 DIAGNOSIS — M79.672 LEFT FOOT PAIN: ICD-10-CM

## 2024-04-02 PROCEDURE — 29515 APPLICATION SHORT LEG SPLINT: CPT | Performed by: PODIATRIST

## 2024-04-02 PROCEDURE — 99024 POSTOP FOLLOW-UP VISIT: CPT | Performed by: PODIATRIST

## 2024-04-02 RX ORDER — HYDROCODONE BITARTRATE AND ACETAMINOPHEN 5; 325 MG/1; MG/1
1 TABLET ORAL EVERY 6 HOURS PRN
Qty: 20 TABLET | Refills: 0 | Status: SHIPPED | OUTPATIENT
Start: 2024-04-02 | End: 2024-04-07

## 2024-04-02 NOTE — TELEPHONE ENCOUNTER
S/w patient- she states that Dr Pro put on a hard cast (at patient's request) at last POV on 3/26/24. She states that originally was feeling better, but over the last few days, she has had more pain and endorses discomfort with the cast. She states that toes have full sensation and have good color. Patient believes that she might be more swollen which is why the cast is painful. She was hoping to be seen today or tomorrow to have Dr remove cast and assess the incision. I found 30 minute opening today at 4:15. I booked patient for 30 minute appointment if we have to remove and reapply any splint/cast/boot. I instructed to patient to go to ER if she had any worsening conditions prior to appointment. She states that she is also out of [place] as of today. I told her that Dr Pro would assess her today and determine what would be the best pain medication for her at this point or if he will refill. Patient verbalized understanding    IOANA

## 2024-04-02 NOTE — TELEPHONE ENCOUNTER
Per pt the hard cast that was put on is causing a lot of pain and asking if she can come in today. Please advise

## 2024-04-06 NOTE — PROGRESS NOTES
[facility]  Progress Note    Minda Stallworth is a 54 year old female.   Chief Complaint   Patient presents with    Post-Op     S/p Kidner procedure of left lower extremity,repair of peroneus brevis tendon tear, LLE, Plantar fasciotomy, LLE on 3/14/24- pt here reporting cast feels tight and pain is 9/10. Pt requesting refill of norco         HPI:     Patient is a pleasant 54-year-old female who is status post 3 weeks from Kidner procedure, repair of peroneus brevis tendon tear, and plantar fasciotomy, all of the left lower extremity (DOS: 3/14/2024).  Patient is here due to pain and tightness in her short leg cast.  She currently rates her pain 9/10 and states that she feels that there is pressure on her incision sites.  She is also requesting a refill on pain medications as she continues to have pain.  She has been continuing to ice behind her knee and elevate frequently.  She has remained nonweightbearing utilizing knee scooter.  No other concerns at this time.  She is presenting to clinic for further evaluation and care.  Denies recent nausea, vomiting, fever, chills, shortness of breath, chest pain, calf pain.      Allergies: Patient has no known allergies.   Current Outpatient Medications   Medication Sig Dispense Refill    HYDROcodone-acetaminophen 5-325 MG Oral Tab Take 1 tablet by mouth every 6 (six) hours as needed for Pain. 20 tablet 0    ondansetron (ZOFRAN) 4 mg tablet Take 1 tablet (4 mg total) by mouth every 8 (eight) hours as needed for Nausea. 20 tablet 2    PARoxetine 10 MG Oral Tab Take 1 tablet (10 mg total) by mouth every morning. 30 tablet 1    ergocalciferol 1.25 MG (43674 UT) Oral Cap Take 1 capsule (50,000 Units total) by mouth once a week. 12 capsule 0    Fezolinetant (VEOZAH) 45 MG Oral Tab Take 45 mg by mouth daily. 30 tablet 5    topiramate 100 MG Oral Tab Take 1 tablet (100 mg total) by mouth 2 (two) times daily. 180 tablet 0    SUMAtriptan Succinate 100 MG Oral Tab Take 1  tablet (100 mg total) by mouth every 2 (two) hours as needed for Migraine. 9 tablet 5    valACYclovir 500 MG Oral Tab Take 1 tablet (500 mg total) by mouth daily. 90 tablet 1    HYDROcodone-acetaminophen 5-325 MG Oral Tab Take 1 tablet by mouth every 6 (six) hours as needed for Pain. 15 tablet 0    triamcinolone 0.1 % External Cream Apply topically 2 (two) times daily as needed. 60 g 1      Past Medical History:   Diagnosis Date    Abnormal uterine bleeding     Amenorrhea     Arthritis 2020    Decorative tattoo     Depression     Headache(784.0)     migraine    Human papilloma virus infection     Hx of motion sickness     Migraines     Plantar fasciitis, bilateral       Past Surgical History:   Procedure Laterality Date    APPENDECTOMY  2023    COLONOSCOPY      COLPOSCOPY, CERVIX W/UPPER ADJACENT VAGINA; W/BIOPSY(S), CERVIX      DILATION/CURETTAGE,DIAGNOSTIC        1991      Family History   Problem Relation Age of Onset    Heart Attack Father         Heart Attack    Heart Disorder Father     Breast Cancer Maternal Grandmother 72    Cancer Maternal Grandmother     Neurological Disorder Mother         Alzheimers    Dementia Mother       Social History     Socioeconomic History    Marital status:    Tobacco Use    Smoking status: Former     Packs/day: 1.00     Years: 22.00     Additional pack years: 0.00     Total pack years: 22.00     Types: Cigarettes     Quit date: 5/15/2013     Years since quitting: 10.9    Smokeless tobacco: Never   Vaping Use    Vaping Use: Never used   Substance and Sexual Activity    Alcohol use: Yes     Comment: Occasion    Drug use: No    Sexual activity: Yes     Partners: Male     Birth control/protection: Tubal Ligation   Other Topics Concern    Caffeine Concern Yes    Exercise No    Seat Belt Yes    Special Diet No    Stress Concern No    Weight Concern No           REVIEW OF SYSTEMS:     10 point ROS completed and was negative, except for pertinent  positive and negatives stated in subjective.       EXAM:     General: NAD, appropriate and cooperative.  Vascular: Distal pulses intact b/l.  Continue edema to the left foot and ankle consistent with postoperative state.  Integument: Incisions well approximated with Steri-Strips remaining to incision sites. No dehiscence or drainage, no SOI.  Neurological: Sensation and motor function intact and symmetric compatible with pre-operative state.  Musculoskeletal: s/p Kidner procedure, peroneal tendon tear repair, and plantar fasciotomy of the left lower extremity.  Pain is noted with palpation to all incision sites consistent with postoperative state.  Patient is able to actively move all of her digits and ankle of the left lower extremity.  Able to actively move her ankle in all directions with minimal discomfort.  All compartments of the foot are soft and compressible.  No acute deformities noted.  It appears that all tendons are firing adequately that crosses the ankle joint of the left lower extremity       ASSESSMENT AND PLAN:   Diagnoses and all orders for this visit:    Post-operative state  -     HYDROcodone-acetaminophen 5-325 MG Oral Tab; Take 1 tablet by mouth every 6 (six) hours as needed for Pain.    Edema of left ankle  -     HYDROcodone-acetaminophen 5-325 MG Oral Tab; Take 1 tablet by mouth every 6 (six) hours as needed for Pain.    Left foot pain  -     HYDROcodone-acetaminophen 5-325 MG Oral Tab; Take 1 tablet by mouth every 6 (six) hours as needed for Pain.        Plan:   -Patient was seen and evaluated today in clinic.  Chart history reviewed.    Patient is status post procedure, repair of peroneus brevis tendon tear, plantar fasciotomy, all left lower extremity (DOS: 3/14/2024)     Patient is doing well overall.  Pain continues to improve.  Splint was removed today and revealed improvements in edema.  Incision sites well-approximated with sutures/staples intact and no current signs of dehiscence or  infection.     All sutures/staples removed today without incident.  Surgical site painted with Betadine and Steri-Strips applied to all incision sites.  Left foot covered with a dry sterile dressing.  Left lower extremity was then covered with a stockinette and Webril.  Patient elects for short leg cast over her posterior splint today.     PROCEDURE: - (95257 application of short leg splint) Left lower extremity  With the ankle in neutral position, A well padded short leg fiberglass, nonweightbearing splint was applied to the left leg.  Tolerated well and no reported pain or pressure point.  Pt instructed to use crutches, wheelchair, knee scooter, peglike, or walker for assistance.     We will have patient keep dressings clean, dry, and intact until next visit     Patient will remain nonweightbearing to surgical extremity in short leg splint at this time.     Patient will continue with medications as prescribed.  I did recommend the patient that she should take 1 baby aspirin daily for DVT prophylaxis while she is nonweightbearing in her cast     Recommend continue resting, icing, elevating surgical extremity     Educated patient on signs of infection and encouraged them to contact my office and seek immediate medical attention if noticing any of the signs.       Patient will remain out of work at this time.  She will be nonweightbearing for roughly 3 more weeks and states that she cannot work with nonweightbearing status.  My goal is for patient to start physical therapy at 6 weeks postop, which would be in roughly 3 more weeks.    -The patient indicates understanding of these issues and agrees to the plan.    Time spent reviewing pertinent information from patient's chart, reviewing any pertinent imaging, obtaining history and physical exam, discussing and mutually agreeing on a treatment plan, and documenting encounter: 20 minutes    RTC 2 weeks      Isreal Pro DPM        4/6/2024    Dragon speech recognition  software was used to prepare this note.  Errors in word recognition may occur.  Please contact me with any questions/concerns with this note.

## 2024-04-10 ENCOUNTER — OFFICE VISIT (OUTPATIENT)
Facility: LOCATION | Age: 54
End: 2024-04-10
Payer: COMMERCIAL

## 2024-04-10 DIAGNOSIS — M79.672 LEFT FOOT PAIN: ICD-10-CM

## 2024-04-10 DIAGNOSIS — M25.472 EDEMA OF LEFT ANKLE: ICD-10-CM

## 2024-04-10 DIAGNOSIS — Z98.890 POST-OPERATIVE STATE: Primary | ICD-10-CM

## 2024-04-10 PROCEDURE — L4387 NON-PNEUM WALK BOOT PRE OTS: HCPCS | Performed by: PODIATRIST

## 2024-04-10 PROCEDURE — 99024 POSTOP FOLLOW-UP VISIT: CPT | Performed by: PODIATRIST

## 2024-04-10 RX ORDER — HYDROCODONE BITARTRATE AND ACETAMINOPHEN 5; 325 MG/1; MG/1
1 TABLET ORAL EVERY 4 HOURS PRN
Qty: 40 TABLET | Refills: 0 | OUTPATIENT
Start: 2024-04-10

## 2024-04-10 NOTE — PROGRESS NOTES
[facility]  Progress Note    Minda Stallworth is a 54 year old female.   Chief Complaint   Patient presents with    Post-Op     L foot sx on 3/14/2024- rates pain 4/10 on and off- pt is NWB using knee scooter         HPI:     Patient is a pleasant 54-year-old female who is status post 4 weeks from Kidner procedure, repair of peroneus brevis tendon tear, and plantar fasciotomy, all of the left lower extremity (DOS: 3/14/2024).  Patient states that she is continuing to do well overall.  She has remained nonweightbearing in her posterior splint and is utilizing a knee scooter.  She has a knee scooter with her today.  She states that the pain has improved, rating it 4/10 and states that it is on and off.  She does feel like the splint is getting very tight.  She has been continuing to elevate and ice behind her knee.  Patient is leaving for vacation this Sunday and will be back a week from the Sunday.  No other concerns at this time.  Denies recent nausea, vomiting, fever, chills, shortness of breath, chest pain, calf pain.      Allergies: Patient has no known allergies.   Current Outpatient Medications   Medication Sig Dispense Refill    ondansetron (ZOFRAN) 4 mg tablet Take 1 tablet (4 mg total) by mouth every 8 (eight) hours as needed for Nausea. 20 tablet 2    PARoxetine 10 MG Oral Tab Take 1 tablet (10 mg total) by mouth every morning. 30 tablet 1    ergocalciferol 1.25 MG (85076 UT) Oral Cap Take 1 capsule (50,000 Units total) by mouth once a week. 12 capsule 0    Fezolinetant (VEOZAH) 45 MG Oral Tab Take 45 mg by mouth daily. 30 tablet 5    topiramate 100 MG Oral Tab Take 1 tablet (100 mg total) by mouth 2 (two) times daily. 180 tablet 0    SUMAtriptan Succinate 100 MG Oral Tab Take 1 tablet (100 mg total) by mouth every 2 (two) hours as needed for Migraine. 9 tablet 5    valACYclovir 500 MG Oral Tab Take 1 tablet (500 mg total) by mouth daily. 90 tablet 1    HYDROcodone-acetaminophen 5-325 MG Oral  Tab Take 1 tablet by mouth every 6 (six) hours as needed for Pain. 15 tablet 0    triamcinolone 0.1 % External Cream Apply topically 2 (two) times daily as needed. 60 g 1      Past Medical History:    Abnormal uterine bleeding    Amenorrhea    Arthritis    Decorative tattoo    Depression    Headache(784.0)    migraine    Human papilloma virus infection    Hx of motion sickness    Migraines    Plantar fasciitis, bilateral      Past Surgical History:   Procedure Laterality Date    Appendectomy  2023    Colonoscopy      Colposcopy, cervix w/upper adjacent vagina; w/biopsy(s), cervix      Dilation/curettage,diagnostic        1991      Family History   Problem Relation Age of Onset    Heart Attack Father         Heart Attack    Heart Disorder Father     Breast Cancer Maternal Grandmother 72    Cancer Maternal Grandmother     Neurological Disorder Mother         Alzheimers    Dementia Mother       Social History     Socioeconomic History    Marital status:    Tobacco Use    Smoking status: Former     Current packs/day: 0.00     Average packs/day: 1 pack/day for 22.0 years (22.0 ttl pk-yrs)     Types: Cigarettes     Start date: 5/15/1991     Quit date: 5/15/2013     Years since quitting: 10.9    Smokeless tobacco: Never   Vaping Use    Vaping status: Never Used   Substance and Sexual Activity    Alcohol use: Yes     Comment: Occasion    Drug use: No    Sexual activity: Yes     Partners: Male     Birth control/protection: Tubal Ligation   Other Topics Concern    Caffeine Concern Yes    Exercise No    Seat Belt Yes    Special Diet No    Stress Concern No    Weight Concern No           REVIEW OF SYSTEMS:     10 point ROS completed and was negative, except for pertinent positive and negatives stated in subjective.       EXAM:     General: NAD, appropriate and cooperative.  Vascular: Distal pulses intact b/l.  Continue edema to the left foot and ankle consistent with postoperative  state.  Integument: Incisions well approximated with Steri-Strips remaining to incision sites. No dehiscence or drainage, no SOI.  Neurological: Sensation and motor function intact and symmetric compatible with pre-operative state.  Musculoskeletal: s/p Kidner procedure, peroneal tendon tear repair, and plantar fasciotomy of the left lower extremity.  Improved pain is noted with palpation to all incision sites consistent with postoperative state.  No pain with palpation to plantar medial aspect of the heel.  Patient is able to actively move all of her digits and ankle of the left lower extremity with some limitations in the ankle joint due to stiffness.  Able to actively move her ankle in all directions with minimal discomfort.  All compartments of the foot are soft and compressible.  No acute deformities noted.  It appears that all tendons are firing adequately that crosses the ankle joint of the left lower extremity       ASSESSMENT AND PLAN:   Diagnoses and all orders for this visit:    Post-operative state    Left foot pain    Edema of left ankle        Plan:   -Patient was seen and evaluated today in clinic.  Chart history reviewed.    Patient is status post procedure, repair of peroneus brevis tendon tear, plantar fasciotomy, all left lower extremity (DOS: 3/14/2024)     Patient is doing well overall.  Pain continues to improve.  Splint was removed today and revealed improvements in edema.       Incision sites remain well-approximated with Steri-Strips intact.  No current drainage, surrounding erythema, or signs of infection.  No dehiscence noted to all incision sites     Provided patient with a tall cam boot today.  Patient will remain nonweightbearing to surgical extremity in tall cam boot at this time.    I did advise patient to take off her cam boot while at rest and begin range of motion exercises.  She should remain completely nonweightbearing in the cam boot otherwise.     Patient will continue with  medications as prescribed.  I did recommend the patient that she should take 1 baby aspirin daily for DVT prophylaxis while she is nonweightbearing      Recommend continue resting, icing, elevating surgical extremity    Placing an order for formal physical therapy today.  Will have patient plan to begin physical therapy when she returns from vacation a week from this Sunday.     Educated patient on signs of infection and encouraged them to contact my office and seek immediate medical attention if noticing any of the signs.       Patient will remain out of work at this time.  She will be nonweightbearing for roughly 2 more weeks and states that she cannot work with nonweightbearing status.  My goal is for patient to start physical therapy at 6 weeks postop, which would be in roughly 2 more weeks.    -The patient indicates understanding of these issues and agrees to the plan.    Time spent reviewing pertinent information from patient's chart, reviewing any pertinent imaging, obtaining history and physical exam, discussing and mutually agreeing on a treatment plan, and documenting encounter: 20 minutes    RTC 2 weeks      Isreal Pro DPM        4/10/2024    Dragon speech recognition software was used to prepare this note.  Errors in word recognition may occur.  Please contact me with any questions/concerns with this note.

## 2024-04-23 ENCOUNTER — OFFICE VISIT (OUTPATIENT)
Facility: LOCATION | Age: 54
End: 2024-04-23

## 2024-04-23 DIAGNOSIS — M79.672 LEFT FOOT PAIN: ICD-10-CM

## 2024-04-23 DIAGNOSIS — Z98.890 POST-OPERATIVE STATE: Primary | ICD-10-CM

## 2024-04-23 PROCEDURE — 99024 POSTOP FOLLOW-UP VISIT: CPT | Performed by: PODIATRIST

## 2024-04-23 NOTE — PROGRESS NOTES
[facility]  Progress Note    Minda Stallworth is a 54 year old female.   Chief Complaint   Patient presents with    Post-Op     POV Left foot sx on 3/14/2024.  Had Pain 10/10 took Norco to manage pain last nigh. Today has tingling. Just came back from her cruise , and will start  PT.         HPI:     Patient is a pleasant 54-year-old female who is status post 6 weeks of Kidner procedure, repair of peroneus brevis tendon tear, plantar fasciotomy, all the left lower extremity (DOS: 3/14/2024).  Patient states that she is doing well overall.  She states that she did have some significant pain last night and did take a Norco.  She states the pain was 10/10.  She currently is not experiencing any pain.  She was recently on a cruise and just got back.  She states that she has been noticing intermittent swelling.  She states that she has remained nonweightbearing to the surgical extremity at all times.  She has a knee scooter and a cam boot with her today.  She states that she will be starting physical therapy next week.  Denies any falls/injuries.  No other concerns at this time and is here for further evaluation and care.  Denies recent nausea, vomiting, fever, chills, shortness of breath, chest pain, calf pain.      Allergies: Patient has no known allergies.   Current Outpatient Medications   Medication Sig Dispense Refill    ondansetron (ZOFRAN) 4 mg tablet Take 1 tablet (4 mg total) by mouth every 8 (eight) hours as needed for Nausea. 20 tablet 2    PARoxetine 10 MG Oral Tab Take 1 tablet (10 mg total) by mouth every morning. 30 tablet 1    ergocalciferol 1.25 MG (90011 UT) Oral Cap Take 1 capsule (50,000 Units total) by mouth once a week. 12 capsule 0    Fezolinetant (VEOZAH) 45 MG Oral Tab Take 45 mg by mouth daily. 30 tablet 5    topiramate 100 MG Oral Tab Take 1 tablet (100 mg total) by mouth 2 (two) times daily. 180 tablet 0    SUMAtriptan Succinate 100 MG Oral Tab Take 1 tablet (100 mg total) by  mouth every 2 (two) hours as needed for Migraine. 9 tablet 5    valACYclovir 500 MG Oral Tab Take 1 tablet (500 mg total) by mouth daily. 90 tablet 1    HYDROcodone-acetaminophen 5-325 MG Oral Tab Take 1 tablet by mouth every 6 (six) hours as needed for Pain. 15 tablet 0    triamcinolone 0.1 % External Cream Apply topically 2 (two) times daily as needed. 60 g 1      Past Medical History:    Abnormal uterine bleeding    Amenorrhea    Arthritis    Decorative tattoo    Depression    Headache(784.0)    migraine    Human papilloma virus infection    Hx of motion sickness    Migraines    Plantar fasciitis, bilateral      Past Surgical History:   Procedure Laterality Date    Appendectomy  2023    Colonoscopy      Colposcopy, cervix w/upper adjacent vagina; w/biopsy(s), cervix      Dilation/curettage,diagnostic        1991      Family History   Problem Relation Age of Onset    Heart Attack Father         Heart Attack    Heart Disorder Father     Breast Cancer Maternal Grandmother 72    Cancer Maternal Grandmother     Neurological Disorder Mother         Alzheimers    Dementia Mother       Social History     Socioeconomic History    Marital status:    Tobacco Use    Smoking status: Former     Current packs/day: 0.00     Average packs/day: 1 pack/day for 22.0 years (22.0 ttl pk-yrs)     Types: Cigarettes     Start date: 5/15/1991     Quit date: 5/15/2013     Years since quitting: 10.9    Smokeless tobacco: Never   Vaping Use    Vaping status: Never Used   Substance and Sexual Activity    Alcohol use: Yes     Comment: Occasion    Drug use: No    Sexual activity: Yes     Partners: Male     Birth control/protection: Tubal Ligation   Other Topics Concern    Caffeine Concern Yes    Exercise No    Seat Belt Yes    Special Diet No    Stress Concern No    Weight Concern No           REVIEW OF SYSTEMS:     10 point ROS completed and was negative, except for pertinent positive and negatives stated in  subjective.       EXAM:     General: NAD, appropriate and cooperative.  Vascular: Distal pulses intact b/l.  Continue edema to the left foot and ankle consistent with postoperative state.  Integument: Incisions well healed. No dehiscence or drainage, no SOI.  Neurological: Sensation and motor function intact and symmetric compatible with pre-operative state.  Musculoskeletal: s/p Kidner procedure, peroneal tendon tear repair, and plantar fasciotomy of the left lower extremity.  Improved pain is noted with palpation to all incision sites consistent with postoperative state.  No pain with palpation to plantar medial aspect of the heel.  Patient is able to actively move all of her digits and ankle of the left lower extremity with some limitations in the ankle joint due to stiffness.  Able to actively move her ankle in all directions with minimal discomfort.  All compartments of the foot are soft and compressible.  No acute deformities noted.  4/5 muscle strength to all tendons crossing the ankle joint with no pain elicited.  It appears that all tendons are firing adequately that crosses the ankle joint of the left lower extremity       ASSESSMENT AND PLAN:   Diagnoses and all orders for this visit:    Post-operative state    Left foot pain        Plan:   -Patient was seen and evaluated today in clinic.  Chart history reviewed.    Patient is status post procedure, repair of peroneus brevis tendon tear, plantar fasciotomy, all left lower extremity (DOS: 3/14/2024)     Patient is doing well overall.  Pain continues to improve other than last night.  I did explain the patient that this is most likely due to her recent travel.  Splint was removed today and revealed improvements in edema.       Incision sites well-healed.  No current drainage, surrounding erythema, or signs of infection.  No dehiscence noted to all incision sites      Patient will be again weightbearing in cam boot at this time.  Explained to patient that  she should slowly progress to weightbearing.  She should start with standing in her cam boot and then begin weightbearing at home.  Over the next 2 weeks, patient should work to weightbearing as tolerated in cam boot at all times.  After 2 weeks, with physical therapist's help, patient will transition out of cam boot into supportive shoe gear with lace up ankle brace.      An order for a lace up ankle brace was provided to patient today.    Recommend continue icing and elevating surgical extremity     Patient will begin physical therapy next week.     Educated patient on signs of infection and encouraged them to contact my office and seek immediate medical attention if noticing any of the signs.       Patient will remain out of work at this time.  Patient will be transitioning to weightbearing as tolerated but will need to be in a cam boot for at least 2 more weeks.  She will then transition to ankle brace for another several weeks while rehabbing her surgical extremity.    -The patient indicates understanding of these issues and agrees to the plan.    Time spent reviewing pertinent information from patient's chart, reviewing any pertinent imaging, obtaining history and physical exam, discussing and mutually agreeing on a treatment plan, and documenting encounter: 20 minutes    RTC 3 weeks      Isreal Pro DPM        4/23/2024    Dragon speech recognition software was used to prepare this note.  Errors in word recognition may occur.  Please contact me with any questions/concerns with this note.

## 2024-04-24 ENCOUNTER — TELEPHONE (OUTPATIENT)
Dept: PHYSICAL THERAPY | Facility: HOSPITAL | Age: 54
End: 2024-04-24

## 2024-04-30 ENCOUNTER — OFFICE VISIT (OUTPATIENT)
Facility: LOCATION | Age: 54
End: 2024-04-30
Attending: PODIATRIST
Payer: COMMERCIAL

## 2024-04-30 DIAGNOSIS — Z98.890 POST-OPERATIVE STATE: Primary | ICD-10-CM

## 2024-04-30 DIAGNOSIS — M79.672 LEFT FOOT PAIN: ICD-10-CM

## 2024-04-30 DIAGNOSIS — M25.472 EDEMA OF LEFT ANKLE: ICD-10-CM

## 2024-04-30 PROCEDURE — 97161 PT EVAL LOW COMPLEX 20 MIN: CPT

## 2024-04-30 PROCEDURE — 97110 THERAPEUTIC EXERCISES: CPT

## 2024-04-30 NOTE — PROGRESS NOTES
LOWER EXTREMITY EVALUATION:     Diagnosis:   Post-operative state (Z98.890)  Left foot pain (M79.672)  Edema of left ankle (M25.472)      Referring Provider: Morteza  Date of Evaluation:    4/30/2024    Precautions:    WBAT in CAM boot for 2 more weeks than transition to ankle brace  Next MD visit:   none scheduled  Date of Surgery:   3/24/24 - Kidner procedure, repair of peroneus brevis tendon, plantar fasciotomy of the (L) LE     PATIENT SUMMARY   Minda Stallworth is a 54 year old female who presents to therapy today with complaints of post operative (L) foot/ankle pain. She underwent Kidner procedure, repair of peroneus brevis tendon tear, plantar fasciotomy, all of the left lower extremity on 3/14/24. She has remained non weightbearing since with a CAM boot and scooter. Her last follow up with physician was on 4/23/24. She is to be weightbearing as tolerated with CAM boot for another 2 weeks and then transition to ankle brace for another several weeks while rehabbing. No pain, but continues to have tingling outside and inside forefoot and lateral ankle. She does have pain (L) plantar fascia and heel as well. She is a  and currently off of work.       Pt describes pain level current 0/10, at best 0/10, at worst 1-2/10. (Tingling is bothersome)  Current functional limitations include standing, walking, steps, squatting down, getting into and out of car, ADLs, household activities.     Minda describes prior level of function with pain and restriction. Pt goals include wanting to be able to walk, go up and down steps, household activities, get back to work   Past medical history was reviewed with Minda. Significant findings include  has a past medical history of Abnormal uterine bleeding, Amenorrhea, Arthritis (03/12/2020), Decorative tattoo, Depression, Headache(784.0), Human papilloma virus infection, motion sickness, Migraines, and Plantar fasciitis, bilateral.      ASSESSMENT  Minda presents  to physical therapy evaluation with primary c/o post operative (L) ankle/foot pain and mobility restrictions. The results of the objective tests and measures show expected decrease in ankle/foot ROM, decreased flexibility, decreased strength, impaired gait, impaired balance, mobility restricitons.  Functional deficits include but are not limited to standing, walking, steps, squatting down, ADLs, household activities, work duties/responsibilities.  Signs and symptoms are consistent with post operative diagnosis. Pt and PT discussed evaluation findings, pathology, POC and HEP.  Pt voiced understanding and performs HEP correctly without reported pain. Skilled Physical Therapy is medically necessary to address the above impairments and reach functional goals.     OBJECTIVE:   Observation: incisions nicely healed, moderate swelling    Gait Summary: pt ambulates with scooter & NWB to the (L) LE    A/PROM (Degrees):  Foot/Ankle/Knee (R) (L)   DF 12* -5*   PF 50 45   Inversion 45 25   Eversion 12 5     Strenth/MMT (Scale 0-5/5):  Foot/Ankle/Knee/Hip (R) (L)   DF 5 defer   PF 4 defer   Inversion 5 defer   Eversion 5 defer     -(B) hip & knee grossly 4/5    Palpation:   Tightness noted throughout (L) calf musculature   (L) foot slightly cold to touch     Sensation:   Intact - however a bit more sensitive to touch    Flexibility:   Mod to max (L) [id number] tightness    Accessory Motion: defer (expected TC, distal tibfib, subtalar joint restriction)    Balance: defer    Today’s Treatment and Response:   Pt education was provided on exam findings, treatment diagnosis, treatment plan, expectations, and prognosis. Pt was also provided recommendations for activity modifications, possible soreness after evaluation, modalities as needed [ice/heat], postural corrections, pain science education , detrimental fear avoidance behaviors, importance of remaining active, strategies to reduce fall risk at home, and shoe wear. Pt  education regarding her procedure, appropriate course of rehab progression and healing time, functional anatomy and biomechanics/pathomechanics of the ankle/foot complex, adherence to HEP for optimal outcomes, use of modalities to decrease swelling     Attempted working on weight shifting along with ambulation along the length of table. Very apprehensive but became a bit more confident by end of session. Will try to attempt at home      Patient was instructed in and issued a HEP for:   Access Code: [email or web address]  URL: [email or web address]  Date: 04/30/2024  Prepared by: Nigel Vicente    Exercises  - Seated Ankle Circles  - 2 x daily - 7 x weekly - 3 sets - 10 reps  - Supine Ankle Pumps  - 2 x daily - 7 x weekly - 3 sets - 10 reps  - Long Sitting Calf Stretch with Strap  - 2 x daily - 7 x weekly - 3 sets - 30 seconds hold  - Seated Heel Toe Raises  - 2 x daily - 7 x weekly - 2 sets - 10 reps  - Towel Scrunches  - 2 x daily - 7 x weekly - 3 sets - 10 reps      Charges: PT Eval Low Complexity, TherEx 1 unit      Total Timed Treatment: 10 min     Total Treatment Time: 45 min     Based on clinical rationale and outcome measures, this evaluation involved Low Complexity decision making due to 1-2 personal factors/comorbidities, 3 body structures involved/activity limitations, and evolving symptoms including changing pain levels.    PLAN OF CARE:    Goals: (to be met in 16 visits)  Pt will demonstrate improved DF AROM to >= 10 degrees to promote proper foot clearance during gait and greater ease descending stairs without compensation  Pt will have increased ankle strength to at least 4/5 throughout for improved ankle control with prolonged gait stair negotiation   Pt will have improved SLS to >10s for increased ankle stability with ambulation on uneven surfaces such as gravel and grass   Pt will demonstrate improved heel to toe ambulatory pattern   Pt will be able to perform ADLs, household activities with  no reported pain  Pt will be able to walk 2-3 blocks without pain and less reported restriction   Pt will be able to ascend/descend step with adequate control, minimal discomfort, and less restriction   Pt will be independent and compliant with comprehensive HEP to maintain progress achieved in PT     Frequency / Duration: Patient will be seen for 2 x/week or a total of 16 visits over a 90 day period. Treatment will include: Gait training, Manual Therapy, Neuromuscular Re-education, Self-Care Home Management, Therapeutic Activities, Therapeutic Exercise, Home Exercise Program instruction, and Modalities if necessary    Education or treatment limitation: None    Rehab Potential:good    LEFS Score  LEFS Score: 15 % (4/24/2024  7:30 AM)      Patient/Family/Caregiver was advised of these findings, precautions, and treatment options and has agreed to actively participate in planning and for this course of care.    Thank you for your referral. Please co-sign or sign and return this letter via fax as soon as possible to [phone]. If you have any questions, please contact me at Dept: [phone]    Sincerely,  Electronically signed by therapist: Nigel Vicente, PT  Physician's certification required: Yes  I certify the need for these services furnished under this plan of treatment and while under my care.    X___________________________________________________ Date____________________    Certification From: 4/30/2024  To:7/29/2024

## 2024-05-03 ENCOUNTER — OFFICE VISIT (OUTPATIENT)
Facility: LOCATION | Age: 54
End: 2024-05-03
Attending: PODIATRIST
Payer: COMMERCIAL

## 2024-05-03 PROCEDURE — 97110 THERAPEUTIC EXERCISES: CPT

## 2024-05-03 NOTE — PROGRESS NOTES
Diagnosis:   Post-operative state (Z98.890)  Left foot pain (M79.672)  Edema of left ankle (M25.472)        Referring Provider: Jose Pro  Date of Evaluation:    4/30324    Precautions:  WBAT in CAM boot for 2 more weeks than transition to ankle brace  Next MD visit:   Date of Surgery:   3/14/24 - Kidner procedure, repair of peroneus brevis tendon, plantar fasciotomy of the (L) LE   Insurance Primary/Secondary: [employer] OUT OF STATE / N/A     # Auth Visits: 16            Subjective: able to walk better with boot on; no longer any tingling inside foot and not cold anymore; however does have tingling on the outside of ankle and foot     Pain: 6/10 - heel pain      Objective: see flowsheet      Assessment:   (L) ankle moving well in all directions. Tolerating PROM and gentle stretching very well.   Exercises helping to improve ROM and mobility.       Goals:   Pt will demonstrate improved DF AROM to >= 10 degrees to promote proper foot clearance during gait and greater ease descending stairs without compensation  Pt will have increased ankle strength to at least 4/5 throughout for improved ankle control with prolonged gait stair negotiation   Pt will have improved SLS to >10s for increased ankle stability with ambulation on uneven surfaces such as gravel and grass   Pt will demonstrate improved heel to toe ambulatory pattern   Pt will be able to perform ADLs, household activities with no reported pain  Pt will be able to walk 2-3 blocks without pain and less reported restriction   Pt will be able to ascend/descend step with adequate control, minimal discomfort, and less restriction   Pt will be independent and compliant with comprehensive HEP to maintain progress achieved in PT     Plan: improve ankle ROM/mobility via soft tissue treatment, exercise, stretching  Date: 5/3/2024  TX#: 2/16 Date:                 TX#: 3/ Date:                 TX#: 4/ Date:                 TX#: 5/ Date:   Tx#: 6/   Manual Therapy NT               TherEx (40 min)       Seated ankle circles 30 CW/CCW       Seated gastroc stretch 30 sec x 3       Seated towel scrunches x 1 min       Seated rocker board AP & ML x 3 min       Seated heel raise & toe raise 2 x 15       PROM w/gentle stretching in all planes        HEP:   Access Code: [email or web address]  URL: [email or web address]  Date: 04/30/2024  Prepared by: Nigel Vicente    Exercises  - Seated Ankle Circles  - 2 x daily - 7 x weekly - 3 sets - 10 reps  - Supine Ankle Pumps  - 2 x daily - 7 x weekly - 3 sets - 10 reps  - Long Sitting Calf Stretch with Strap  - 2 x daily - 7 x weekly - 3 sets - 30 seconds hold  - Seated Heel Toe Raises  - 2 x daily - 7 x weekly - 2 sets - 10 reps  - Towel Scrunches  - 2 x daily - 7 x weekly - 3 sets - 10 reps      Charges:; TherEx 3       Total Timed Treatment: 40 min  Total Treatment Time: 40 min

## 2024-05-04 ENCOUNTER — OFFICE VISIT (OUTPATIENT)
Dept: FAMILY MEDICINE CLINIC | Facility: CLINIC | Age: 54
End: 2024-05-04
Payer: COMMERCIAL

## 2024-05-04 VITALS
WEIGHT: 196 LBS | DIASTOLIC BLOOD PRESSURE: 75 MMHG | TEMPERATURE: 98 F | HEART RATE: 89 BPM | OXYGEN SATURATION: 98 % | SYSTOLIC BLOOD PRESSURE: 110 MMHG | BODY MASS INDEX: 31.13 KG/M2 | RESPIRATION RATE: 16 BRPM | HEIGHT: 66.5 IN

## 2024-05-04 DIAGNOSIS — J01.00 ACUTE MAXILLARY SINUSITIS, RECURRENCE NOT SPECIFIED: Primary | ICD-10-CM

## 2024-05-04 DIAGNOSIS — Z20.828 EXPOSURE TO INFLUENZA: ICD-10-CM

## 2024-05-04 DIAGNOSIS — R05.9 COUGH IN ADULT: ICD-10-CM

## 2024-05-04 DIAGNOSIS — Z20.89 EXPOSURE TO PNEUMONIA: ICD-10-CM

## 2024-05-04 PROCEDURE — 99213 OFFICE O/P EST LOW 20 MIN: CPT | Performed by: NURSE PRACTITIONER

## 2024-05-04 PROCEDURE — 3008F BODY MASS INDEX DOCD: CPT | Performed by: NURSE PRACTITIONER

## 2024-05-04 PROCEDURE — 3078F DIAST BP <80 MM HG: CPT | Performed by: NURSE PRACTITIONER

## 2024-05-04 PROCEDURE — 3074F SYST BP LT 130 MM HG: CPT | Performed by: NURSE PRACTITIONER

## 2024-05-04 RX ORDER — AMOXICILLIN AND CLAVULANATE POTASSIUM 875; 125 MG/1; MG/1
1 TABLET, FILM COATED ORAL 2 TIMES DAILY
Qty: 20 TABLET | Refills: 0 | Status: SHIPPED | OUTPATIENT
Start: 2024-05-04 | End: 2024-05-14

## 2024-05-04 NOTE — PATIENT INSTRUCTIONS
PLAN: Augmentin, take as directed. Finish all the medication even if you feel better.   Probiotics or yogurt daily during antibiotic use will help decrease stomach upset and restore good bacteria to the gut.  Warm or cold pack to face/head for comfort as needed.  Salt water gargles (1 tsp. Salt in 6 oz lukewarm water), use several times daily to help decrease post nasal drip and soothe throat.  Saline nasal spray to nostrils if needed to help remove drainage or congestion in nose.   Hydrate! (cold or hot based on comfort). Drink lots of water or other non dehydrating liquids to help with illness. Salty foods and soups can help with throat pain and swelling as well.   Hand washing-use hand  or wash hands frequently, cover your cough or sneeze, do not share towels or drinks with others.  May take Delsym over the counter every 12 hours if needed for cough if not contraindicated. Cepacol lozenges or other throat drops may help soothe as well during the day.  May use Tylenol or Ibuprofen over the counter for pain/comfort if not contraindicated.  Follow up in 2 weeks with Dr. Hernández if symptoms persist,  or sooner if worsening symptoms. Seek immediate care if inability to swallow or breathe.

## 2024-05-04 NOTE — PROGRESS NOTES
HPI:   Minda Stallworth is a 54 year old female who presents with ill symptoms for  2  days. Patient reports cough, post nasal drainage, fatigue, sore throat, headache triggering migraines, sinus pressure. Denies fever. Has tried migraine medication with not much relief.  is sick, was seen for illness and diagnosed with influenza and bacterial pneumonia upon xray, on antibiotics.     Current Outpatient Medications   Medication Sig Dispense Refill    ondansetron (ZOFRAN) 4 mg tablet Take 1 tablet (4 mg total) by mouth every 8 (eight) hours as needed for Nausea. 20 tablet 2    PARoxetine 10 MG Oral Tab Take 1 tablet (10 mg total) by mouth every morning. 30 tablet 1    ergocalciferol 1.25 MG (30862 UT) Oral Cap Take 1 capsule (50,000 Units total) by mouth once a week. 12 capsule 0    Fezolinetant (VEOZAH) 45 MG Oral Tab Take 45 mg by mouth daily. 30 tablet 5    topiramate 100 MG Oral Tab Take 1 tablet (100 mg total) by mouth 2 (two) times daily. 180 tablet 0    SUMAtriptan Succinate 100 MG Oral Tab Take 1 tablet (100 mg total) by mouth every 2 (two) hours as needed for Migraine. 9 tablet 5    valACYclovir 500 MG Oral Tab Take 1 tablet (500 mg total) by mouth daily. 90 tablet 1    HYDROcodone-acetaminophen 5-325 MG Oral Tab Take 1 tablet by mouth every 6 (six) hours as needed for Pain. 15 tablet 0    triamcinolone 0.1 % External Cream Apply topically 2 (two) times daily as needed. 60 g 1     Current Facility-Administered Medications   Medication Dose Route Frequency Provider Last Rate Last Admin    Dexamethasone (Decadron) 10 MG/ML injection 10 mg  10 mg Intramuscular Once Jose Pro DPM        triamcinolone acetonide (Kenalog-40) 40 MG/ML injection 40 mg  40 mg Intra-articular Once Jose Pro DPM          Past Medical History:    Abnormal uterine bleeding    Amenorrhea    Arthritis    Decorative tattoo    Depression    Headache(784.0)    migraine    Human papilloma virus infection    Hx of motion  sickness    Migraines    Plantar fasciitis, bilateral      Past Surgical History:   Procedure Laterality Date    Appendectomy  2023    Colonoscopy      Colposcopy, cervix w/upper adjacent vagina; w/biopsy(s), cervix      Dilation/curettage,diagnostic        1991      Family History   Problem Relation Age of Onset    Heart Attack Father         Heart Attack    Heart Disorder Father     Breast Cancer Maternal Grandmother 72    Cancer Maternal Grandmother     Neurological Disorder Mother         Alzheimers    Dementia Mother       Social History     Socioeconomic History    Marital status:    Tobacco Use    Smoking status: Former     Current packs/day: 0.00     Average packs/day: 1 pack/day for 22.0 years (22.0 ttl pk-yrs)     Types: Cigarettes     Start date: 5/15/1991     Quit date: 5/15/2013     Years since quitting: 10.9    Smokeless tobacco: Never   Vaping Use    Vaping status: Never Used   Substance and Sexual Activity    Alcohol use: Yes     Comment: Occasion    Drug use: No    Sexual activity: Yes     Partners: Male     Birth control/protection: Tubal Ligation   Other Topics Concern    Caffeine Concern Yes    Exercise No    Seat Belt Yes    Special Diet No    Stress Concern No    Weight Concern No         REVIEW OF SYSTEMS:   GENERAL: feels well otherwise, fatigued as above  HEENT: congested, as above in HPI  LUNGS: denies shortness of breath with exertion  CARDIOVASCULAR: denies chest pain on exertion  GI: no nausea or abdominal pain, appetite down  NEURO: admits to headaches as above    EXAM:   /75   Pulse 89   Temp 98.4 °F (36.9 °C) (Oral)   Resp 16   Ht 5' 6.5\" (1.689 m)   Wt 196 lb (88.9 kg)   SpO2 98%   BMI 31.16 kg/m²   GENERAL: well developed, well nourished,in no apparent distress, congested but non toxic appearing  HEENT: atraumatic, normocephalic,ears full and clear, nares with positive rhinorrhea, throat pink with thick PND noted. Uvuvla midline.   Positive maxillary sinus tenderness with palpation.  NECK: supple,posterior cervical adenopathy  LUNGS: clear to auscultation  CARDIO: RRR without murmur      ASSESSMENT AND PLAN:    PLAN:Jerry was seen today for cough.    Diagnoses and all orders for this visit:    Acute maxillary sinusitis, recurrence not specified  -     amoxicillin clavulanate 875-125 MG Oral Tab; Take 1 tablet by mouth 2 (two) times daily for 10 days.    Exposure to influenza    Exposure to pneumonia    Cough in adult      Meds as above. Self care discussed. Medication use and risk/benefit discussed. Patient is advised to follow up with PCP if not improving with treatment plan or seek immediate care if symptoms worsen.  The patient indicates understanding of these issues and agrees to the plan.  Patient Instructions    PLAN: Augmentin, take as directed. Finish all the medication even if you feel better.   Probiotics or yogurt daily during antibiotic use will help decrease stomach upset and restore good bacteria to the gut.  Warm or cold pack to face/head for comfort as needed.  Salt water gargles (1 tsp. Salt in 6 oz lukewarm water), use several times daily to help decrease post nasal drip and soothe throat.  Saline nasal spray to nostrils if needed to help remove drainage or congestion in nose.   Hydrate! (cold or hot based on comfort). Drink lots of water or other non dehydrating liquids to help with illness. Salty foods and soups can help with throat pain and swelling as well.   Hand washing-use hand  or wash hands frequently, cover your cough or sneeze, do not share towels or drinks with others.  May take Delsym over the counter every 12 hours if needed for cough if not contraindicated. Cepacol lozenges or other throat drops may help soothe as well during the day.  May use Tylenol or Ibuprofen over the counter for pain/comfort if not contraindicated.  Follow up in 2 weeks with Dr. Hernández if symptoms persist,  or sooner if worsening  symptoms. Seek immediate care if inability to swallow or breathe.

## 2024-05-06 ENCOUNTER — APPOINTMENT (OUTPATIENT)
Facility: LOCATION | Age: 54
End: 2024-05-06
Attending: PODIATRIST
Payer: COMMERCIAL

## 2024-05-10 ENCOUNTER — APPOINTMENT (OUTPATIENT)
Facility: LOCATION | Age: 54
End: 2024-05-10
Attending: PODIATRIST
Payer: COMMERCIAL

## 2024-05-15 ENCOUNTER — TELEPHONE (OUTPATIENT)
Dept: FAMILY MEDICINE CLINIC | Facility: CLINIC | Age: 54
End: 2024-05-15

## 2024-05-15 ENCOUNTER — OFFICE VISIT (OUTPATIENT)
Dept: FAMILY MEDICINE CLINIC | Facility: CLINIC | Age: 54
End: 2024-05-15

## 2024-05-15 VITALS
WEIGHT: 201 LBS | HEART RATE: 86 BPM | DIASTOLIC BLOOD PRESSURE: 70 MMHG | SYSTOLIC BLOOD PRESSURE: 110 MMHG | OXYGEN SATURATION: 98 % | RESPIRATION RATE: 25 BRPM | TEMPERATURE: 98 F | BODY MASS INDEX: 31.92 KG/M2 | HEIGHT: 66.5 IN

## 2024-05-15 DIAGNOSIS — F32.1 CURRENT MODERATE EPISODE OF MAJOR DEPRESSIVE DISORDER WITHOUT PRIOR EPISODE (HCC): ICD-10-CM

## 2024-05-15 DIAGNOSIS — J18.9 PNEUMONIA OF RIGHT LOWER LOBE DUE TO INFECTIOUS ORGANISM: Primary | ICD-10-CM

## 2024-05-15 PROCEDURE — 3008F BODY MASS INDEX DOCD: CPT | Performed by: FAMILY MEDICINE

## 2024-05-15 PROCEDURE — 99214 OFFICE O/P EST MOD 30 MIN: CPT | Performed by: FAMILY MEDICINE

## 2024-05-15 PROCEDURE — 3078F DIAST BP <80 MM HG: CPT | Performed by: FAMILY MEDICINE

## 2024-05-15 PROCEDURE — 3074F SYST BP LT 130 MM HG: CPT | Performed by: FAMILY MEDICINE

## 2024-05-15 RX ORDER — PROMETHAZINE HYDROCHLORIDE AND CODEINE PHOSPHATE 6.25; 1 MG/5ML; MG/5ML
5 SOLUTION ORAL NIGHTLY
Qty: 180 ML | Refills: 0 | Status: SHIPPED | OUTPATIENT
Start: 2024-05-15 | End: 2024-05-29

## 2024-05-15 RX ORDER — AZITHROMYCIN 250 MG/1
TABLET, FILM COATED ORAL
Qty: 6 TABLET | Refills: 0 | Status: SHIPPED | OUTPATIENT
Start: 2024-05-15 | End: 2024-05-19

## 2024-05-15 RX ORDER — PAROXETINE HYDROCHLORIDE 20 MG/1
20 TABLET, FILM COATED ORAL EVERY MORNING
Qty: 90 TABLET | Refills: 0 | Status: SHIPPED | OUTPATIENT
Start: 2024-05-15

## 2024-05-15 NOTE — PATIENT INSTRUCTIONS
Pneumonia treatment   Antibiotic - azithromycin   Mucinex 1200mg (maxium strength)  -or two 600mg tablets regular strength) twice a day   Increase fluids   Warm/steamy showers   Continue nasal saline rinse - neti pot     [facility]  [address], [address], [address]  [place] [address]  [phone]  [email or web address]    [place] [facility], [facility]   [address], [address]   [place], [place] [address] [phone]   [email or web address]    [facility]  [address], [address]  [place], [place] [id number] [phone]   [email or web address]    [facility]  [address], [address]  [place], [place] [address] [phone]  [email or web address]      *[facility] Members are advised to call [phone] to verify network status

## 2024-05-15 NOTE — PROGRESS NOTES
Subjective:   Minda Stallworth is a 54 year old female who presents for Follow - Up ([facility] 24 cough, cough is worsening, some wheezing) and Post Nasal Drip     Patient presents for follow-up for an acute respiratory illness.  She was seen in the walk-in clinic 9 days ago, started on oral antibiotics for possible sinus infection.  She states despite antibiotic use she continues to have congestion, postnasal drip and has recently started taking have wheezing.  Cough is productive, her  was diagnosed with pneumonia recently.  No fevers or chest pain. Has increased fatigue, occasional chills.        History/Other:    Chief Complaint Reviewed and Verified  Nursing Notes Reviewed and   Verified  Tobacco Reviewed  Allergies Reviewed  Medications Reviewed    Problem List Reviewed  Medical History Reviewed  Surgical History   Reviewed  OB Status Reviewed  Family History Reviewed  Social History   Reviewed         Tobacco:  She smoked tobacco in the past but quit greater than 12 months ago.  Social History     Tobacco Use   Smoking Status Former    Current packs/day: 0.00    Average packs/day: 1 pack/day for 22.0 years (22.0 ttl pk-yrs)    Types: Cigarettes    Start date: 5/15/1991    Quit date: 5/15/2013    Years since quittin.0   Smokeless Tobacco Never        Current Outpatient Medications   Medication Sig Dispense Refill    PARoxetine 20 MG Oral Tab Take 1 tablet (20 mg total) by mouth every morning. 90 tablet 0    azithromycin 250 MG Oral Tab Take 2 tablets (500 mg total) by mouth daily for 1 day, THEN 1 tablet (250 mg total) daily for 4 days. 6 tablet 0    ondansetron (ZOFRAN) 4 mg tablet Take 1 tablet (4 mg total) by mouth every 8 (eight) hours as needed for Nausea. 20 tablet 2    Fezolinetant (VEOZAH) 45 MG Oral Tab Take 45 mg by mouth daily. 30 tablet 5    topiramate 100 MG Oral Tab Take 1 tablet (100 mg total) by mouth 2 (two) times daily. 180 tablet 0    SUMAtriptan Succinate 100 MG Oral Tab  Take 1 tablet (100 mg total) by mouth every 2 (two) hours as needed for Migraine. 9 tablet 5    valACYclovir 500 MG Oral Tab Take 1 tablet (500 mg total) by mouth daily. 90 tablet 1    HYDROcodone-acetaminophen 5-325 MG Oral Tab Take 1 tablet by mouth every 6 (six) hours as needed for Pain. 15 tablet 0    triamcinolone 0.1 % External Cream Apply topically 2 (two) times daily as needed. 60 g 1    Promethazine-Codeine 6.25-10 MG/5ML Oral Solution Take 5 mL by mouth at bedtime for 14 days. 180 mL 0         Review of Systems:  Review of Systems   Constitutional:  Positive for chills and fatigue. Negative for fever.   HENT:  Positive for congestion and postnasal drip. Negative for ear pain and facial swelling.    Eyes: Negative.    Respiratory:  Positive for cough and wheezing. Negative for shortness of breath.    Cardiovascular:  Negative for chest pain and palpitations.   Gastrointestinal: Negative.    Genitourinary: Negative.    Musculoskeletal: Negative.    Psychiatric/Behavioral:  Positive for dysphoric mood.        Objective:   /70   Pulse 86   Temp 98 °F (36.7 °C)   Resp 25   Ht 5' 6.5\" (1.689 m)   Wt 201 lb (91.2 kg)   SpO2 98%   BMI 31.96 kg/m²  Estimated body mass index is 31.96 kg/m² as calculated from the following:    Height as of this encounter: 5' 6.5\" (1.689 m).    Weight as of this encounter: 201 lb (91.2 kg).  Physical Exam  Constitutional:       Appearance: Normal appearance. She is ill-appearing. She is not toxic-appearing.   HENT:      Nose: Congestion and rhinorrhea present.      Mouth/Throat:      Mouth: Mucous membranes are moist.      Pharynx: No oropharyngeal exudate or posterior oropharyngeal erythema.   Eyes:      General: No scleral icterus.     Conjunctiva/sclera: Conjunctivae normal.      Pupils: Pupils are equal, round, and reactive to light.   Cardiovascular:      Rate and Rhythm: Normal rate and regular rhythm.   Pulmonary:      Effort: No tachypnea, accessory muscle usage,  prolonged expiration or respiratory distress.      Breath sounds: No decreased air movement. Examination of the right-lower field reveals rhonchi. Rhonchi present. No decreased breath sounds.   Lymphadenopathy:      Cervical: Cervical adenopathy present.   Neurological:      Mental Status: She is alert.           Assessment & Plan:   1. Pneumonia of right lower lobe due to infectious organism (Primary)  -     Discontinue: Phenylephrine-Bromphen-Codeine; Take 5-10 mL by mouth at bedtime for 14 days.  Dispense: 354 mL; Refill: 0  2. Current moderate episode of major depressive disorder without prior episode (HCC)  Other orders  -     PARoxetine HCl; Take 1 tablet (20 mg total) by mouth every morning.  Dispense: 90 tablet; Refill: 0  -     Azithromycin; Take 2 tablets (500 mg total) by mouth daily for 1 day, THEN 1 tablet (250 mg total) daily for 4 days.  Dispense: 6 tablet; Refill: 0    Clinical signs of pneumonia with hx of resp infection. Start azithro to cover atypical not covered by previous abx   Start Mucinex 1200 mg twice daily, increase fluids, also use warm steamy showers and/or nasal saline rinse  Follow-up if not improving    Increase paroxetine back to 20 mg dose (had decreased after starting alternative therapy for hot flashes).  Counseling resources provided.       Return in about 2 weeks (around 5/29/2024), or if symptoms worsen or fail to improve.    ERIC CARSON MD, 5/15/2024, 10:23 AM

## 2024-05-15 NOTE — TELEPHONE ENCOUNTER
Pt called requesting alternative medication be submitted  for the cough. I did let pt know I had just taken the call from pharmacy and submitted a message with same request.  I did let pt know we are awaiting on Dr. Hernández response.

## 2024-05-15 NOTE — TELEPHONE ENCOUNTER
Pharmacy called stating they don't have Phenylephrine-Bromphen-Codeine 3.33-1.33-6.33 MG/5ML Oral Liquid, available and are requesting alternative option for our pt. Please advice

## 2024-05-15 NOTE — TELEPHONE ENCOUNTER
Pharmacy called & Phenylephrine-Bromphen-Codeine 3.33-1.33-6.33 MG/5ML Oral Liquid is not available, they are requesting alternate Rx for pt's cough.

## 2024-05-16 ENCOUNTER — TELEPHONE (OUTPATIENT)
Dept: PHYSICAL THERAPY | Facility: HOSPITAL | Age: 54
End: 2024-05-16

## 2024-05-17 ENCOUNTER — OFFICE VISIT (OUTPATIENT)
Facility: LOCATION | Age: 54
End: 2024-05-17
Attending: PODIATRIST
Payer: COMMERCIAL

## 2024-05-17 PROCEDURE — 97140 MANUAL THERAPY 1/> REGIONS: CPT

## 2024-05-17 PROCEDURE — 97110 THERAPEUTIC EXERCISES: CPT

## 2024-05-17 NOTE — PROGRESS NOTES
Diagnosis:   Post-operative state (Z98.890)  Left foot pain (M79.672)  Edema of left ankle (M25.472)        Referring Provider: Jose Pro  Date of Evaluation:    4/30324    Precautions:  WBAT in CAM boot for 2 more weeks than transition to ankle brace  Next MD visit:   Date of Surgery:   3/14/24 - Kidner procedure, repair of peroneus brevis tendon, plantar fasciotomy of the (L) LE   Insurance Primary/Secondary: BCBS OUT OF STATE / N/A     # Auth Visits: 16            Subjective: transitioned to shoe and lace up brace; ankle feel fairly well. Did not do much exercise due to being sick     Pain: 0/10 (some tingling)      Objective: see flowsheet      Assessment:   Pt demonstrating improved heel to toe walking pattern post treatment  Restricted TC joint and decreased calf flexibility; audible cavitation with ankle mobilization   Tolerated exercises well with no increase in pain        Goals:   Pt will demonstrate improved DF AROM to >= 10 degrees to promote proper foot clearance during gait and greater ease descending stairs without compensation  Pt will have increased ankle strength to at least 4/5 throughout for improved ankle control with prolonged gait stair negotiation   Pt will have improved SLS to >10s for increased ankle stability with ambulation on uneven surfaces such as gravel and grass   Pt will demonstrate improved heel to toe ambulatory pattern   Pt will be able to perform ADLs, household activities with no reported pain  Pt will be able to walk 2-3 blocks without pain and less reported restriction   Pt will be able to ascend/descend step with adequate control, minimal discomfort, and less restriction   Pt will be independent and compliant with comprehensive HEP to maintain progress achieved in PT     Plan: improve ankle ROM/mobility via soft tissue treatment, exercise, stretching  Date: 5/3/2024  TX#: 2/16 Date: 5/17/24                TX#: 3/16 Date:                 TX#: 4/ Date:                 TX#:  5/ Date:   Tx#: 6/   Manual Therapy NT Manual Therapy (15 min)       TC posterior glide, grade 3  Distal Tibfib posterior glide, grade 3      TherEx (40 min) TherEx (25 min)      Seated ankle circles 30 CW/CCW NuStep x 8 min (L5)      Seated gastroc stretch 30 sec x 3 Seated ankle strengthening GTB all planes x 20      Seated towel scrunches x 1 min Rockerboard gastroc stretch 30 sec x 3      Seated rocker board AP & ML x 3 min Rockerboard AP & ML tapping x 30      Seated heel raise & toe raise 2 x 15 Ankle mobilization on step x 10 (10 second hold)      PROM w/gentle stretching in all planes        HEP:   Access Code: [email or web address]  URL: [email or web address]  Date: 05/17/2024  Prepared by: Nigel Vicente    Exercises  - Seated Ankle Circles  - 2 x daily - 7 x weekly - 3 sets - 10 reps  - Supine Ankle Pumps  - 2 x daily - 7 x weekly - 3 sets - 10 reps  - Long Sitting Calf Stretch with Strap  - 2 x daily - 7 x weekly - 3 sets - 30 seconds hold  - Seated Heel Toe Raises  - 2 x daily - 7 x weekly - 2 sets - 10 reps  - Towel Scrunches  - 2 x daily - 7 x weekly - 3 sets - 10 reps  - Standing Dorsiflexion Self-Mobilization on Step  - 2 x daily - 7 x weekly - 1 sets - 15 reps - 10 seconds hold    Charges:; TherEx 2; Manual 1       Total Timed Treatment: 40 min  Total Treatment Time: 40 min

## 2024-05-20 ENCOUNTER — TELEPHONE (OUTPATIENT)
Dept: ADMINISTRATIVE | Age: 54
End: 2024-05-20

## 2024-05-20 ENCOUNTER — OFFICE VISIT (OUTPATIENT)
Facility: LOCATION | Age: 54
End: 2024-05-20
Attending: PODIATRIST
Payer: COMMERCIAL

## 2024-05-20 PROCEDURE — 97140 MANUAL THERAPY 1/> REGIONS: CPT

## 2024-05-20 PROCEDURE — 97110 THERAPEUTIC EXERCISES: CPT

## 2024-05-20 NOTE — PROGRESS NOTES
Diagnosis:   Post-operative state (Z98.890)  Left foot pain (M79.672)  Edema of left ankle (M25.472)        Referring Provider: Jose Pro  Date of Evaluation:    4/30324    Precautions:  WBAT in CAM boot for 2 more weeks than transition to ankle brace  Next MD visit:   Date of Surgery:   3/14/24 - Kidner procedure, repair of peroneus brevis tendon, plantar fasciotomy of the (L) LE   Insurance Primary/Secondary: BCBS OUT OF STATE / N/A     # Auth Visits: 16            Subjective: some stiffness and numbness but seems to be walking better at home     Pain: 0/10 - just mild to moderate stiffness       Objective: see flowsheet      Assessment:   Improved ankle DF with stretching and mobilization   Ambulatory pattern improving however stiffness and decreased step and stride length persist   Tolerated exercises very well with no increase in pain. Generally feels more loose         Goals:   Pt will demonstrate improved DF AROM to >= 10 degrees to promote proper foot clearance during gait and greater ease descending stairs without compensation  Pt will have increased ankle strength to at least 4/5 throughout for improved ankle control with prolonged gait stair negotiation   Pt will have improved SLS to >10s for increased ankle stability with ambulation on uneven surfaces such as gravel and grass   Pt will demonstrate improved heel to toe ambulatory pattern   Pt will be able to perform ADLs, household activities with no reported pain  Pt will be able to walk 2-3 blocks without pain and less reported restriction   Pt will be able to ascend/descend step with adequate control, minimal discomfort, and less restriction   Pt will be independent and compliant with comprehensive HEP to maintain progress achieved in PT     Plan: improve ankle ROM/mobility via soft tissue treatment, exercise, stretching  Date: 5/3/2024  TX#: 2/16 Date: 5/17/24                TX#: 3/16 Date: 5/20/24                TX#: 4/16 Date:                  TX#: 5/ Date:   Tx#: 6/   Manual Therapy NT Manual Therapy (15 min) Manual Therapy (15 min)      TC posterior glide, grade 3  Distal Tibfib posterior glide, grade 3 TC posterior glide, grade 3  Distal Tibfib posterior glide, grade 3  Subtalar joint mobilization      TherEx (40 min) TherEx (25 min) TherEx (30 min)     Seated ankle circles 30 CW/CCW NuStep x 8 min (L5) Seated ankle strengthening GTB all planes      Seated gastroc stretch 30 sec x 3 Seated ankle strengthening GTB all planes x 20 Rockerboard gastroc stretch 1 min x 3     Seated towel scrunches x 1 min Rockerboard gastroc stretch 30 sec x 3 Rockerboard AP & ML taps x 20 (controlled)      Seated rocker board AP & ML x 3 min Rockerboard AP & ML tapping x 30 Ankle mobilization on step with PT x 10 & independently x 10 (10 second hold)     Seated heel raise & toe raise 2 x 15 Ankle mobilization on step x 10 (10 second hold) (DL) shuttle 100# 2 x 15  (SL) shuttle 62# x 15     PROM w/gentle stretching in all planes   Side step x 3 laps       6 inch FSU x 10   4 inch FSD x 10     HEP:   Access Code: [email or web address]  URL: [email or web address]  Date: 05/17/2024  Prepared by: Nigel Vicente    Exercises  - Seated Ankle Circles  - 2 x daily - 7 x weekly - 3 sets - 10 reps  - Supine Ankle Pumps  - 2 x daily - 7 x weekly - 3 sets - 10 reps  - Long Sitting Calf Stretch with Strap  - 2 x daily - 7 x weekly - 3 sets - 30 seconds hold  - Seated Heel Toe Raises  - 2 x daily - 7 x weekly - 2 sets - 10 reps  - Towel Scrunches  - 2 x daily - 7 x weekly - 3 sets - 10 reps  - Standing Dorsiflexion Self-Mobilization on Step  - 2 x daily - 7 x weekly - 1 sets - 15 reps - 10 seconds hold    Charges:; TherEx 2; Manual    1    Total Timed Treatment: 45 min  Total Treatment Time:  45 min

## 2024-05-20 NOTE — TELEPHONE ENCOUNTER
Duplicate disability forms received in the forms department.    Moved to archive.    Patient requested her completed/revised forms be faxed again to [employer] at # [phone].    Request completed, fax confirmation received.

## 2024-05-22 ENCOUNTER — HOSPITAL ENCOUNTER (OUTPATIENT)
Dept: MAMMOGRAPHY | Age: 54
Discharge: HOME | End: 2024-05-22
Attending: FAMILY MEDICINE

## 2024-05-22 DIAGNOSIS — Z12.31 ENCOUNTER FOR SCREENING MAMMOGRAM FOR MALIGNANT NEOPLASM OF BREAST: ICD-10-CM

## 2024-05-22 PROCEDURE — 77067 SCR MAMMO BI INCL CAD: CPT | Performed by: FAMILY MEDICINE

## 2024-05-22 PROCEDURE — 77063 BREAST TOMOSYNTHESIS BI: CPT | Performed by: FAMILY MEDICINE

## 2024-05-24 ENCOUNTER — OFFICE VISIT (OUTPATIENT)
Facility: LOCATION | Age: 54
End: 2024-05-24
Attending: PODIATRIST
Payer: COMMERCIAL

## 2024-05-24 PROCEDURE — 97110 THERAPEUTIC EXERCISES: CPT

## 2024-05-24 PROCEDURE — 97140 MANUAL THERAPY 1/> REGIONS: CPT

## 2024-05-24 NOTE — PROGRESS NOTES
Diagnosis:   Post-operative state (Z98.890)  Left foot pain (M79.672)  Edema of left ankle (M25.472)        Referring Provider: Jose Pro  Date of Evaluation:    4/30324    Precautions:  WBAT in CAM boot for 2 more weeks than transition to ankle brace  Next MD visit:   Date of Surgery:   3/14/24 - Kidner procedure, repair of peroneus brevis tendon, plantar fasciotomy of the (L) LE   Insurance Primary/Secondary: BCBS OUT OF STATE / N/A     # Auth Visits: 16            Subjective: Front ankle was sore after last session, calmed down today. Walking is better since being out of boot    Pain: 2/10      Objective: see flowsheet      Assessment:   Progressive improvement in gross ankle ROM & mobility. Continues to demonstrate joint restriction and flexibility deficits.   Tolerates exercises fairly well with no increased in any discomfort         Goals:   Pt will demonstrate improved DF AROM to >= 10 degrees to promote proper foot clearance during gait and greater ease descending stairs without compensation  Pt will have increased ankle strength to at least 4/5 throughout for improved ankle control with prolonged gait stair negotiation   Pt will have improved SLS to >10s for increased ankle stability with ambulation on uneven surfaces such as gravel and grass   Pt will demonstrate improved heel to toe ambulatory pattern   Pt will be able to perform ADLs, household activities with no reported pain  Pt will be able to walk 2-3 blocks without pain and less reported restriction   Pt will be able to ascend/descend step with adequate control, minimal discomfort, and less restriction   Pt will be independent and compliant with comprehensive HEP to maintain progress achieved in PT     Plan: improve ankle ROM/mobility via soft tissue treatment, exercise, stretching  Date: 5/3/2024  TX#: 2/16 Date: 5/17/24                TX#: 3/16 Date: 5/20/24                TX#: 4/16 Date:  5/24/24               TX#: 5/16 Date:   Tx#: 6/    Manual Therapy NT Manual Therapy (15 min) Manual Therapy (15 min) Manual Therapy (15 min)     TC posterior glide, grade 3  Distal Tibfib posterior glide, grade 3 TC posterior glide, grade 3  Distal Tibfib posterior glide, grade 3  Subtalar joint mobilization  TC posterior glide, grade 3  Distal Tibfib posterior glide, grade 3  Subtalar joint mobilization     TherEx (40 min) TherEx (25 min) TherEx (30 min) TherEx (30 min)    Seated ankle circles 30 CW/CCW NuStep x 8 min (L5) Seated ankle strengthening GTB all planes  NuStep x 6 min (L5)     Seated gastroc stretch 30 sec x 3 Seated ankle strengthening GTB all planes x 20 Rockerboard gastroc stretch 1 min x 3 Wall gastroc stretch 30 sec x 3    Seated towel scrunches x 1 min Rockerboard gastroc stretch 30 sec x 3 Rockerboard AP & ML taps x 20 (controlled)  Rockerboard AP & ML taps x 30 (controlled)     Seated rocker board AP & ML x 3 min Rockerboard AP & ML tapping x 30 Ankle mobilization on step with PT x 10 & independently x 10 (10 second hold) Ankle mobilization on step with x 10 (10 seconds)    Seated heel raise & toe raise 2 x 15 Ankle mobilization on step x 10 (10 second hold) (DL) shuttle 100# 2 x 15  (SL) shuttle 62# x 15 Tandem stance 30 sec x 3 (B)    PROM w/gentle stretching in all planes   Side step x 3 laps (B) heel raise at bar x 10      6 inch FSU x 10   4 inch FSD x 10     HEP:   Access Code: [email or web address]  URL: [email or web address]  Date: 05/24/2024  Prepared by: Nigel Vicente    Exercises  - Long Sitting Calf Stretch with Strap  - 2 x daily - 7 x weekly - 3 sets - 30 seconds hold  - Standing Dorsiflexion Self-Mobilization on Step  - 1-2 x daily - 7 x weekly - 1 sets - 15 reps - 10 seconds hold  - Gastroc Stretch on Wall  - 1-2 x daily - 7 x weekly - 3 sets - 10 reps  - Long Sitting Ankle Eversion with Resistance  - 1 x daily - 7 x weekly - 3 sets - 10 reps  - Long Sitting Ankle Plantar Flexion with Resistance  - 1 x daily - 7 x  weekly - 3 sets - 10 reps  - Long Sitting Ankle Inversion with Resistance  - 1 x daily - 7 x weekly - 3 sets - 10 reps  - Long Sitting Ankle Dorsiflexion with Anchored Resistance  - 1 x daily - 7 x weekly - 3 sets - 10 reps    Charges:; TherEx 2; Manual 1   Total Timed Treatment: 45 min  Total Treatment Time:  45 min

## 2024-05-28 ENCOUNTER — APPOINTMENT (OUTPATIENT)
Facility: LOCATION | Age: 54
End: 2024-05-28
Attending: PODIATRIST
Payer: COMMERCIAL

## 2024-05-28 ENCOUNTER — OFFICE VISIT (OUTPATIENT)
Facility: LOCATION | Age: 54
End: 2024-05-28

## 2024-05-28 DIAGNOSIS — Z98.890 POST-OPERATIVE STATE: Primary | ICD-10-CM

## 2024-05-28 PROCEDURE — 99024 POSTOP FOLLOW-UP VISIT: CPT | Performed by: PODIATRIST

## 2024-05-28 NOTE — PROGRESS NOTES
[facility]  Progress Note    Minda Stallworth is a 54 year old female.   Chief Complaint   Patient presents with    Post-Op     S/p left foot sx on 3/14/24- Patient ambulating in ankle brace. States that she is doing well. Denies pain at this time. Endorses numbness on lateral side.          HPI:     Patient is a pleasant 54-year-old female who is status post 10+ weeks from Kidner procedure, repair of peroneus brevis tendon tear, plantar fasciotomy, all of the left lower extremity (DOS: 3/14/2024).  Patient states that she is doing very well overall since her last visit.  She is denying any current pain.  She is in physical therapy and has transitioned out of her cam boot and is utilizing her lace up ankle brace today.  She is also ambulating in supportive shoe gear.  She does state that she has more physical therapy scheduled.  Patient does endorse some numbness on the outside of her surgical foot.  Other than that, patient is denying any other concerns.  She is very happy with her outcome thus far.  Denies recent nausea, vomiting, fevers, chills, shortness of breath, chest pain, calf pain.      Allergies: Patient has no known allergies.   Current Outpatient Medications   Medication Sig Dispense Refill    PARoxetine 20 MG Oral Tab Take 1 tablet (20 mg total) by mouth every morning. 90 tablet 0    Promethazine-Codeine 6.25-10 MG/5ML Oral Solution Take 5 mL by mouth at bedtime for 14 days. 180 mL 0    ondansetron (ZOFRAN) 4 mg tablet Take 1 tablet (4 mg total) by mouth every 8 (eight) hours as needed for Nausea. 20 tablet 2    Fezolinetant (VEOZAH) 45 MG Oral Tab Take 45 mg by mouth daily. 30 tablet 5    topiramate 100 MG Oral Tab Take 1 tablet (100 mg total) by mouth 2 (two) times daily. 180 tablet 0    SUMAtriptan Succinate 100 MG Oral Tab Take 1 tablet (100 mg total) by mouth every 2 (two) hours as needed for Migraine. 9 tablet 5    valACYclovir 500 MG Oral Tab Take 1 tablet (500 mg total) by mouth  daily. 90 tablet 1    HYDROcodone-acetaminophen 5-325 MG Oral Tab Take 1 tablet by mouth every 6 (six) hours as needed for Pain. 15 tablet 0    triamcinolone 0.1 % External Cream Apply topically 2 (two) times daily as needed. 60 g 1      Past Medical History:    Abnormal uterine bleeding    Amenorrhea    Arthritis    Decorative tattoo    Depression    Diabetes (HCC)    Headache(784.0)    migraine    Human papilloma virus infection    Hx of motion sickness    Migraines    Plantar fasciitis, bilateral      Past Surgical History:   Procedure Laterality Date    Appendectomy  2023    Colonoscopy      Colposcopy, cervix w/upper adjacent vagina; w/biopsy(s), cervix      Dilation/curettage,diagnostic        1991      Family History   Problem Relation Age of Onset    Neurological Disorder Mother         Alzheimers    Dementia Mother     Heart Attack Father         Heart Attack    Heart Disorder Father     Breast Cancer Maternal Grandmother 72    Cancer Maternal Grandmother       Social History     Socioeconomic History    Marital status:    Tobacco Use    Smoking status: Former     Current packs/day: 0.00     Average packs/day: 1 pack/day for 22.0 years (22.0 ttl pk-yrs)     Types: Cigarettes     Start date: 5/15/1991     Quit date: 5/15/2013     Years since quittin.0    Smokeless tobacco: Never   Vaping Use    Vaping status: Never Used   Substance and Sexual Activity    Alcohol use: Yes     Comment: Occasion    Drug use: No    Sexual activity: Yes     Partners: Male     Birth control/protection: Tubal Ligation   Other Topics Concern    Caffeine Concern Yes    Exercise No    Seat Belt Yes    Special Diet No    Stress Concern No    Weight Concern No           REVIEW OF SYSTEMS:     10 point ROS completed and was negative, except for pertinent positive and negatives stated in subjective.       EXAM:     General: NAD, appropriate and cooperative.  Vascular: Distal pulses intact b/l.   Continue edema to the left foot and ankle consistent with postoperative state.  Integument: Incisions well healed. No dehiscence or drainage, no SOI.  Neurological: Sensation and motor function intact and symmetric compatible with pre-operative state.  Musculoskeletal: s/p Kidner procedure, peroneal tendon tear repair, and plantar fasciotomy of the left lower extremity.  No pain is noted with palpation to all incision sites consistent with postoperative state.  No pain with palpation to plantar medial aspect of the heel.  Patient is able to actively move all of her digits and ankle of the left lower extremity with some limitations in the ankle joint due to stiffness.  Able to actively move her ankle in all directions with minimal discomfort.  All compartments of the foot are soft and compressible.  No acute deformities noted.  5/5 muscle strength to all tendons crossing the ankle joint with no pain elicited.  It appears that all tendons are firing adequately that crosses the ankle joint of the left lower extremity       ASSESSMENT AND PLAN:   Diagnoses and all orders for this visit:    Post-operative state        Plan:   -Patient was seen and evaluated today in clinic.  Chart history reviewed.    Patient is status post procedure, repair of peroneus brevis tendon tear, plantar fasciotomy, all left lower extremity (DOS: 3/14/2024)     Patient is doing well.  Edema has improved and there is no current pain subjectively or objectively today.     Incision sites well-healed.  No current drainage, surrounding erythema, or signs of infection.  No dehiscence noted to all incision sites      Patient will continue weightbearing as tolerated in her lace up ankle brace at this time.  Over the next 1-2 weeks, instructed patient to transition out of ankle brace and into supportive shoe gear as tolerated.     Recommend continue icing and elevating surgical extremity     Patient will continue with physical therapy at this time as  she believes this has been beneficial.     Patient will remain out of work at this time.  Patient will be transitioning to weightbearing as tolerated but will need to be in a cam boot for at least 2 more weeks.  She will then transition to ankle brace for another several weeks while rehabbing her surgical extremity.    Patient does feel ready to return to work.  She was provided a note that she can return to work next week with restrictions of seated rest as needed, as well as utilizing ankle brace for the time being.    -The patient indicates understanding of these issues and agrees to the plan.    Time spent reviewing pertinent information from patient's chart, reviewing any pertinent imaging, obtaining history and physical exam, discussing and mutually agreeing on a treatment plan, and documenting encounter: 20 minutes    RTC 4 weeks      Isreal Pro DPM        5/28/2024    Dragon speech recognition software was used to prepare this note.  Errors in word recognition may occur.  Please contact me with any questions/concerns with this note.

## 2024-05-30 ENCOUNTER — OFFICE VISIT (OUTPATIENT)
Facility: LOCATION | Age: 54
End: 2024-05-30
Attending: PODIATRIST
Payer: COMMERCIAL

## 2024-05-30 PROCEDURE — 97140 MANUAL THERAPY 1/> REGIONS: CPT

## 2024-05-30 PROCEDURE — 97110 THERAPEUTIC EXERCISES: CPT

## 2024-05-30 NOTE — PROGRESS NOTES
Diagnosis:   Post-operative state (Z98.890)  Left foot pain (M79.672)  Edema of left ankle (M25.472)        Referring Provider: Jose Pro  Date of Evaluation:    4/30324    Precautions:  WBAT in CAM boot for 2 more weeks than transition to ankle brace  Next MD visit:   Date of Surgery:   3/14/24 - Kidner procedure, repair of peroneus brevis tendon, plantar fasciotomy of the (L) LE   Insurance Primary/Secondary: BCBS OUT OF STATE / N/A     # Auth Visits: 16            Subjective: had follow up and everything is looking very good; got the ok to go back to work; she is tight but walking better     Pain: 2/10      Objective: see flowsheet      Assessment:   Much improved heel to toe walking pattern.   Restrictions persist in TC & subtalar joint but improving   Calf flexibility improving  Able to descend 8 inch step today with adequate control and DF ROM         Goals:   Pt will demonstrate improved DF AROM to >= 10 degrees to promote proper foot clearance during gait and greater ease descending stairs without compensation  Pt will have increased ankle strength to at least 4/5 throughout for improved ankle control with prolonged gait stair negotiation   Pt will have improved SLS to >10s for increased ankle stability with ambulation on uneven surfaces such as gravel and grass   Pt will demonstrate improved heel to toe ambulatory pattern   Pt will be able to perform ADLs, household activities with no reported pain  Pt will be able to walk 2-3 blocks without pain and less reported restriction   Pt will be able to ascend/descend step with adequate control, minimal discomfort, and less restriction   Pt will be independent and compliant with comprehensive HEP to maintain progress achieved in PT     Plan: improve ankle ROM/mobility via soft tissue treatment, exercise, stretching  Date: 5/3/2024  TX#: 2/16 Date: 5/17/24                TX#: 3/16 Date: 5/20/24                TX#: 4/16 Date:  5/24/24               TX#: 5/16  Date: 5/30/24  Tx#: 6/16   Manual Therapy NT Manual Therapy (15 min) Manual Therapy (15 min) Manual Therapy (15 min) Manual Therapy (10 min)    TC posterior glide, grade 3  Distal Tibfib posterior glide, grade 3 TC posterior glide, grade 3  Distal Tibfib posterior glide, grade 3  Subtalar joint mobilization  TC posterior glide, grade 3  Distal Tibfib posterior glide, grade 3  Subtalar joint mobilization  TC posterior glide, grade 3  Distal Tibfib posterior glide, grade 3   TherEx (40 min) TherEx (25 min) TherEx (30 min) TherEx (30 min) TherEx (30 min)   Seated ankle circles 30 CW/CCW NuStep x 8 min (L5) Seated ankle strengthening GTB all planes  NuStep x 6 min (L5)  NuStep x 6 min (L5)    Seated gastroc stretch 30 sec x 3 Seated ankle strengthening GTB all planes x 20 Rockerboard gastroc stretch 1 min x 3 Wall gastroc stretch 30 sec x 3 Wall gastroc stretch 30 sec x 3   Seated towel scrunches x 1 min Rockerboard gastroc stretch 30 sec x 3 Rockerboard AP & ML taps x 20 (controlled)  Rockerboard AP & ML taps x 30 (controlled)  Rockerboard AP & ML taps x 30 & balance x 1 min (controlled)    Seated rocker board AP & ML x 3 min Rockerboard AP & ML tapping x 30 Ankle mobilization on step with PT x 10 & independently x 10 (10 second hold) Ankle mobilization on step with x 10 (10 seconds) Ankle mobilization on step with x 10 (10 seconds)   Seated heel raise & toe raise 2 x 15 Ankle mobilization on step x 10 (10 second hold) (DL) shuttle 100# 2 x 15  (SL) shuttle 62# x 15 Tandem stance 30 sec x 3 (B) Tandem stance 30 sec x 3 (B)  SLS 30 sec x 2     PROM w/gentle stretching in all planes   Side step x 3 laps (B) heel raise at bar x 10 (B) heel raise at bar x 20     6 inch FSU x 10   4 inch FSD x 10  Shuttle 87# 2 x 15       Side step w/GTB x 2 laps       6 inch FSU/FSD x 10   HEP:   Access Code: [email or web address]  URL: [email or web address]  Date: 05/24/2024  Prepared by: Nigel Botello  Sitting Calf Stretch with Strap  - 2 x daily - 7 x weekly - 3 sets - 30 seconds hold  - Standing Dorsiflexion Self-Mobilization on Step  - 1-2 x daily - 7 x weekly - 1 sets - 15 reps - 10 seconds hold  - Gastroc Stretch on Wall  - 1-2 x daily - 7 x weekly - 3 sets - 10 reps  - Long Sitting Ankle Eversion with Resistance  - 1 x daily - 7 x weekly - 3 sets - 10 reps  - Long Sitting Ankle Plantar Flexion with Resistance  - 1 x daily - 7 x weekly - 3 sets - 10 reps  - Long Sitting Ankle Inversion with Resistance  - 1 x daily - 7 x weekly - 3 sets - 10 reps  - Long Sitting Ankle Dorsiflexion with Anchored Resistance  - 1 x daily - 7 x weekly - 3 sets - 10 reps    Charges:; TherEx 2; Manual 1   Total Timed Treatment: 40 min  Total Treatment Time:  40 min

## 2024-06-04 ENCOUNTER — OFFICE VISIT (OUTPATIENT)
Facility: LOCATION | Age: 54
End: 2024-06-04
Attending: PODIATRIST
Payer: COMMERCIAL

## 2024-06-04 ENCOUNTER — TELEPHONE (OUTPATIENT)
Dept: PODIATRY CLINIC | Facility: CLINIC | Age: 54
End: 2024-06-04

## 2024-06-04 PROCEDURE — 97140 MANUAL THERAPY 1/> REGIONS: CPT

## 2024-06-04 PROCEDURE — 97110 THERAPEUTIC EXERCISES: CPT

## 2024-06-04 NOTE — TELEPHONE ENCOUNTER
Per patient needs a new work note stating why she had the surgery and for how long she needs to wear the brace.Please advise     Per patient needs the letter as soon as possible

## 2024-06-04 NOTE — TELEPHONE ENCOUNTER
From my understanding, we do not provide employers reasons why patients had surgery.  This is a HIPAA issue.  If formal paperwork is needed to be filled out, would recommend sending the paperwork to the forms department.  Okay to write patient a note stating that she should utilize her brace until her next follow-up with me.  Thank you

## 2024-06-04 NOTE — PROGRESS NOTES
Diagnosis:   Post-operative state (Z98.890)  Left foot pain (M79.672)  Edema of left ankle (M25.472)        Referring Provider: Jose Pro  Date of Evaluation:    4/30324    Precautions:  WBAT in CAM boot for 2 more weeks than transition to ankle brace  Next MD visit:   Date of Surgery:   3/14/24 - Kidner procedure, repair of peroneus brevis tendon, plantar fasciotomy of the (L) LE   Insurance Primary/Secondary: BCBS OUT OF STATE / N/A     # Auth Visits: 16            Subjective: doing too much with steps and exercises; she is a bit sore     Pain: 6/10 soreness level      Objective:   See flowsheet      Assessment:   Improving ambulatory pattern with proper heel to toe gait pattern.   Demonstrates decreased TC joint mobility but progressively improving   Tolerating all exercises well without any increases in any pain  6/10 to 3/10 pain post mobilization         Goals:   Pt will demonstrate improved DF AROM to >= 10 degrees to promote proper foot clearance during gait and greater ease descending stairs without compensation  Pt will have increased ankle strength to at least 4/5 throughout for improved ankle control with prolonged gait stair negotiation   Pt will have improved SLS to >10s for increased ankle stability with ambulation on uneven surfaces such as gravel and grass   Pt will demonstrate improved heel to toe ambulatory pattern   Pt will be able to perform ADLs, household activities with no reported pain  Pt will be able to walk 2-3 blocks without pain and less reported restriction   Pt will be able to ascend/descend step with adequate control, minimal discomfort, and less restriction   Pt will be independent and compliant with comprehensive HEP to maintain progress achieved in PT     Plan: improve ankle ROM/mobility via soft tissue treatment, exercise, stretching  Date: 5/3/2024  TX#: 2/16 Date: 5/17/24                TX#: 3/16 Date: 5/20/24                TX#: 4/16 Date:  5/24/24               TX#: 5/16  Date: 5/30/24  Tx#: 6/16 Date: 6/4/24  Tx#: 7/16   Manual Therapy NT Manual Therapy (15 min) Manual Therapy (15 min) Manual Therapy (15 min) Manual Therapy (10 min) Manual Therapy (25 min)    TC posterior glide, grade 3  Distal Tibfib posterior glide, grade 3 TC posterior glide, grade 3  Distal Tibfib posterior glide, grade 3  Subtalar joint mobilization  TC posterior glide, grade 3  Distal Tibfib posterior glide, grade 3  Subtalar joint mobilization  TC posterior glide, grade 3  Distal Tibfib posterior glide, grade 3 TC posterior glide, grade 3  Distal Tibfib posterior glide, grade 3   TherEx (40 min) TherEx (25 min) TherEx (30 min) TherEx (30 min) TherEx (30 min) TherEx (15 min)   Seated ankle circles 30 CW/CCW NuStep x 8 min (L5) Seated ankle strengthening GTB all planes  NuStep x 6 min (L5)  NuStep x 6 min (L5)  NuStep x 6 min (L5)   Seated gastroc stretch 30 sec x 3 Seated ankle strengthening GTB all planes x 20 Rockerboard gastroc stretch 1 min x 3 Wall gastroc stretch 30 sec x 3 Wall gastroc stretch 30 sec x 3 Rocker board AP & ML taps x 30 & balance x 1 min each   Seated towel scrunches x 1 min Rockerboard gastroc stretch 30 sec x 3 Rockerboard AP & ML taps x 20 (controlled)  Rockerboard AP & ML taps x 30 (controlled)  Rockerboard AP & ML taps x 30 & balance x 1 min (controlled)  Ankle mobilization on step x 10 (10 second hold)   Seated rocker board AP & ML x 3 min Rockerboard AP & ML tapping x 30 Ankle mobilization on step with PT x 10 & independently x 10 (10 second hold) Ankle mobilization on step with x 10 (10 seconds) Ankle mobilization on step with x 10 (10 seconds) Wall gastroc & soleus stretch 30 sec x 2   Seated heel raise & toe raise 2 x 15 Ankle mobilization on step x 10 (10 second hold) (DL) shuttle 100# 2 x 15  (SL) shuttle 62# x 15 Tandem stance 30 sec x 3 (B) Tandem stance 30 sec x 3 (B)  SLS 30 sec x 2   Tandem stance 30 sec x 3   SLS 30 sec x 2  Tandem stance on airex pad 30 sec x 2   PROM  w/gentle stretching in all planes   Side step x 3 laps (B) heel raise at bar x 10 (B) heel raise at bar x 20 Shuttle (L) 100# 2 x 15     6 inch FSU x 10   4 inch FSD x 10  Shuttle 87# 2 x 15        Side step w/GTB x 2 laps        6 inch FSU/FSD x 10    HEP:   Access Code: [email or web address]  URL: [email or web address]  Date: 05/24/2024  Prepared by: Nigel Vicente    Exercises  - Long Sitting Calf Stretch with Strap  - 2 x daily - 7 x weekly - 3 sets - 30 seconds hold  - Standing Dorsiflexion Self-Mobilization on Step  - 1-2 x daily - 7 x weekly - 1 sets - 15 reps - 10 seconds hold  - Gastroc Stretch on Wall  - 1-2 x daily - 7 x weekly - 3 sets - 10 reps  - Long Sitting Ankle Eversion with Resistance  - 1 x daily - 7 x weekly - 3 sets - 10 reps  - Long Sitting Ankle Plantar Flexion with Resistance  - 1 x daily - 7 x weekly - 3 sets - 10 reps  - Long Sitting Ankle Inversion with Resistance  - 1 x daily - 7 x weekly - 3 sets - 10 reps  - Long Sitting Ankle Dorsiflexion with Anchored Resistance  - 1 x daily - 7 x weekly - 3 sets - 10 reps    Charges:; TherEx 1; Manual 2   Total Timed Treatment: 40 min  Total Treatment Time:  40 min

## 2024-06-06 ENCOUNTER — OFFICE VISIT (OUTPATIENT)
Facility: LOCATION | Age: 54
End: 2024-06-06
Attending: PODIATRIST
Payer: COMMERCIAL

## 2024-06-06 PROCEDURE — 97110 THERAPEUTIC EXERCISES: CPT

## 2024-06-06 PROCEDURE — 97140 MANUAL THERAPY 1/> REGIONS: CPT

## 2024-06-06 NOTE — TELEPHONE ENCOUNTER
Jose Pro DPM  [facility] Clinical Staff38 minutes ago (12:27 PM)       Thank you for doing work note.  I would recommend OTC inserts from my list that I have in clinic.  Thank you      6

## 2024-06-06 NOTE — TELEPHONE ENCOUNTER
Spoke with patient and gave her names of over the counter orthotics. Confirmed she received work note. No further questions or concerns at this time.

## 2024-06-06 NOTE — TELEPHONE ENCOUNTER
Disability forms received in forms dept. Possible revision. Rep who completed previous forms informed.

## 2024-06-06 NOTE — TELEPHONE ENCOUNTER
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 Pro / 2024  -Click Acknowledge located in the top right ribbon/menu (it  has been moved)  -Drag the mouse into the blank space of the document and a + sign will   appear. Left click to electronically sign the document.     Thank you for your time and assistance!     [facility]

## 2024-06-06 NOTE — PROGRESS NOTES
Diagnosis:   Post-operative state (Z98.890)  Left foot pain (M79.672)  Edema of left ankle (M25.472)        Referring Provider: Jose Pro  Date of Evaluation:    4/30324    Precautions:  WBAT in CAM boot for 2 more weeks than transition to ankle brace  Next MD visit:   Date of Surgery:   3/14/24 - Kidner procedure, repair of peroneus brevis tendon, plantar fasciotomy of the (L) LE   Insurance Primary/Secondary: BCBS OUT OF STATE / N/A     # Auth Visits: 16            Subjective: went back to work and was really sore afterward (lifting boxes and other things as well as being on feet)    Pain: 1-2/10 soreness level      Objective:   See flowsheet      Assessment:   Tolerated treatment session fairly well. A bit more sore today due to work yesterday   Encouraged compression stocking if possible to help reduce swelling at end of day   Responds very well to manual TC posterior glide. Encouraged intermittent stretching at work         Goals:   Pt will demonstrate improved DF AROM to >= 10 degrees to promote proper foot clearance during gait and greater ease descending stairs without compensation  Pt will have increased ankle strength to at least 4/5 throughout for improved ankle control with prolonged gait stair negotiation   Pt will have improved SLS to >10s for increased ankle stability with ambulation on uneven surfaces such as gravel and grass   Pt will demonstrate improved heel to toe ambulatory pattern   Pt will be able to perform ADLs, household activities with no reported pain  Pt will be able to walk 2-3 blocks without pain and less reported restriction   Pt will be able to ascend/descend step with adequate control, minimal discomfort, and less restriction   Pt will be independent and compliant with comprehensive HEP to maintain progress achieved in PT     Plan: improve ankle ROM/mobility via soft tissue treatment, exercise, stretching  Date: 5/3/2024  TX#: 2/16 Date: 5/17/24                TX#: 3/16 Date:  5/20/24                TX#: 4/16 Date:  5/24/24               TX#: 5/16 Date: 5/30/24  Tx#: 6/16 Date: 6/4/24  Tx#: 7/16 Date: 6/6/24  Tx#: 8/16   Manual Therapy NT Manual Therapy (15 min) Manual Therapy (15 min) Manual Therapy (15 min) Manual Therapy (10 min) Manual Therapy (25 min) Manual Therapy (15 min)    TC posterior glide, grade 3  Distal Tibfib posterior glide, grade 3 TC posterior glide, grade 3  Distal Tibfib posterior glide, grade 3  Subtalar joint mobilization  TC posterior glide, grade 3  Distal Tibfib posterior glide, grade 3  Subtalar joint mobilization  TC posterior glide, grade 3  Distal Tibfib posterior glide, grade 3 TC posterior glide, grade 3  Distal Tibfib posterior glide, grade 3 STM to (L) gastroc & soleus   TC posterior glide, grade 3/4   TherEx (40 min) TherEx (25 min) TherEx (30 min) TherEx (30 min) TherEx (30 min) TherEx (15 min) TherEx (25 min)   Seated ankle circles 30 CW/CCW NuStep x 8 min (L5) Seated ankle strengthening GTB all planes  NuStep x 6 min (L5)  NuStep x 6 min (L5)  NuStep x 6 min (L5) NuStep x 8 min (L5)   Seated gastroc stretch 30 sec x 3 Seated ankle strengthening GTB all planes x 20 Rockerboard gastroc stretch 1 min x 3 Wall gastroc stretch 30 sec x 3 Wall gastroc stretch 30 sec x 3 Rocker board AP & ML taps x 30 & balance x 1 min each Rocker board gastroc & soleus stretch 30 sec x 3   Seated towel scrunches x 1 min Rockerboard gastroc stretch 30 sec x 3 Rockerboard AP & ML taps x 20 (controlled)  Rockerboard AP & ML taps x 30 (controlled)  Rockerboard AP & ML taps x 30 & balance x 1 min (controlled)  Ankle mobilization on step x 10 (10 second hold) Half kneeling ankle DF stretch 10 sec x 10    Seated rocker board AP & ML x 3 min Rockerboard AP & ML tapping x 30 Ankle mobilization on step with PT x 10 & independently x 10 (10 second hold) Ankle mobilization on step with x 10 (10 seconds) Ankle mobilization on step with x 10 (10 seconds) Wall gastroc & soleus stretch 30  sec x 2 Tandem stance on airex pad 30 sec x 2  SLS on ground 30 sec x 2   Seated heel raise & toe raise 2 x 15 Ankle mobilization on step x 10 (10 second hold) (DL) shuttle 100# 2 x 15  (SL) shuttle 62# x 15 Tandem stance 30 sec x 3 (B) Tandem stance 30 sec x 3 (B)  SLS 30 sec x 2   Tandem stance 30 sec x 3   SLS 30 sec x 2  Tandem stance on airex pad 30 sec x 2 HEP review, updated, handout given   PROM w/gentle stretching in all planes   Side step x 3 laps (B) heel raise at bar x 10 (B) heel raise at bar x 20 Shuttle (L) 100# 2 x 15      6 inch FSU x 10   4 inch FSD x 10  Shuttle 87# 2 x 15         Side step w/GTB x 2 laps         6 inch FSU/FSD x 10     HEP:   Access Code: [email or web address]  URL: [email or web address]  Date: 05/24/2024  Prepared by: Nigel Vicente    Exercises  - Long Sitting Calf Stretch with Strap  - 2 x daily - 7 x weekly - 3 sets - 30 seconds hold  - Standing Dorsiflexion Self-Mobilization on Step  - 1-2 x daily - 7 x weekly - 1 sets - 15 reps - 10 seconds hold  - Gastroc Stretch on Wall  - 1-2 x daily - 7 x weekly - 3 sets - 10 reps  - Long Sitting Ankle Eversion with Resistance  - 1 x daily - 7 x weekly - 3 sets - 10 reps  - Long Sitting Ankle Plantar Flexion with Resistance  - 1 x daily - 7 x weekly - 3 sets - 10 reps  - Long Sitting Ankle Inversion with Resistance  - 1 x daily - 7 x weekly - 3 sets - 10 reps  - Long Sitting Ankle Dorsiflexion with Anchored Resistance  - 1 x daily - 7 x weekly - 3 sets - 10 reps    Charges:; TherEx 2; Manual 1  Total Timed Treatment: 40 min  Total Treatment Time:  40 min

## 2024-06-06 NOTE — TELEPHONE ENCOUNTER
Spoke with patient. We discussed note and she stated she just needs release back to work and that needs to be allowed to wear brace. She indicated that it should state that she had surgery or is recovering from surgery but does not need to have specifics. I have written notes to this effect before, so I will craft note and send it to her through [employer] per her request. Note created and sent.    She also had a question if MD would recommend orthotics/inserts for her in the meantime until her next appointment.

## 2024-06-07 NOTE — TELEPHONE ENCOUNTER
Revised short term disability forms all have been faxed successfully - received confirmations - and I have sent patient a [employer] message.

## 2024-06-07 NOTE — TELEPHONE ENCOUNTER
Revised short term disability form has been faxed to [employer] - fax #: [phone], and now, awaiting a fax confirmation.

## 2024-06-10 ENCOUNTER — TELEPHONE (OUTPATIENT)
Dept: PHYSICAL THERAPY | Facility: HOSPITAL | Age: 54
End: 2024-06-10

## 2024-06-11 ENCOUNTER — APPOINTMENT (OUTPATIENT)
Facility: LOCATION | Age: 54
End: 2024-06-11
Attending: PODIATRIST
Payer: COMMERCIAL

## 2024-06-13 ENCOUNTER — APPOINTMENT (OUTPATIENT)
Facility: LOCATION | Age: 54
End: 2024-06-13
Attending: PODIATRIST
Payer: COMMERCIAL

## 2024-06-17 RX ORDER — TOPIRAMATE 100 MG/1
100 TABLET, FILM COATED ORAL 2 TIMES DAILY
Qty: 180 TABLET | Refills: 0 | Status: SHIPPED | OUTPATIENT
Start: 2024-06-17

## 2024-06-18 ENCOUNTER — APPOINTMENT (OUTPATIENT)
Facility: LOCATION | Age: 54
End: 2024-06-18
Attending: PODIATRIST
Payer: COMMERCIAL

## 2024-06-20 ENCOUNTER — OFFICE VISIT (OUTPATIENT)
Facility: LOCATION | Age: 54
End: 2024-06-20
Attending: PODIATRIST
Payer: COMMERCIAL

## 2024-06-20 PROCEDURE — 97140 MANUAL THERAPY 1/> REGIONS: CPT

## 2024-06-20 PROCEDURE — 97110 THERAPEUTIC EXERCISES: CPT

## 2024-06-20 NOTE — PROGRESS NOTES
Diagnosis:   Post-operative state (Z98.890)  Left foot pain (M79.672)  Edema of left ankle (M25.472)        Referring Provider: Jose Pro  Date of Evaluation:    4/30324    Precautions:  WBAT in CAM boot for 2 more weeks than transition to ankle brace  Next MD visit:   Date of Surgery:   3/14/24 - Kidner procedure, repair of peroneus brevis tendon, plantar fasciotomy of the (L) LE   Insurance Primary/Secondary: BCBS OUT OF STATE / N/A     # Auth Visits: 16            Subjective: Gets really swollen at work, however not wearing the compression sock as recommended. Everyday activity is much better but working a lot has caused some increased pain and swelling    Pain: 0/10   (9/10 pain at end of work)      Objective:   AROM (L) DF 15 degrees (knee straight), PF 55 degrees, Inversion 30 degrees, Eversion 15 degrees      Assessment:   Pt tolerated treatment well with no significant issues.   Generally feels more loose with mobilization of TC/distal tibfib joint and stretching   Overall very good ROM/mobility. Encouraged wearing compression sock while at work over course of full day due to prolonged standing        Goals:   Pt will demonstrate improved DF AROM to >= 10 degrees to promote proper foot clearance during gait and greater ease descending stairs without compensation  Pt will have increased ankle strength to at least 4/5 throughout for improved ankle control with prolonged gait stair negotiation   Pt will have improved SLS to >10s for increased ankle stability with ambulation on uneven surfaces such as gravel and grass   Pt will demonstrate improved heel to toe ambulatory pattern   Pt will be able to perform ADLs, household activities with no reported pain  Pt will be able to walk 2-3 blocks without pain and less reported restriction   Pt will be able to ascend/descend step with adequate control, minimal discomfort, and less restriction   Pt will be independent and compliant with comprehensive HEP to maintain  progress achieved in PT     Plan: improve ankle ROM/mobility via soft tissue treatment, exercise, stretching  Date: 5/3/2024  TX#: 2/16 Date: 5/17/24                TX#: 3/16 Date: 5/20/24                TX#: 4/16 Date:  5/24/24               TX#: 5/16 Date: 5/30/24  Tx#: 6/16 Date: 6/4/24  Tx#: 7/16 Date: 6/6/24  Tx#: 8/16 Date: 6/20/24  Tx#: 9/16   Manual Therapy NT Manual Therapy (15 min) Manual Therapy (15 min) Manual Therapy (15 min) Manual Therapy (10 min) Manual Therapy (25 min) Manual Therapy (15 min) Manual Therapy (15 min)    TC posterior glide, grade 3  Distal Tibfib posterior glide, grade 3 TC posterior glide, grade 3  Distal Tibfib posterior glide, grade 3  Subtalar joint mobilization  TC posterior glide, grade 3  Distal Tibfib posterior glide, grade 3  Subtalar joint mobilization  TC posterior glide, grade 3  Distal Tibfib posterior glide, grade 3 TC posterior glide, grade 3  Distal Tibfib posterior glide, grade 3 STM to (L) gastroc & soleus   TC posterior glide, grade 3/4 STM to (L) gastroc & soleus   TC & distal tibfib posterior glide, grade 3/4       TherEx (40 min) TherEx (25 min) TherEx (30 min) TherEx (30 min) TherEx (30 min) TherEx (15 min) TherEx (25 min) TherEx (25 min)   Seated ankle circles 30 CW/CCW NuStep x 8 min (L5) Seated ankle strengthening GTB all planes  NuStep x 6 min (L5)  NuStep x 6 min (L5)  NuStep x 6 min (L5) NuStep x 8 min (L5) NuStep x 8 min (L5)   Seated gastroc stretch 30 sec x 3 Seated ankle strengthening GTB all planes x 20 Rockerboard gastroc stretch 1 min x 3 Wall gastroc stretch 30 sec x 3 Wall gastroc stretch 30 sec x 3 Rocker board AP & ML taps x 30 & balance x 1 min each Rocker board gastroc & soleus stretch 30 sec x 3 Wall gastroc stretch 30 sec x 3  Wall soleus stretch 30 sec x 3   Seated towel scrunches x 1 min Rockerboard gastroc stretch 30 sec x 3 Rockerboard AP & ML taps x 20 (controlled)  Rockerboard AP & ML taps x 30 (controlled)  Rockerboard AP & ML taps x  30 & balance x 1 min (controlled)  Ankle mobilization on step x 10 (10 second hold) Half kneeling ankle DF stretch 10 sec x 10  8 inch FSU/FSD x 15   Seated rocker board AP & ML x 3 min Rockerboard AP & ML tapping x 30 Ankle mobilization on step with PT x 10 & independently x 10 (10 second hold) Ankle mobilization on step with x 10 (10 seconds) Ankle mobilization on step with x 10 (10 seconds) Wall gastroc & soleus stretch 30 sec x 2 Tandem stance on airex pad 30 sec x 2  SLS on ground 30 sec x 2 Tandem stance on airex pad 30 sec x 3  SLS on ground 30 sec x 2   Seated heel raise & toe raise 2 x 15 Ankle mobilization on step x 10 (10 second hold) (DL) shuttle 100# 2 x 15  (SL) shuttle 62# x 15 Tandem stance 30 sec x 3 (B) Tandem stance 30 sec x 3 (B)  SLS 30 sec x 2   Tandem stance 30 sec x 3   SLS 30 sec x 2  Tandem stance on airex pad 30 sec x 2 HEP review, updated, handout given Shuttle (SL) 125# 2 x 15   PROM w/gentle stretching in all planes   Side step x 3 laps (B) heel raise at bar x 10 (B) heel raise at bar x 20 Shuttle (L) 100# 2 x 15       6 inch FSU x 10   4 inch FSD x 10  Shuttle 87# 2 x 15          Side step w/GTB x 2 laps          6 inch FSU/FSD x 10      HEP:   Access Code: [email or web address]  URL: [email or web address]  Date: 05/24/2024  Prepared by: Nigel Vicente    Exercises  - Long Sitting Calf Stretch with Strap  - 2 x daily - 7 x weekly - 3 sets - 30 seconds hold  - Standing Dorsiflexion Self-Mobilization on Step  - 1-2 x daily - 7 x weekly - 1 sets - 15 reps - 10 seconds hold  - Gastroc Stretch on Wall  - 1-2 x daily - 7 x weekly - 3 sets - 10 reps  - Long Sitting Ankle Eversion with Resistance  - 1 x daily - 7 x weekly - 3 sets - 10 reps  - Long Sitting Ankle Plantar Flexion with Resistance  - 1 x daily - 7 x weekly - 3 sets - 10 reps  - Long Sitting Ankle Inversion with Resistance  - 1 x daily - 7 x weekly - 3 sets - 10 reps  - Long Sitting Ankle Dorsiflexion with Anchored  Resistance  - 1 x daily - 7 x weekly - 3 sets - 10 reps    Charges:; TherEx 2; Manual 1 Total Timed Treatment: 40 min  Total Treatment Time:  40 min

## 2024-06-24 ENCOUNTER — APPOINTMENT (OUTPATIENT)
Facility: LOCATION | Age: 54
End: 2024-06-24
Attending: PODIATRIST
Payer: COMMERCIAL

## 2024-06-27 ENCOUNTER — APPOINTMENT (OUTPATIENT)
Facility: LOCATION | Age: 54
End: 2024-06-27
Attending: PODIATRIST
Payer: COMMERCIAL

## 2024-06-28 ENCOUNTER — OFFICE VISIT (OUTPATIENT)
Facility: LOCATION | Age: 54
End: 2024-06-28

## 2024-06-28 DIAGNOSIS — Z98.890 POST-OPERATIVE STATE: Primary | ICD-10-CM

## 2024-06-28 PROCEDURE — 99212 OFFICE O/P EST SF 10 MIN: CPT | Performed by: PODIATRIST

## 2024-06-28 NOTE — PROGRESS NOTES
[facility]  Progress Note    Minda Stallworth is a 54 year old female.   Chief Complaint   Patient presents with    Post-Op     S/p L foot sx on 3/14/2024 - denies pain         HPI:     Patient is a pleasant 54-year-old female who is status post Kidner procedure, repair of peroneus brevis tendon tear, plantar fasciotomy, all of the left lower extremity (DOS: 3/14/2024).  Patient states that she is doing well overall.  She continues to do physical therapy, which she states has been beneficial but feels like she has received maximal benefit from it.  She is denying any current pain or limitations.  She is ambulating in supportive sandals today.  States that she did obtain the recommended over-the-counter inserts, which she states has been very helpful.  Patient also states that she is back at work, and does get some swelling and discomfort towards the end of her day, which improves with resting and icing at night.  She is very happy with her outcome.  Patient has no other concerns at this time.  Denies recent nausea, vomiting, fevers, chills.      Allergies: Patient has no known allergies.   Current Outpatient Medications   Medication Sig Dispense Refill    TOPIRAMATE 100 MG Oral Tab TAKE ONE TABLET BY MOUTH TWICE DAILY 180 tablet 0    PARoxetine 20 MG Oral Tab Take 1 tablet (20 mg total) by mouth every morning. 90 tablet 0    ondansetron (ZOFRAN) 4 mg tablet Take 1 tablet (4 mg total) by mouth every 8 (eight) hours as needed for Nausea. 20 tablet 2    Fezolinetant (VEOZAH) 45 MG Oral Tab Take 45 mg by mouth daily. 30 tablet 5    SUMAtriptan Succinate 100 MG Oral Tab Take 1 tablet (100 mg total) by mouth every 2 (two) hours as needed for Migraine. 9 tablet 5    valACYclovir 500 MG Oral Tab Take 1 tablet (500 mg total) by mouth daily. 90 tablet 1    HYDROcodone-acetaminophen 5-325 MG Oral Tab Take 1 tablet by mouth every 6 (six) hours as needed for Pain. (Patient not taking: Reported on 5/28/2024) 15  tablet 0    triamcinolone 0.1 % External Cream Apply topically 2 (two) times daily as needed. 60 g 1      Past Medical History:    Abnormal uterine bleeding    Amenorrhea    Arthritis    Decorative tattoo    Depression    Diabetes (HCC)    Headache(784.0)    migraine    Human papilloma virus infection    Hx of motion sickness    Migraines    Plantar fasciitis, bilateral      Past Surgical History:   Procedure Laterality Date    Appendectomy  2023    Colonoscopy      Colposcopy, cervix w/upper adjacent vagina; w/biopsy(s), cervix      Dilation/curettage,diagnostic        1991      Family History   Problem Relation Age of Onset    Neurological Disorder Mother         Alzheimers    Dementia Mother     Heart Attack Father         Heart Attack    Heart Disorder Father     Breast Cancer Maternal Grandmother 72    Cancer Maternal Grandmother       Social History     Socioeconomic History    Marital status:    Tobacco Use    Smoking status: Former     Current packs/day: 0.00     Average packs/day: 1 pack/day for 22.0 years (22.0 ttl pk-yrs)     Types: Cigarettes     Start date: 5/15/1991     Quit date: 5/15/2013     Years since quittin.1    Smokeless tobacco: Never   Vaping Use    Vaping status: Never Used   Substance and Sexual Activity    Alcohol use: Yes     Comment: Occasion    Drug use: No    Sexual activity: Yes     Partners: Male     Birth control/protection: Tubal Ligation   Other Topics Concern    Caffeine Concern Yes    Exercise No    Seat Belt Yes    Special Diet No    Stress Concern No    Weight Concern No           REVIEW OF SYSTEMS:     10 point ROS completed and was negative, except for pertinent positive and negatives stated in subjective.       EXAM:     General: NAD, appropriate and cooperative.  Vascular: Distal pulses intact b/l.  Continue edema to the left foot and ankle consistent with postoperative state.  Integument: Incisions well healed. No dehiscence or  drainage, no SOI.  Neurological: Sensation and motor function intact and symmetric compatible with pre-operative state.  Musculoskeletal: s/p Kidner procedure, peroneal tendon tear repair, and plantar fasciotomy of the left lower extremity.  No pain is noted with palpation to all incision sites.  No pain with palpation to plantar medial aspect of the heel.  Patient is able to actively move all of her digits and ankle of the left lower extremity with no limitation/stiffness to the ankle joint.  Able to actively move her ankle in all directions with no discomfort.  All compartments of the foot are soft and compressible.  No acute deformities noted.  5/5 muscle strength to all tendons crossing the ankle joint with no pain elicited.  It appears that all tendons are firing adequately that crosses the ankle joint of the left lower extremity       ASSESSMENT AND PLAN:   Diagnoses and all orders for this visit:    Post-operative state        Plan:   -Patient was seen and evaluated today in clinic.  Chart history reviewed.    Patient is status post Kidner procedure, repair of peroneus brevis tendon tear, plantar fasciotomy, all left lower extremity (DOS: 3/14/2024).     Patient is doing well and is very happy with her outcome.  Benign exam today.  No longer having pain and no current edema.     Incision sites well-healed.  No current drainage, surrounding erythema, or signs of infection.  No dehiscence noted to all incision sites      Patient will continue to weight-bear as tolerated in supportive shoe gear with her OTC supportive inserts.    Recommend continue icing and elevating surgical extremity as needed     Patient states that she has received maximal benefit from physical therapy.  Okay for her to discontinue.  I do recommend she continue performing exercises that she learned daily at home.    Encourage patient to follow-up with any concerns in the future.    -The patient indicates understanding of these issues and  agrees to the plan.    Time spent reviewing pertinent information from patient's chart, reviewing any pertinent imaging, obtaining history and physical exam, discussing and mutually agreeing on a treatment plan, and documenting encounter: 15 minutes    RTC as needed      Isreal Pro DPM        6/28/2024    Dragon speech recognition software was used to prepare this note.  Errors in word recognition may occur.  Please contact me with any questions/concerns with this note.

## 2024-08-26 NOTE — TELEPHONE ENCOUNTER
Medication(s) to Refill:   Requested Prescriptions     Pending Prescriptions Disp Refills    VEOZAH 45 MG Oral Tab [Pharmacy Med Name: Veozah 45 Mg Tab Aste] 30 tablet 0     Sig: TAKE ONE TABLET BY MOUTH ONE TIME DAILY         Reason for Medication Refill being sent to Provider / Reason Protocol Failed:  [] 90 day refill has already been granted  [] Blood Pressure out of range  [] Labs Abnormal/over due  [] Medication not previously prescribed by Provider  [] Non-Protocol Medication  [] Controlled Substance   [] Due for appointment- no future appointment scheduled  [] No Follow up specified      Last Time Medication was Filled:  2/12/24      Last Office Visit with PCP: 5/15/24    When Patient was Due Back to the Office:  2 weeks  (from when PCP last addressed condition)    Future Appointments:  No future appointments.      Last Blood Pressures:  BP Readings from Last 2 Encounters:   05/15/24 110/70   05/04/24 110/75           Action taken:  [] Refill approved per protocol  [] Routing to provider for approval

## 2024-08-27 RX ORDER — FEZOLINETANT 45 MG/1
1 TABLET, FILM COATED ORAL DAILY
Qty: 30 TABLET | Refills: 11 | Status: SHIPPED | OUTPATIENT
Start: 2024-08-27

## 2024-08-28 RX ORDER — FEZOLINETANT 45 MG/1
1 TABLET, FILM COATED ORAL DAILY
Qty: 30 TABLET | Refills: 0 | OUTPATIENT
Start: 2024-08-28

## 2024-09-03 ENCOUNTER — TELEPHONE (OUTPATIENT)
Dept: FAMILY MEDICINE CLINIC | Facility: CLINIC | Age: 54
End: 2024-09-03

## 2024-09-03 RX ORDER — FEZOLINETANT 45 MG/1
1 TABLET, FILM COATED ORAL DAILY
Qty: 30 TABLET | Refills: 11 | Status: SHIPPED | OUTPATIENT
Start: 2024-09-03

## 2024-09-03 RX ORDER — TOPIRAMATE 100 MG/1
100 TABLET, FILM COATED ORAL 2 TIMES DAILY
Qty: 180 TABLET | Refills: 0 | Status: SHIPPED | OUTPATIENT
Start: 2024-09-03

## 2024-09-03 RX ORDER — FEZOLINETANT 45 MG/1
1 TABLET, FILM COATED ORAL DAILY
Qty: 30 TABLET | Refills: 0 | OUTPATIENT
Start: 2024-09-03

## 2024-09-03 NOTE — TELEPHONE ENCOUNTER
Medicare Wellness Visit  Plan for Preventive Care    A good way for you to stay healthy is to use preventive care.  Medicare covers many services that can help you stay healthy.* The goal of these services is to find any health problems as quickly as possible. Finding problems early can help make them easier to treat.  Your personal plan below lists the services you may need and when they are due.     Health Maintenance Summary     Hepatitis C Screening (Once)  Overdue - never done    Shingles Vaccine (2 of 3)  Overdue since 4/3/2015    Osteoporosis Screening (Once)  Overdue - never done    Medicare Wellness Visit (Yearly)  Due since 12/4/2021    DTaP/Tdap/Td Vaccine (2 - Td or Tdap)  Next due on 4/18/2022    Breast Cancer Screening (Every 2 Years)  Next due on 12/3/2022    Depression Screening (Yearly)  Next due on 12/14/2022    Colorectal Cancer Screen- (Colonoscopy - Every 10 Years)  Next due on 10/21/2030    Pneumococcal Vaccine 65+   Completed    COVID-19 Vaccine   Completed    Influenza Vaccine   Completed    Hepatitis B Vaccine   Aged Out    Meningococcal Vaccine   Aged Out    HPV Vaccine   Aged Out           Preventive Care for Women and Men    Heart Screenings (Cardiovascular):  · Blood tests are used to check your cholesterol, lipid and triglyceride levels. High levels can increase your risk for heart disease and stroke. High levels can be treated with medications, diet and exercise. Lowering your levels can help keep your heart and blood vessels healthy.  Your provider will order these tests if they are needed.    · An ultrasound is done to see if you have an abdominal aortic aneurysm (AAA).  This is an enlargement of one of the main blood vessels that delivers blood to the body.   In the [place], 9,000 deaths are caused by AAA.  You may not even know you have this problem and as many as 1 in 3 people will have a serious problem if it is not treated.  Early diagnosis allows for more effective  Patient called and said pharmacy said they never rec'd the August refill of Veozah with 11 refills.  Patient is asking for RX to be re-sent to   [id number] - [place]      Please advise.   treatment and cure.  If you have a family history of AAA or are a male age 65-75 who has smoked, you are at higher risk of an AAA.  Your provider can order this test, if needed.    Colorectal Screening:  · There are many tests that are used to check for cancer of your colon and rectum. You and your provider should discuss what test is best for you and when to have it done.  Options include:  · Screening Colonoscopy: exam of the entire colon, seen through a flexible lighted tube.  · Flexible Sigmoidoscopy: exam of the last third (sigmoid portion) of the colon and rectum, seen through a flexible lighted tube.  · Cologuard DNA stool test: a sample of your stool is used to screen for cancer and unseen blood in your stool.  · Fecal Occult Blood Test: a sample of your stool is studied to find any unseen blood    Flu Shot:  · An immunization that helps to prevent influenza (the flu). You should get this every year. The best time to get the shot is in the fall.    Pneumococcal Shot:  • Vaccines are available that can help prevent pneumococcal disease, which is any type of infection caused by Streptococcus pneumoniae bacteria.   Their use can prevent some cases of pneumonia, meningitis, and sepsis. There are two types of pneumococcal vaccines:   o Conjugate vaccines (PCV-13 or Prevnar 13®) - helps protect against the 13 types of pneumococcal bacteria that are the most common causes of serious infections in children and adults.    o Polysaccharide vaccine (PPSV23 or [id number]®) - helps protect against 23 types of pneumococcal bacteria for patients who are recommended to get it.  These vaccines should be given at least 12 months apart.  A booster is usually not needed.     Hepatitis B Shot:  · An immunization that helps to protect people from getting Hepatitis B. Hepatitis B is a virus that spreads through contact with infected blood or body fluids. Many people with the virus do not have symptoms.  The virus can lead to  serious problems, such as liver disease. Some people are at higher risk than others. Your doctor will tell you if you need this shot.     Diabetes Screening:  · A test to measure sugar (glucose) in your blood is called a fasting blood sugar. Fasting means you cannot have food or drink for at least 8 hours before the test. This test can detect diabetes long before you may notice symptoms.    Glaucoma Screening:  · Glaucoma screening is performed by your eye doctor. The test measures the fluid pressure inside your eyes to determine if you have glaucoma.     Hepatitis C Screening:  · A blood test to see if you have the hepatitis C virus.  Hepatitis C attacks the liver and is a major cause of chronic liver disease.  Medicare will cover a single screening for all adults born between 1945 & 1965, or high risk patients (people who have injected illegal drugs or people who have had blood transfusions).  High risk patients who continue to inject illegal drugs can be screened for Hepatitis C every year.    Smoking and Tobacco-Use Cessation Counseling:  · Tobacco is the single greatest cause of disease and early death in our country today. Medication and counseling together can increase a person’s chance of quitting for good.   · Medicare covers two quitting attempts per year, with four counseling sessions per attempt (eight sessions in a 12 month period)    Preventive Screening tests for Women    Screening Mammograms and Breast Exams:  · An x-ray of your breasts to check for breast cancer before you or your doctor may be able to feel it.  If breast cancer is found early it can usually be treated with success.    Pelvic Exams and Pap Tests:  · An exam to check for cervical and vaginal cancer. A Pap test is a lab test in which cells are taken from your cervix and sent to the lab to look for signs of cervical cancer. If cancer of the cervix is found early, chances for a cure are good. Testing can generally end at age 65, or if a  woman has a hysterectomy for a benign condition. Your provider may recommend more frequent testing if certain abnormal results are found.    Bone Mass Measurements:  · A painless x-ray of your bone density to see if you are at risk for a broken bone. Bone density refers to the thickness of bones or how tightly the bone tissue is packed.    Preventive Screening tests for Men    Prostate Screening:  · Should you have a prostate cancer test (PSA)?  It is up to you to decide if you want a prostate cancer test. Talk to your clinician to find out if the test is right for you.  Things for you to consider and talk about should include:  · Benefits and harms of the test  · Your family history  · How your race/ethnicity may influence the test  · If the test may impact other medical conditions you have  · Your values on screenings and treatments    *Medicare pays for many preventive services to keep you healthy. For some of these services, you might have to pay a deductible, coinsurance, and / or copayment.  The amounts vary depending on the type of services you need and the kind of Medicare health plan you have.    For further details on screenings offered by Medicare please visit: https://www.medicare.gov/coverage/preventive-screening-services

## 2024-09-16 RX ORDER — PAROXETINE 20 MG/1
20 TABLET, FILM COATED ORAL EVERY MORNING
Qty: 90 TABLET | Refills: 1 | Status: SHIPPED | OUTPATIENT
Start: 2024-09-16

## 2024-09-16 NOTE — TELEPHONE ENCOUNTER
Medication(s) to Refill:   Requested Prescriptions     Pending Prescriptions Disp Refills    PAROXETINE 20 MG Oral Tab [Pharmacy Med Name: Paroxetine Hydrochloride 20 Mg Tab Nort] 90 tablet 0     Sig: Take 1 tablet (20 mg total) by mouth every morning.         Reason for Medication Refill being sent to Provider / Reason Protocol Failed:  [] 90 day refill has already been granted  [] Blood Pressure out of range  [] Labs Abnormal/over due  [] Medication not previously prescribed by Provider  [] Non-Protocol Medication  [] Controlled Substance   [] Due for appointment- no future appointment scheduled  [] No Follow up specified      Last Time Medication was Filled:  5/15/24      Last Office Visit with PCP: 5/15/24    When Patient was Due Back to the Office:    (from when PCP last addressed condition)  2 weeks   Future Appointments:  No future appointments.      Last Blood Pressures:  BP Readings from Last 2 Encounters:   05/15/24 110/70   05/04/24 110/75         Recent Labs:        Action taken:  [] Refill approved per protocol  [] Routing to provider for approval

## 2024-10-14 DIAGNOSIS — B00.1 RECURRENT COLD SORES: ICD-10-CM

## 2024-10-14 RX ORDER — VALACYCLOVIR HYDROCHLORIDE 500 MG/1
500 TABLET, FILM COATED ORAL DAILY
Qty: 90 TABLET | Refills: 0 | Status: SHIPPED | OUTPATIENT
Start: 2024-10-14

## 2024-12-02 RX ORDER — TOPIRAMATE 100 MG/1
100 TABLET, FILM COATED ORAL 2 TIMES DAILY
Qty: 180 TABLET | Refills: 3 | Status: SHIPPED | OUTPATIENT
Start: 2024-12-02

## 2025-01-13 DIAGNOSIS — B00.1 RECURRENT COLD SORES: ICD-10-CM

## 2025-01-13 RX ORDER — VALACYCLOVIR HYDROCHLORIDE 500 MG/1
500 TABLET, FILM COATED ORAL DAILY
Qty: 90 TABLET | Refills: 0 | Status: SHIPPED | OUTPATIENT
Start: 2025-01-13

## 2025-03-12 NOTE — TELEPHONE ENCOUNTER
Medication(s) to Refill:   Requested Prescriptions     Pending Prescriptions Disp Refills    PAROXETINE 20 MG Oral Tab [Pharmacy Med Name: Paroxetine Hydrochloride 20 Mg Tab Nort] 90 tablet 0     Sig: TAKE 1 TABLET BY MOUTH EVERY MORNING         Reason for Medication Refill being sent to Provider / Reason Protocol Failed:  [] 90 day refill has already been granted  [] Blood Pressure out of range  [] Labs Abnormal/over due  [] Medication not previously prescribed by Provider  [] Non-Protocol Medication  [] Controlled Substance   [] Due for appointment- no future appointment scheduled  [] No Follow up specified      Last Time Medication was Filled:  9/16/24      Last Office Visit with PCP: 5/15/24    When Patient was Due Back to the Office:    (from when PCP last addressed condition)    Future Appointments:  No future appointments.      Last Blood Pressures:  BP Readings from Last 2 Encounters:   05/15/24 110/70   05/04/24 110/75     Action taken:  [] Refill approved per protocol  [] Routing to provider for approval

## 2025-03-13 RX ORDER — PAROXETINE 20 MG/1
20 TABLET, FILM COATED ORAL EVERY MORNING
Qty: 90 TABLET | Refills: 0 | Status: SHIPPED | OUTPATIENT
Start: 2025-03-13

## 2025-04-09 ENCOUNTER — TELEPHONE (OUTPATIENT)
Dept: FAMILY MEDICINE CLINIC | Facility: CLINIC | Age: 55
End: 2025-04-09

## 2025-04-09 DIAGNOSIS — Z00.00 ROUTINE GENERAL MEDICAL EXAMINATION AT A HEALTH CARE FACILITY: Primary | ICD-10-CM

## 2025-04-14 ENCOUNTER — OFFICE VISIT (OUTPATIENT)
Dept: FAMILY MEDICINE CLINIC | Facility: CLINIC | Age: 55
End: 2025-04-14
Payer: COMMERCIAL

## 2025-04-14 ENCOUNTER — LAB ENCOUNTER (OUTPATIENT)
Dept: LAB | Age: 55
End: 2025-04-14
Attending: FAMILY MEDICINE
Payer: COMMERCIAL

## 2025-04-14 VITALS
BODY MASS INDEX: 24.11 KG/M2 | WEIGHT: 150 LBS | SYSTOLIC BLOOD PRESSURE: 102 MMHG | DIASTOLIC BLOOD PRESSURE: 70 MMHG | HEART RATE: 78 BPM | OXYGEN SATURATION: 99 % | HEIGHT: 66 IN

## 2025-04-14 DIAGNOSIS — G44.221 CHRONIC TENSION-TYPE HEADACHE, INTRACTABLE: ICD-10-CM

## 2025-04-14 DIAGNOSIS — G89.29 CHRONIC PAIN OF LEFT KNEE: ICD-10-CM

## 2025-04-14 DIAGNOSIS — Z00.00 ANNUAL PHYSICAL EXAM: Primary | ICD-10-CM

## 2025-04-14 DIAGNOSIS — Z00.00 ROUTINE GENERAL MEDICAL EXAMINATION AT A HEALTH CARE FACILITY: ICD-10-CM

## 2025-04-14 DIAGNOSIS — M25.562 CHRONIC PAIN OF LEFT KNEE: ICD-10-CM

## 2025-04-14 DIAGNOSIS — Z12.31 ENCOUNTER FOR SCREENING MAMMOGRAM FOR MALIGNANT NEOPLASM OF BREAST: ICD-10-CM

## 2025-04-14 LAB
ALBUMIN SERPL-MCNC: 4.5 G/DL (ref 3.2–4.8)
ALBUMIN/GLOB SERPL: 2 {RATIO} (ref 1–2)
ALP LIVER SERPL-CCNC: 50 U/L (ref 46–118)
ALT SERPL-CCNC: 10 U/L (ref 10–49)
ANION GAP SERPL CALC-SCNC: 10 MMOL/L (ref 0–18)
AST SERPL-CCNC: 17 U/L (ref ?–34)
BASOPHILS # BLD AUTO: 0.02 X10(3) UL (ref 0–0.2)
BASOPHILS NFR BLD AUTO: 0.5 %
BILIRUB SERPL-MCNC: 0.5 MG/DL (ref 0.3–1.2)
BUN BLD-MCNC: 13 MG/DL (ref 9–23)
CALCIUM BLD-MCNC: 9.6 MG/DL (ref 8.7–10.6)
CHLORIDE SERPL-SCNC: 109 MMOL/L (ref 98–112)
CHOLEST SERPL-MCNC: 194 MG/DL (ref ?–200)
CO2 SERPL-SCNC: 23 MMOL/L (ref 21–32)
CREAT BLD-MCNC: 0.79 MG/DL (ref 0.55–1.02)
EGFRCR SERPLBLD CKD-EPI 2021: 87 ML/MIN/1.73M2 (ref 60–?)
EOSINOPHIL # BLD AUTO: 0.11 X10(3) UL (ref 0–0.7)
EOSINOPHIL NFR BLD AUTO: 2.5 %
ERYTHROCYTE [DISTWIDTH] IN BLOOD BY AUTOMATED COUNT: 12.6 %
FASTING PATIENT LIPID ANSWER: YES
FASTING STATUS PATIENT QL REPORTED: YES
GLOBULIN PLAS-MCNC: 2.2 G/DL (ref 2–3.5)
GLUCOSE BLD-MCNC: 104 MG/DL (ref 70–99)
HCT VFR BLD AUTO: 41.9 % (ref 35–48)
HDLC SERPL-MCNC: 92 MG/DL (ref 40–59)
HGB BLD-MCNC: 13.5 G/DL (ref 12–16)
IMM GRANULOCYTES # BLD AUTO: 0.01 X10(3) UL (ref 0–1)
IMM GRANULOCYTES NFR BLD: 0.2 %
LDLC SERPL CALC-MCNC: 93 MG/DL (ref ?–100)
LYMPHOCYTES # BLD AUTO: 1.06 X10(3) UL (ref 1–4)
LYMPHOCYTES NFR BLD AUTO: 24.4 %
MCH RBC QN AUTO: 33.3 PG (ref 26–34)
MCHC RBC AUTO-ENTMCNC: 32.2 G/DL (ref 31–37)
MCV RBC AUTO: 103.5 FL (ref 80–100)
MONOCYTES # BLD AUTO: 0.43 X10(3) UL (ref 0.1–1)
MONOCYTES NFR BLD AUTO: 9.9 %
NEUTROPHILS # BLD AUTO: 2.72 X10 (3) UL (ref 1.5–7.7)
NEUTROPHILS # BLD AUTO: 2.72 X10(3) UL (ref 1.5–7.7)
NEUTROPHILS NFR BLD AUTO: 62.5 %
NONHDLC SERPL-MCNC: 102 MG/DL (ref ?–130)
OSMOLALITY SERPL CALC.SUM OF ELEC: 294 MOSM/KG (ref 275–295)
PLATELET # BLD AUTO: 222 10(3)UL (ref 150–450)
POTASSIUM SERPL-SCNC: 3.9 MMOL/L (ref 3.5–5.1)
PROT SERPL-MCNC: 6.7 G/DL (ref 5.7–8.2)
RBC # BLD AUTO: 4.05 X10(6)UL (ref 3.8–5.3)
SODIUM SERPL-SCNC: 142 MMOL/L (ref 136–145)
TRIGL SERPL-MCNC: 45 MG/DL (ref 30–149)
TSI SER-ACNC: 1.13 UIU/ML (ref 0.55–4.78)
VLDLC SERPL CALC-MCNC: 7 MG/DL (ref 0–30)
WBC # BLD AUTO: 4.4 X10(3) UL (ref 4–11)

## 2025-04-14 PROCEDURE — 3078F DIAST BP <80 MM HG: CPT | Performed by: FAMILY MEDICINE

## 2025-04-14 PROCEDURE — 99396 PREV VISIT EST AGE 40-64: CPT | Performed by: FAMILY MEDICINE

## 2025-04-14 PROCEDURE — 3008F BODY MASS INDEX DOCD: CPT | Performed by: FAMILY MEDICINE

## 2025-04-14 PROCEDURE — 3074F SYST BP LT 130 MM HG: CPT | Performed by: FAMILY MEDICINE

## 2025-04-14 PROCEDURE — 80050 GENERAL HEALTH PANEL: CPT | Performed by: FAMILY MEDICINE

## 2025-04-14 PROCEDURE — 80061 LIPID PANEL: CPT | Performed by: FAMILY MEDICINE

## 2025-04-14 NOTE — PATIENT INSTRUCTIONS
-INSOMNIA: Insomnia is a condition where you have trouble falling or staying asleep. We discussed that your current medication, Paxil (paroxetine), might be contributing to your sleep issues. You should switch to taking Paxil at night instead of in the morning. Skip your morning dose and start taking it at night beginning tomorrow.    -ARTHRITIS AND TENDONITIS: Arthritis is inflammation of the joints, and tendonitis is inflammation of the tendons. Your joint pain, especially in your hands and left knee, is likely due to these conditions, possibly worsened by your work as a . You should start using Voltaren gel on your hands and knee up to twice daily for pain relief. We will also get an x-ray of your left knee to check for arthritis. Depending on the results, we may refer you to an orthopedic specialist or consider physical therapy. If your thumb pain does not improve, we might consider a steroid injection.        [facility]       Tonny Blum DO (sports medicine/non-operative)     Nic Hernandez MD (hand)  Monty Choi MD (hand)    Nghia Hooker MD (knee/hip)   Lucien Garcia MD (knee/hip)     [address]   [address] (same as ER)   [place] [address]    [address]  [address] [address]    [address] [place] [id number]    [phone]

## 2025-04-14 NOTE — PROGRESS NOTES
Subjective:   Minda Stallworth is a 55 year old female who presents for Physical (Reviewed Preventative/Wellness form with patient./Physical and blood work /is having issues with her left knee . /her wrist are bothering her /Cannot sleep at night wake up almost every hour /)       History/Other:   History of Present Illness     She experiences insomnia, initially thought to be due to hot flashes. After starting Veozah, which effectively managed the hot flashes, she continues to wake up at night. She occasionally consumes alcohol but does not find it correlates with her sleep disturbances. She wakes up to urinate but can control this urge. She has tried patches recommended by her sister and avoids sleep aids like Tylenol PM due to feeling like a 'zombie.' She currently takes Paxil (paroxetine) in the morning.    She reports joint pain, particularly in her hands and knees. The pain in her hands is located in the thumbs and is exacerbated by movements such as giving a thumbs up or touching thumb to pinky. She uses patches for relief but finds them insufficient. She has not tried Biofreeze or Voltaren yet. As a , she experiences overuse of her hands, which may contribute to her symptoms.    She experiences knee pain, primarily in the left knee, which has been worsening over the past three months. She denies any recent injury or fall. The knee occasionally halley, but she does not experience numbness or tingling. Her ankles swell due to varicose veins, especially after prolonged standing. She has not noticed any unusual swelling in the knee itself.    No headaches, unusual numbness, tingling, or new moles. She bruises easily but has no unusual rashes or bruising. No unusual swelling in the knee.       Chief Complaint Reviewed and Verified  Nursing Notes Reviewed and   Verified  Tobacco Reviewed  Allergies Reviewed  Medications Reviewed    Problem List Reviewed  Medical History Reviewed  Surgical History    Reviewed  Family History Reviewed  Social History Reviewed         Tobacco:  She smoked tobacco in the past but quit greater than 12 months ago.  Tobacco Use[1]     Current Medications[2]      Review of Systems:  Review of Systems   Constitutional:  Negative for chills, fatigue and fever.   HENT:  Negative for hearing loss, rhinorrhea and sinus pressure.    Eyes:  Negative for visual disturbance.   Respiratory:  Negative for cough and shortness of breath.    Cardiovascular:  Negative for chest pain and palpitations.   Gastrointestinal:  Negative for abdominal pain, constipation, diarrhea, nausea and vomiting.   Genitourinary:  Negative for dysuria and frequency.   Musculoskeletal:  Positive for arthralgias and gait problem. Negative for back pain and myalgias.   Skin:  Negative for rash.   Neurological:  Negative for dizziness, light-headedness and headaches.   Hematological:  Does not bruise/bleed easily.   Psychiatric/Behavioral:  Negative for dysphoric mood. The patient is not nervous/anxious.          Objective:   /70   Pulse 78   Ht 5' 6\" (1.676 m)   Wt 150 lb (68 kg)   SpO2 99%   BMI 24.21 kg/m²  Estimated body mass index is 24.21 kg/m² as calculated from the following:    Height as of this encounter: 5' 6\" (1.676 m).    Weight as of this encounter: 150 lb (68 kg).  Physical Exam  Constitutional:       General: She is not in acute distress.     Appearance: She is well-developed.   HENT:      Mouth/Throat:      Pharynx: No posterior oropharyngeal erythema.   Eyes:      General: No scleral icterus.     Conjunctiva/sclera: Conjunctivae normal.      Pupils: Pupils are equal, round, and reactive to light.   Cardiovascular:      Rate and Rhythm: Normal rate and regular rhythm.      Heart sounds: No murmur heard.  Pulmonary:      Effort: Pulmonary effort is normal.      Breath sounds: Normal breath sounds.   Abdominal:      General: Bowel sounds are normal.      Palpations: Abdomen is soft. There is  no mass.      Tenderness: There is no abdominal tenderness. There is no guarding.   Musculoskeletal:      Right hand: Tenderness present. No bony tenderness. Decreased strength of finger abduction.      Left hand: Tenderness present. No bony tenderness. Normal strength.      Cervical back: Neck supple.      Right knee: Normal.      Left knee: Crepitus present. No swelling or effusion.   Lymphadenopathy:      Cervical: No cervical adenopathy.   Skin:     Capillary Refill: Capillary refill takes less than 2 seconds.      Findings: No bruising or rash.   Neurological:      General: No focal deficit present.      Mental Status: She is alert and oriented to person, place, and time.   Psychiatric:         Mood and Affect: Mood normal.         Behavior: Behavior normal.         Thought Content: Thought content normal.       Results              Assessment & Plan    General Health Maintenance  Weight loss achieved through lifestyle changes. Engages in therapy for mental health support.  Mammogram ordered     Insomnia  Chronic insomnia not related to hot flashes or alcohol. Paroxetine may contribute to sleep issues. Discussed nighttime dosing for calming effects.  - Switch paroxetine to nighttime dosing. Skip morning dose and start nighttime dosing tomorrow.    Arthritis and Tendonitis  Chronic arthritis and tendonitis in hands, especially thumbs, likely due to occupational overuse. Possible left knee arthritis with pain and buckling. Discussed topical treatments and potential steroid injections.  - Recommend using Voltaren gel on hands and knee as needed, up to twice daily.  - Order x-ray of the left knee to assess for arthritis.  - Consider referral to orthopedic specialist if arthritis is confirmed.  - Consider physical therapy if no arthritis is found in the knee.  - Consider steroid injection for thumb if symptoms do not improve.      Follow-up   Recent blood work completed, results stable.         Return in about 6  months (around 10/14/2025).      ERIC CARSON MD, 2025, 1:04 PM              [1]   Social History  Tobacco Use   Smoking Status Former    Current packs/day: 0.00    Average packs/day: 1 pack/day for 22.0 years (22.0 ttl pk-yrs)    Types: Cigarettes    Start date: 5/15/1991    Quit date: 5/15/2013    Years since quittin.9   Smokeless Tobacco Never   [2]   Current Outpatient Medications   Medication Sig Dispense Refill    PAROXETINE 20 MG Oral Tab TAKE 1 TABLET BY MOUTH EVERY MORNING 90 tablet 0    VALACYCLOVIR 500 MG Oral Tab TAKE ONE TABLET BY MOUTH DAILY 90 tablet 0    topiramate 100 MG Oral Tab TAKE ONE TABLET BY MOUTH TWICE DAILY 180 tablet 3    Fezolinetant (VEOZAH) 45 MG Oral Tab Take 1 tablet by mouth daily. 30 tablet 11    ondansetron (ZOFRAN) 4 mg tablet Take 1 tablet (4 mg total) by mouth every 8 (eight) hours as needed for Nausea. 20 tablet 2    SUMAtriptan Succinate 100 MG Oral Tab Take 1 tablet (100 mg total) by mouth every 2 (two) hours as needed for Migraine. 9 tablet 5    triamcinolone 0.1 % External Cream Apply topically 2 (two) times daily as needed. 60 g 1    HYDROcodone-acetaminophen 5-325 MG Oral Tab Take 1 tablet by mouth every 6 (six) hours as needed for Pain. (Patient not taking: Reported on 2025) 15 tablet 0

## 2025-04-14 NOTE — PROGRESS NOTES
The following individual(s) verbally consented to be recorded using ambient AI listening technology and understand that they can each withdraw their consent to this listening technology at any point by asking the clinician to turn off or pause the recording:    Patient name: Minda Stallworth  Additional names:

## 2025-04-23 DIAGNOSIS — B00.1 RECURRENT COLD SORES: ICD-10-CM

## 2025-04-25 NOTE — TELEPHONE ENCOUNTER
Last appointment 4/14/25.  Pharmacy called requesting verbal authorization for refill of Valacyclovir for cold sores.

## 2025-04-28 RX ORDER — VALACYCLOVIR HYDROCHLORIDE 500 MG/1
500 TABLET, FILM COATED ORAL DAILY
Qty: 90 TABLET | Refills: 0 | Status: SHIPPED | OUTPATIENT
Start: 2025-04-28

## 2025-04-28 NOTE — TELEPHONE ENCOUNTER
Refill Per Protocol     Requested Prescriptions   Pending Prescriptions Disp Refills    VALACYCLOVIR 500 MG Oral Tab [Pharmacy Med Name: Valacyclovir Hydrochloride 500 Mg Tab Nort] 90 tablet 0     Sig: TAKE ONE TABLET BY MOUTH DAILY       Herpes Agent Protocol Passed - 4/28/2025  9:04 AM        Passed - In person appointment or virtual visit in the past 12 mos or appointment in next 3 mos     Recent Outpatient Visits              2 weeks ago Annual physical exam    [facility], [place] [address], [place] Nina Hernández MD    Office Visit    10 months ago [facility]-operative [facility], [address] [place] [address], [place] Jose Pro DPM    Office Visit    10 months ago     [facility] Nigel Ibanez PT    Office Visit    10 months ago     [facility] Nigel Ibanez PT    Office Visit    10 months ago     [facility] Nigel Ibanez PT    Office Visit                      Passed - Medication is active on med list

## 2025-05-21 DIAGNOSIS — G43.709 CHRONIC MIGRAINE WITHOUT AURA WITHOUT STATUS MIGRAINOSUS, NOT INTRACTABLE: ICD-10-CM

## 2025-05-22 RX ORDER — SUMATRIPTAN SUCCINATE 100 MG/1
100 TABLET ORAL EVERY 2 HOUR PRN
Qty: 9 TABLET | Refills: 3 | Status: SHIPPED | OUTPATIENT
Start: 2025-05-22

## 2025-06-25 RX ORDER — PAROXETINE 20 MG/1
20 TABLET, FILM COATED ORAL EVERY MORNING
Qty: 90 TABLET | Refills: 0 | Status: SHIPPED | OUTPATIENT
Start: 2025-06-25

## 2025-07-21 DIAGNOSIS — B00.1 RECURRENT COLD SORES: ICD-10-CM

## 2025-07-21 RX ORDER — VALACYCLOVIR HYDROCHLORIDE 500 MG/1
500 TABLET, FILM COATED ORAL DAILY
Qty: 90 TABLET | Refills: 3 | Status: SHIPPED | OUTPATIENT
Start: 2025-07-21

## 2025-07-22 NOTE — TELEPHONE ENCOUNTER
Refill passed per [facility] protocols.    Requested Prescriptions   Pending Prescriptions Disp Refills    VALACYCLOVIR 500 MG Oral Tab [Pharmacy Med Name: Valacyclovir Hydrochloride 500 Mg Tab Nort] 90 tablet 3     Sig: TAKE ONE TABLET BY MOUTH ONE TIME DAILY       Herpes Agent Protocol Passed - 7/21/2025  7:00 PM        Passed - In person appointment or virtual visit in the past 12 mos or appointment in next 3 mos        Passed - Medication is active on med list

## (undated) DIAGNOSIS — G43.709 CHRONIC MIGRAINE WITHOUT AURA WITHOUT STATUS MIGRAINOSUS, NOT INTRACTABLE: ICD-10-CM

## (undated) DEVICE — DISPOSABLE TOURNIQUET CUFF SINGLE BLADDER, DUAL PORT AND QUICK CONNECT CONNECTOR: Brand: COLOR CUFF

## (undated) DEVICE — BLADE SAW W9XL25MM THK0.51MM CUT THK0.56MM

## (undated) DEVICE — DISPOSABLE GRASPER: Brand: EPIX LAPAROSCOPIC GRASPER

## (undated) DEVICE — SOLUTION IRRIG 1000ML 0.9% NACL USP BTL

## (undated) DEVICE — TRADITIONAL MARYLAND DISSECTOR TIP, DISPOSABLE: Brand: RENEW

## (undated) DEVICE — STERILE POLYISOPRENE POWDER-FREE SURGICAL GLOVES: Brand: PROTEXIS

## (undated) DEVICE — SUTURE FIBERWIRE 2-0 L18IN NONABSORBABLE BLU

## (undated) DEVICE — SLEEVE COMPR MD KNEE LEN SGL USE KENDALL SCD

## (undated) DEVICE — SUT ETHLN 3-0 18IN PS-1 NABSRB BLK 24MM 3/8 C

## (undated) DEVICE — ENDOPATH ULTRA VERESS INSUFFLATION NEEDLES WITH LUER LOCK CONNECTORS: Brand: ENDOPATH

## (undated) DEVICE — 40580 - THE PINK PAD - ADVANCED TRENDELENBURG POSITIONING KIT: Brand: 40580 - THE PINK PAD - ADVANCED TRENDELENBURG POSITIONING KIT

## (undated) DEVICE — GLOVE SUR 7.5 SENSICARE PI PIP CRM PWD F

## (undated) DEVICE — BANDAGE COHESIVE W4INXL5YD TAN E POR SLF ADH

## (undated) DEVICE — SUT MCRYL 4-0 18IN PS-2 ABSRB UD 19MM 3/8 CIR

## (undated) DEVICE — POUCH SPECIMEN WIRE 6X3 250ML

## (undated) DEVICE — LAP CHOLE/APPY CDS-LF: Brand: MEDLINE INDUSTRIES, INC.

## (undated) DEVICE — TROCAR: Brand: KII® SLEEVE

## (undated) DEVICE — DRESSING WET L16XW3IN OIL EMUL N ADH CURAD

## (undated) DEVICE — GOWN,SIRUS,FABRIC-REINFORCED,LARGE: Brand: MEDLINE

## (undated) DEVICE — SLEEVE KENDALL SCD EXPRESS MED

## (undated) DEVICE — LIGHT HANDLE

## (undated) DEVICE — SUT MONOCRYL 4-0 PS-2 Y496G

## (undated) DEVICE — LOWER EXTREMITY CDS-LF: Brand: MEDLINE INDUSTRIES, INC.

## (undated) DEVICE — TROCAR: Brand: KII FIOS FIRST ENTRY

## (undated) DEVICE — SOL NACL IRRIG 0.9% 1000ML BTL

## (undated) DEVICE — ECHELON 3000 45 STANDARD: Brand: ECHELON

## (undated) DEVICE — THE ECHELON, ECHELON ENDOPATH™ AND ECHELON FLEX™ FAMILIES OF ENDOSCOPIC LINEAR CUTTERS AND RELOADS ARE STERILE, SINGLE PATIENT USE INSTRUMENTS THAT SIMULTANEOUSLY CUT AND STAPLE TISSUE. THERE ARE SIX STAGGERED ROWS OF STAPLES, THREE ON EITHER SIDE OF THE CUT LINE. THE 45 MM INSTRUMENTS HAVE A STAPLE LINE THATIS APPROXIMATELY 45 MM LONG AND A CUT LINE THAT IS APPROXIMATELY 42 MM LONG. THE SHAFT CAN ROTATE FREELY IN BOTH DIRECTIONS AND AN ARTICULATION MECHANISM ON ARTICULATING INSTRUMENTS ENABLES BENDING THE DISTAL PORTIONOF THE SHAFT TO FACILITATE LATERAL ACCESS OF THE OPERATIVE SITE.THE INSTRUMENTS ARE SHIPPED WITHOUT A RELOAD AND MUST BE LOADED PRIOR TO USE. A STAPLE RETAINING CAP ON THE RELOAD PROTECTS THE STAPLE LEG POINTS DURING SHIPPING AND TRANSPORTATION. THE INSTRUMENTS’ LOCK-OUT FEATURE IS DESIGNED TO PREVENT A USED RELOAD FROM BEING REFIRED.: Brand: ECHELON ENDOPATH

## (undated) DEVICE — HANDPIECE TIP: Brand: SONICFUSION

## (undated) DEVICE — OCCLUSIVE GAUZE STRIP OVERWRAP,3% BISMUTH TRIBROMOPHENATE IN PETROLATUM BLEND: Brand: XEROFORM

## (undated) DEVICE — HUNTER GASPER TIP, DISPOSABLE: Brand: RENEW

## (undated) DEVICE — SUT ETHBND XL 0 30IN CT-1 NABSRB GRN 36MM 1/2

## (undated) DEVICE — SUT PDS II 0 CT-1 Z340H

## (undated) DEVICE — #11 STERILE BLADE: Brand: POLYMER COATED BLADES

## (undated) DEVICE — DERMABOND CLOSURE 0.7ML TOPICL

## (undated) DEVICE — SUT VCRL 2-0 27IN FSL ABSRB UD L30MM 3/8 CIR

## (undated) DEVICE — SUT VICRYL 0 J608H

## (undated) DEVICE — ENSEAL X1 TISSUE SEALER, CURVED JAW, 37 CM SHAFT LENGTH: Brand: ENSEAL

## (undated) DEVICE — TRAY SURESTEP 16 BARDEX UMETR

## (undated) DEVICE — DRILL: Brand: SONICFUSION

## (undated) NOTE — LETTER
Date: 1/6/2020    Patient Name: Bird Mosquera          To Whom it may concern: The above patient was seen at the [facility] for treatment of a medical condition. This patient should be excused from attending work 1/6/2020.       Obdulio Varela

## (undated) NOTE — LETTER
18    Patient: Natalie Kunz  : 3/13/1970 Visit date: 2018    Dear  Dr. Reba Jimenez MD,    Thank you for referring Natalie Kunz to my practice. Please find my assessment and plan below.                 Assessment   Encounter for screening colo

## (undated) NOTE — LETTER
AUTHORIZATION FOR SURGICAL OPERATION OR OTHER PROCEDURE    1. I hereby authorize Dr. Morteza Garcia, and [facility] staff assigned to my case to perform the following operation and/or procedure at the [facility]:    _______________________________________________________________________________________________    Right foot plantar fasciitis cortisone injection  _______________________________________________________________________________________________    2.  My physician has explained the nature and purpose of the operation or other procedure, possible alternative methods of treatment, the risks involved, and the possibility of complication to me.  I acknowledge that no guarantee has been made as to the result that may be obtained.  3.  I recognize that, during the course of this operation, or other procedure, unforseen conditions may necessitate additional or different procedure than those listed above.  I, therefore, further authorize and request that the above named physician, his/her physician assistants or designees perform such procedures as are, in his/her professional opinion, necessary and desirable.  4.  Any tissue or organs removed in the operation or other procedure may be disposed of by and at the discretion of the [facility] and [facility].  5.  I understand that in the event of a medical emergency, I will be transported by local paramedics to [facility] or other hospital emergency department.  6.  I certify that I have read and fully understand the above consent to operation and/or other procedure.    7.  I acknowledge that my physician has explained sedation/analgesia administration to me including the risks and benefits.  I consent to the administration of sedation/analgesia as may be necessary or desirable in the judgement of my physician.    Witness signature: ___________________________________________________ Date:   ______/______/_____                    Time:  ________ A.M.  P.M.       Patient Name:  ______________________________________________________  (please print)      Patient signature:  ___________________________________________________             Relationship to Patient:           []  Parent    Responsible person                          []  Spouse  In case of minor or                    [] Other  _____________   Incompetent name:  __________________________________________________                               (please print)      _____________      Responsible person  In case of minor or  Incompetent signature:  _______________________________________________    Statement of Physician  My signature below affirms that prior to the time of the procedure, I have explained to the patient and/or his/her guardian, the risks and benefits involved in the proposed treatment and any reasonable alternative to the proposed treatment.  I have also explained the risks and benefits involved in the refusal of the proposed treatment and have answered the patient's questions.                        Date:  ______/______/_______  Provider                      Signature:  __________________________________________________________       Time:  ___________ A.M    P.M.

## (undated) NOTE — LETTER
11/1/2023          To Whom It May Concern:    Yoon Arenas is currently under my medical care and may not return to work at this time. Please excuse Luis Cue for the next 2 weeks, at which time we will see her back in the office to re evaluate. Activity is restricted as follows: No work. If you require additional information please contact our office.         Sincerely,    Cliff Arce DPM

## (undated) NOTE — LETTER
11/15/2023          To Whom It May Concern:    Suly Gama is currently under my medical care and may not return to work at this time. Please excuse Diana Lestermakeda for the next 4 weeks, at which time we will see her back in the office to re evaluate. Activity is restricted as follows No work. If you require additional information please contact our office.         Sincerely,    Modesta White DPM

## (undated) NOTE — LETTER
4/23/2024          To Whom It May Concern:    Minda Stallworth is currently under my medical care and may not return to work at this time.    Please excuse Minda for 2 months due to limited activity and the need to use post operative boot.    If you require additional information please contact our office.        Sincerely,    Isreal Pro DPM

## (undated) NOTE — LETTER
Date: 6/15/2022    Patient Name: Jj Gautam          To Whom it may concern: The above patient was seen at the [facility] for treatment of a medical condition. This patient should be excused from attending work/school. The patient may return to work/school on 6/23/2022.         Sincerely,    KAM Macias

## (undated) NOTE — LETTER
12/13/2023          To Whom It May Concern:    Jj Gautam is currently under my medical care and may not return to work at this time, due to a foot condition and needing further testing. Activity is restricted as follows: No work. Please excuse Lynne Rodgers until 1/28/24, we will be seeing her back in the office to re evaluate. If you require additional information please contact our office.         Sincerely,    Robin Ludwig DPM

## (undated) NOTE — LETTER
Date: 11/15/2018    Patient Name: Wendi Dominguez          To Whom it may concern: The above patient was seen at the [facility] for treatment of a medical condition. This patient should be excused from attending school 11/15/18.     The

## (undated) NOTE — LETTER
1/23/2024          To Whom It May Concern:    Minda Stallworth is currently under my medical care. Please excuse her from work through 2/2/24. She will be reassessed at that time and given further work restrictions after that visit.    If you require additional information please contact our office.        Sincerely,    Isreal Pro DPM

## (undated) NOTE — LETTER
5/28/2024          To Whom It May Concern:    Minda Stallworth is currently under my medical care. She may return to work on 6/2/24. Please allow seated breaks as needed and please allow use of lace of ankle brace.    If you require additional information please contact our office.        Sincerely,    Isreal Pro DPM

## (undated) NOTE — LETTER
02/10/19        [address] [address]      Dear Dasia Travis,    [facility] records indicate that you have outstanding lab work and or testing that was ordered for you and has not yet been completed:        CBC W Differential W

## (undated) NOTE — LETTER
10/20/2023          To Whom It May Concern:    Ivana Enamorado is currently under my medical care and may return to work at this time. Please excuse Mary Hampton until next office, at which time we will see her in the office to re evaluate. Activity is restricted as follows:  Must wear cam boot at all times and allow her to elevate when needed. .    If you require additional information please contact our office.         Sincerely,    Sigifredo Allen DPM

## (undated) NOTE — LETTER
Date: 9/13/2018    Patient Name: Zeenat Malone          To Whom it may concern: This letter has been written at the patient's request. The above patient was seen at the [facility] for treatment of a medical condition.     This patient tommie

## (undated) NOTE — LETTER
3/26/2024          To Whom It May Concern:    Minda Stallworth is currently under my medical care and may not return to work for a month due to pt is in a cast to her Left foot and to remain non-weight bearing for a month.    If you require additional information please contact our office.        Sincerely,    Isreal Pro DPM          Document generated by:  Ros DIAZ MA

## (undated) NOTE — LETTER
Date: 2020    Patient Name: Michael Marks  : 1970    To Whom it may concern: The above patient was seen at the [facility] for treatment of a medical condition.     This patient should be excused from attending work from

## (undated) NOTE — LETTER
Date: 12/30/2021    Patient Name: Mayco Ventura          To Whom it may concern: This letter has been written at the patient's request. The above patient was seen at the [facility] for treatment of a medical condition.     This patient sh

## (undated) NOTE — LETTER
FAX REGARDING HISTORY AND PHYSICALS  Patient Name: Minda Stallworth CSN: [id number]   MRN: [id number]  : 3/13/1970 - A: 54 y    Surgeon(s):  Jose Pro DPM  Consent Procedure: [place] procedure of left lower extremity; repair of peroneus brevis tendon tear, left lower extremity; Plantar fasciotomy, left lower extremity  Anesthesia Type: General    The patient listed above is coming in for a surgical/procedural case.  An H&P is needed within 30 days with an update note within 7 days of the surgery/procedure. It is the ordering physician’s responsibility to do the H&P or make arrangements with the PCP to have the H&P completed and faxed to Pre-Admission Testing at [phone].    Surgery Date: 3/14/2024    Acceptable History & Physicals are:     (a) Done within 30 days of the surgery/procedure, with an update note within 7 days of the surgery/procedure.                                                                                                               OR    (b) Done within 7 days of the surgery/procedure.      Thank you.

## (undated) NOTE — LETTER
6/6/2024          To Whom It May Concern:    Minda Stallworth is currently under my medical care and is recovering from surgery. She may return to work at this time with the following restriction:  She must be able to wear her ankle brace until her next follow up appointment on June 28, 2024 at which time an update will be provided.    If you require additional information please contact our office.        Sincerely,    Isreal Pro DPM

## (undated) NOTE — LETTER
Date: 11/11/2017    Patient Name: Chad Aguilar          To Whom it may concern: This letter has been written at the patient's request. The above patient was seen at the [facility] for treatment of a medical condition.     This patient sh

## (undated) NOTE — LETTER
Date: 1/7/2022    Patient Name: Ivana Enamorado          To Whom it may concern: This letter has been written at the patient's request. The above patient was seen at the [facility] for treatment of a viral respiratory infection.  She should

## (undated) NOTE — LETTER
12/17/18        [address] [facility]  [address] [address]      Dear Tina Stevens,    [facility] records indicate that you have outstanding lab work and or testing that was ordered for you and has not yet been completed:  Orders Placed This Encounte

## (undated) NOTE — LETTER
Patient Name: Hung Singh  : 3/13/1970  MRN: [id number]  Patient Address: [address] [place] [id number]      Coronavirus Disease 2019 (COVID-19)     [address] [address] is committed to the safety and well-being of our patients, kemar carefully. If your symptoms get worse, call your healthcare provider immediately. 3. Get rest and stay hydrated.    4. If you have a medical appointment, call the healthcare provider ahead of time and tell them that you have or may have COVID-19.  5. For m of fever-reducing medications; and  · Improvement in respiratory symptoms (e.g., cough, shortness of breath); and  · At least 10 days have passed since symptoms first appeared OR if asymptomatic patient or date of symptom onset is unclear then use 10 days donors must:    · Have had a confirmed diagnosis of COVID-19  · Be symptom-free for at least 14 days*    *Some people will be required to have a repeat COVID-19 test in order to be eligible to donate.  If you’re instructed by [employer] that a repeat test is r random. Researchers are trying to identify similarities between people with a Post-COVID condition to better understand if there are risk factors. How do I prevent a Post-COVID condition?   The best way to prevent the long-term symptoms of COVID-19 is

## (undated) NOTE — LETTER
2/2/2024          To Whom It May Concern:    Minda Stallworth is currently under my medical care and may not return to work at this time.    Please excuse Minda for 2 months from 02/04/24 to 04/04/24      If you require additional information please contact our office.        Sincerely,    Isreal Pro DPM

## (undated) NOTE — LETTER
12/13/2023          To Whom It May Concern:    Christopher Spann is currently under my medical care and may not return to work at this time. Please excuse Shi Pitts for the next 4 weeks, at which time we will see her back in the office to re evaluate after further testing needed. Activity is restricted as follows: No work. If you require additional information please contact our office.         Sincerely,    Lisa Melendez DPM

## (undated) NOTE — LETTER
Date: 11/2/2018    Patient Name: Sam Childress          To Whom it may concern: The above patient was seen at the [facility] for treatment of a medical condition. This patient should be excused from attending work 11/01/18 & 11/02/18.

## (undated) NOTE — LETTER
1/30/2024          To Whom It May Concern:    Minda Stallworth is currently under my medical care and may not return to work until further notice.     If you require additional information please contact our office.        Sincerely,    Isreal Pro DPM

## (undated) NOTE — LETTER
1/23/2024          To Whom It May Concern:    Minda Stallworth is currently under my medical care. Please excuse her from work through 2/4/24. She will be assessed at that time and further restrictions will be given at that appointment.    If you require additional information please contact our office.        Sincerely,    Isreal Pro DPM

## (undated) NOTE — LETTER
AUTHORIZATION FOR SURGICAL OPERATION OR OTHER PROCEDURE    1. I hereby authorize Dr. John Nieves, and [address] [place] [address] staff assigned to my case to perform the following operation and/or procedure at the [address] [place] [address]:    _______________________________________________________________________________________________    Left tarsal tunnel cortisone injeciton  _______________________________________________________________________________________________    2. My physician has explained the nature and purpose of the operation or other procedure, possible alternative methods of treatment, the risks involved, and the possibility of complication to me. I acknowledge that no guarantee has been made as to the result that may be obtained. 3.  I recognize that, during the course of this operation, or other procedure, unforseen conditions may necessitate additional or different procedure than those listed above. I, therefore, further authorize and request that the above named physician, his/her physician assistants or designees perform such procedures as are, in his/her professional opinion, necessary and desirable. 4.  Any tissue or organs removed in the operation or other procedure may be disposed of by and at the discretion of the [address] [place] [address] and [facility] AT [facility][note 2]. 5.  I understand that in the event of a medical emergency, I will be transported by local paramedics to [facility] or other hospital emergency department. 6.  I certify that I have read and fully understand the above consent to operation and/or other procedure. 7.  I acknowledge that my physician has explained sedation/analgesia administration to me including the risks and benefits. I consent to the administration of sedation/analgesia as may be necessary or desirable in the judgement of my physician.     Witness signature: ___________________________________________________ Date:  ______/______/_____ Time:  ________ A. M.  P.M. Patient Name:  ______________________________________________________  (please print)      Patient signature:  ___________________________________________________             Relationship to Patient:           []  Parent    Responsible person                          []  Spouse  In case of minor or                    [] Other  _____________   Incompetent name:  __________________________________________________                               (please print)      _____________      Responsible person  In case of minor or  Incompetent signature:  _______________________________________________    Statement of Physician  My signature below affirms that prior to the time of the procedure, I have explained to the patient and/or his/her guardian, the risks and benefits involved in the proposed treatment and any reasonable alternative to the proposed treatment. I have also explained the risks and benefits involved in the refusal of the proposed treatment and have answered the patient's questions.                         Date:  ______/______/_______  Provider                      Signature:  __________________________________________________________       Time:  ___________ A.M    P.M.

## (undated) NOTE — LETTER
AUTHORIZATION FOR SURGICAL OPERATION OR OTHER PROCEDURE    1. I hereby [facility] Dr. Kathleen Loyd, and [facility] staff assigned to my case to perform the following operation and/or procedure at the [facility], [facility]:    _______________________________________________________________________________________________    Cortisone injection, left foot  _______________________________________________________________________________________________    2. My physician has explained the nature and purpose of the operation or other procedure, possible alternative methods of treatment, the risks involved, and the possibility of complication to me. I acknowledge that no guarantee has been made as to the result that may be obtained. 3.  I recognize that, during the course of this operation, or other procedure, unforseen conditions may necessitate additional or different procedure than those listed above. I, therefore, further authorize and request that the above named physician, his/her physician assistants or designees perform such procedures as are, in his/her professional opinion, necessary and desirable. 4.  Any tissue or organs removed in the operation or other procedure may be disposed of by and at the discretion of the [facility], [facility] and [facility] AT [facility]. 5.  I understand that in the event of a medical emergency, I will be transported by local paramedics to [facility] or other hospital emergency department. 6.  I certify that I have read and fully understand the above consent to operation and/or other procedure. 7.  I acknowledge that my physician has explained sedation/analgesia administration to me including the risks and benefits. I consent to the administration of sedation/analgesia as may be necessary or desirable in the judgement of my physician.     Witness signature: ___________________________________________________ Date:  ______/______/_____                    Time: ________ A. M.  P.M. Patient Name:  ______________________________________________________  (please print)      Patient signature:  ___________________________________________________             Relationship to Patient:           []  Parent    Responsible person                          []  Spouse  In case of minor or                    [] Other  _____________   Incompetent name:  __________________________________________________                               (please print)      _____________      Responsible person  In case of minor or  Incompetent signature:  _______________________________________________    Statement of Physician  My signature below affirms that prior to the time of the procedure, I have explained to the patient and/or his/her guardian, the risks and benefits involved in the proposed treatment and any reasonable alternative to the proposed treatment. I have also explained the risks and benefits involved in the refusal of the proposed treatment and have answered the patient's questions.                         Date:  ______/______/_______  Provider                      Signature:  __________________________________________________________       Time:  ___________ A.M    P.M.

## (undated) NOTE — LETTER
12/03/18        [address] [address]      Dear Chel Corrales,    [facility] records indicate that you have outstanding lab work and or testing that was ordered for you and has not yet been completed:  Orders Placed This Encounte

## (undated) NOTE — LETTER
Date: 1/2/2020    Patient Name: Mino Lipscomb      To Whom it may concern: The above patient was seen at the [facility] for treatment of a medical condition.     This patient should be excused from attending work on 1/2/2019 through 1/5/2

## (undated) NOTE — LETTER
02/10/19        [address] [address]      Dear Teresa Valenzuela,    [facility] records indicate that you have outstanding lab work and or testing that was ordered for you and has not yet been completed:        CBC W Differential W

## (undated) NOTE — LETTER
09/13/18        [address] [facility]  [address] [facility]      Dear Barb Lisa,    [facility] records indicate that you have outstanding lab work and or testing that was ordered for you and has not yet been completed:  Orders Placed This Encounter

## (undated) NOTE — LETTER
23    Patient: Jonathan Jackson  : 3/13/1970 Visit date: 3/9/2023    Dear  Silva Early MD    Thank you for referring Jonathan Jackson to my practice. Please find my assessment and plan below. History of Present Illness     24-year-old female who is here for evaluation symptomatic varicose veins  Amos Roberts is here today with her     Henrietta Araujo reports that she has a longstanding history of symptomatic varicose veins. She did have a right GSV stripping performed in  at [facility].    Over the past several years she has had recurrence of her protuberant varicose veins particularly in the right lower extremity below the knee. The patient describes heaviness, aching, fatigue as well as bilateral ankle swelling. Her symptoms are progressive and worse at the end of the day. Her symptoms are worse in the right lower extremity    On examination multiple protuberant varicosities are noted below the knee bilaterally    Treatment options were reviewed in detail with Amos Roberts and her     Family history-patient's father had extensive history of varicose veins, history of DVT. Patient's sister had a history of superficial thrombophlebitis    Assessment       The patient states that the symptoms have been progressive and have not responded to conservative measures including exercise, weight management and leg elevation. The patient denies any history of superficial thrombophlebitis or DVT. Plan     The importance of weight management, exercise, avoidance of prolonged standing, leg elevation was reviewed. Medical grade impression stockings for symptomatic relief  Diagnostic venous Doppler ultrasound of bilateral extremity  The patient will follow up with me after ultrasound is completed to discuss potential treatment options.               Sincerely,       Tyler Post MD   CC: No Recipients

## (undated) NOTE — LETTER
23    Patient: Hung Singh  : 3/13/1970 Visit date: 2023    Dear  Moira Harrell MD    Thank you for referring Hung Singh to my practice. Please find my assessment and plan below. History of Present Illness    Today, I am seeing this patient for follow up- Lianet Fry is here today for DR JESUS [facility]ILL[facility] procedure of the right greater saphenous vein. The procedure as well as the risks and benefits of the procedure were reviewed in detail. Lianet Fry has no further questions at this time. The Venaseal ablation of the right greater saphenous vein was performed. The patient tolerated the procedure well. On venous insufficiency ultrasound, the right SFJ was noted to be 24 mm in diameter. As this was quite large, the patient was placed on prophylactic Eliquis 2.5 mg twice a day for 1 week until postprocedural ultrasound can be completed to exclude DVT    This was discussed in detail with Lianet Fry. She has no further questions at this time.       Assessment   Saphenofemoral venous reflux  (primary encounter diagnosis)  Peripheral venous insufficiency  Varicose veins of right lower extremity with pain  Varicose veins of leg with edema, right    Plan     Follow-up postprocedural ultrasound in 1 week  Follow-up for repeat examination in 2 weeks  Patient will begin prophylactic dose of Eliquis 2.5 mg twice daily for 1 week until postprocedural ultrasound can be completed to exclude DVT as proximal GSV near saphenofemoral junction is quite large at 2.4 cm    Thank you for allowing me to participate in your patient's care         Sincerely,       Jorge Miller MD   CC:   No Recipients